# Patient Record
Sex: MALE | Race: WHITE | NOT HISPANIC OR LATINO | Employment: OTHER | URBAN - NONMETROPOLITAN AREA
[De-identification: names, ages, dates, MRNs, and addresses within clinical notes are randomized per-mention and may not be internally consistent; named-entity substitution may affect disease eponyms.]

---

## 2018-01-29 ENCOUNTER — TRANSCRIBE ORDERS (OUTPATIENT)
Dept: ADMINISTRATIVE | Facility: HOSPITAL | Age: 73
End: 2018-01-29

## 2018-01-29 DIAGNOSIS — M54.5 LOW BACK PAIN, UNSPECIFIED BACK PAIN LATERALITY, UNSPECIFIED CHRONICITY, WITH SCIATICA PRESENCE UNSPECIFIED: Primary | ICD-10-CM

## 2018-02-19 ENCOUNTER — HOSPITAL ENCOUNTER (OUTPATIENT)
Dept: MRI IMAGING | Facility: HOSPITAL | Age: 73
Discharge: HOME | End: 2018-02-19
Admitting: NURSE PRACTITIONER

## 2018-02-19 DIAGNOSIS — M54.5 LOW BACK PAIN, UNSPECIFIED BACK PAIN LATERALITY, UNSPECIFIED CHRONICITY, WITH SCIATICA PRESENCE UNSPECIFIED: ICD-10-CM

## 2018-02-19 PROCEDURE — 72148 MRI LUMBAR SPINE W/O DYE: CPT

## 2018-12-24 PROCEDURE — 99283 EMERGENCY DEPT VISIT LOW MDM: CPT

## 2018-12-24 RX ORDER — ESCITALOPRAM OXALATE 10 MG/1
10 TABLET ORAL DAILY
COMMUNITY

## 2018-12-24 RX ORDER — MISOPROSTOL 100 UG/1
100 TABLET ORAL 2 TIMES DAILY
Status: ON HOLD | COMMUNITY
End: 2019-08-08

## 2018-12-24 RX ORDER — HYDROCODONE BITARTRATE AND ACETAMINOPHEN 5; 325 MG/1; MG/1
1 TABLET ORAL DAILY PRN
COMMUNITY
End: 2018-12-25

## 2018-12-24 RX ORDER — GABAPENTIN 300 MG/1
300 CAPSULE ORAL 3 TIMES DAILY
Status: ON HOLD | COMMUNITY
End: 2019-08-08

## 2018-12-24 RX ORDER — CYCLOBENZAPRINE HCL 10 MG
10 TABLET ORAL 3 TIMES DAILY PRN
Status: ON HOLD | COMMUNITY
End: 2019-08-08

## 2018-12-24 RX ORDER — ATENOLOL 25 MG/1
13 TABLET ORAL DAILY
COMMUNITY
End: 2019-08-19 | Stop reason: HOSPADM

## 2018-12-24 RX ORDER — LOSARTAN POTASSIUM 50 MG/1
100 TABLET ORAL DAILY
Status: ON HOLD | COMMUNITY
End: 2019-08-17

## 2018-12-25 ENCOUNTER — HOSPITAL ENCOUNTER (EMERGENCY)
Facility: HOSPITAL | Age: 73
Discharge: HOME | End: 2018-12-25
Attending: EMERGENCY MEDICINE | Admitting: EMERGENCY MEDICINE

## 2018-12-25 VITALS
BODY MASS INDEX: 30.07 KG/M2 | HEIGHT: 71 IN | SYSTOLIC BLOOD PRESSURE: 139 MMHG | RESPIRATION RATE: 16 BRPM | DIASTOLIC BLOOD PRESSURE: 81 MMHG | HEART RATE: 54 BPM | OXYGEN SATURATION: 93 % | WEIGHT: 214.8 LBS | TEMPERATURE: 98.9 F

## 2018-12-25 DIAGNOSIS — M54.42 CHRONIC LEFT-SIDED LOW BACK PAIN WITH LEFT-SIDED SCIATICA: Primary | ICD-10-CM

## 2018-12-25 DIAGNOSIS — G89.29 CHRONIC LEFT-SIDED LOW BACK PAIN WITH LEFT-SIDED SCIATICA: Primary | ICD-10-CM

## 2018-12-25 PROCEDURE — 25010000002 KETOROLAC TROMETHAMINE PER 15 MG: Performed by: EMERGENCY MEDICINE

## 2018-12-25 PROCEDURE — 25010000002 HYDROMORPHONE PER 4 MG: Performed by: EMERGENCY MEDICINE

## 2018-12-25 PROCEDURE — 96372 THER/PROPH/DIAG INJ SC/IM: CPT

## 2018-12-25 RX ORDER — HYDROMORPHONE HYDROCHLORIDE 1 MG/ML
0.5 INJECTION, SOLUTION INTRAMUSCULAR; INTRAVENOUS; SUBCUTANEOUS ONCE
Status: COMPLETED | OUTPATIENT
Start: 2018-12-25 | End: 2018-12-25

## 2018-12-25 RX ORDER — KETOROLAC TROMETHAMINE 30 MG/ML
60 INJECTION, SOLUTION INTRAMUSCULAR; INTRAVENOUS ONCE
Status: COMPLETED | OUTPATIENT
Start: 2018-12-25 | End: 2018-12-25

## 2018-12-25 RX ORDER — HYDROCODONE BITARTRATE AND ACETAMINOPHEN 5; 325 MG/1; MG/1
1 TABLET ORAL EVERY 4 HOURS PRN
Qty: 15 TABLET | Refills: 0 | Status: ON HOLD | OUTPATIENT
Start: 2018-12-25 | End: 2019-08-17

## 2018-12-25 RX ORDER — PREDNISONE 50 MG/1
50 TABLET ORAL DAILY
Qty: 4 TABLET | Refills: 0 | Status: SHIPPED | OUTPATIENT
Start: 2018-12-25 | End: 2019-08-19 | Stop reason: HOSPADM

## 2018-12-25 RX ADMIN — HYDROMORPHONE HYDROCHLORIDE 0.5 MG: 1 INJECTION, SOLUTION INTRAMUSCULAR; INTRAVENOUS; SUBCUTANEOUS at 01:06

## 2018-12-25 RX ADMIN — KETOROLAC TROMETHAMINE 60 MG: 30 INJECTION, SOLUTION INTRAMUSCULAR at 01:07

## 2019-08-02 ENCOUNTER — HOSPITAL ENCOUNTER (OUTPATIENT)
Dept: CARDIOLOGY | Facility: HOSPITAL | Age: 74
Discharge: HOME | End: 2019-08-02
Admitting: INTERNAL MEDICINE

## 2019-08-02 ENCOUNTER — APPOINTMENT (OUTPATIENT)
Dept: CARDIOLOGY | Facility: HOSPITAL | Age: 74
End: 2019-08-02

## 2019-08-02 ENCOUNTER — TRANSCRIBE ORDERS (OUTPATIENT)
Dept: ADMINISTRATIVE | Facility: HOSPITAL | Age: 74
End: 2019-08-02

## 2019-08-02 ENCOUNTER — TRANSCRIBE ORDERS (OUTPATIENT)
Dept: CARDIOLOGY | Facility: CLINIC | Age: 74
End: 2019-08-02

## 2019-08-02 ENCOUNTER — HOSPITAL ENCOUNTER (OUTPATIENT)
Facility: HOSPITAL | Age: 74
Setting detail: HOSPITAL OUTPATIENT SURGERY
End: 2019-08-02
Attending: INTERNAL MEDICINE | Admitting: INTERNAL MEDICINE

## 2019-08-02 VITALS
WEIGHT: 195 LBS | DIASTOLIC BLOOD PRESSURE: 78 MMHG | BODY MASS INDEX: 27.3 KG/M2 | HEART RATE: 50 BPM | HEIGHT: 71 IN | SYSTOLIC BLOOD PRESSURE: 125 MMHG

## 2019-08-02 DIAGNOSIS — R07.9 CHEST PAIN, UNSPECIFIED TYPE: Primary | ICD-10-CM

## 2019-08-02 DIAGNOSIS — R07.9 CHEST PAIN, UNSPECIFIED TYPE: ICD-10-CM

## 2019-08-02 LAB
BH CV STRESS BP STAGE 1: NORMAL
BH CV STRESS BP STAGE 2: NORMAL
BH CV STRESS BP STAGE 3: NORMAL
BH CV STRESS DOB - ATROPINE STAGE 3: 0.6
BH CV STRESS DOSE DOBUTAMINE STAGE 1: 10
BH CV STRESS DOSE DOBUTAMINE STAGE 2: 20
BH CV STRESS DOSE DOBUTAMINE STAGE 3: 30
BH CV STRESS DURATION MIN STAGE 1: 3
BH CV STRESS DURATION MIN STAGE 2: 3
BH CV STRESS DURATION MIN STAGE 3: 3
BH CV STRESS DURATION SEC STAGE 1: 0
BH CV STRESS DURATION SEC STAGE 2: 0
BH CV STRESS DURATION SEC STAGE 3: 0
BH CV STRESS ECHO POST STRESS EJECTION FRACTION EF: 60 %
BH CV STRESS HR STAGE 1: 56
BH CV STRESS HR STAGE 2: 71
BH CV STRESS HR STAGE 3: 152
BH CV STRESS PROTOCOL 1: NORMAL
BH CV STRESS RECOVERY BP: NORMAL MMHG
BH CV STRESS RECOVERY HR: 75 BPM
BH CV STRESS STAGE 1: 1
BH CV STRESS STAGE 2: 2
BH CV STRESS STAGE 3: 3
LV EF 2D ECHO EST: 55 %
MAXIMAL PREDICTED HEART RATE: 148 BPM
PERCENT MAX PREDICTED HR: 102.7 %
STRESS BASELINE BP: NORMAL MMHG
STRESS BASELINE HR: 49 BPM
STRESS PERCENT HR: 121 %
STRESS POST EXERCISE DUR MIN: 9 MIN
STRESS POST EXERCISE DUR SEC: 0 SEC
STRESS POST PEAK BP: NORMAL MMHG
STRESS POST PEAK HR: 152 BPM
STRESS TARGET HR: 126 BPM

## 2019-08-02 PROCEDURE — 93350 STRESS TTE ONLY: CPT

## 2019-08-02 PROCEDURE — 63710000001 NITROGLYCERIN 0.4 MG SUBLINGUAL TABLET 25 EACH BOTTLE: Performed by: INTERNAL MEDICINE

## 2019-08-02 PROCEDURE — 25010000003 DOBUTAMINE PER 250 MG: Performed by: INTERNAL MEDICINE

## 2019-08-02 PROCEDURE — 25010000002 PERFLUTREN 6.52 MG/ML SUSPENSION: Performed by: INTERNAL MEDICINE

## 2019-08-02 PROCEDURE — 93018 CV STRESS TEST I&R ONLY: CPT | Performed by: INTERNAL MEDICINE

## 2019-08-02 PROCEDURE — 93017 CV STRESS TEST TRACING ONLY: CPT

## 2019-08-02 PROCEDURE — 93350 STRESS TTE ONLY: CPT | Performed by: INTERNAL MEDICINE

## 2019-08-02 PROCEDURE — A9270 NON-COVERED ITEM OR SERVICE: HCPCS | Performed by: INTERNAL MEDICINE

## 2019-08-02 PROCEDURE — 93352 ADMIN ECG CONTRAST AGENT: CPT | Performed by: INTERNAL MEDICINE

## 2019-08-02 RX ORDER — METOPROLOL TARTRATE 5 MG/5ML
5 INJECTION INTRAVENOUS ONCE
Status: COMPLETED | OUTPATIENT
Start: 2019-08-02 | End: 2019-08-02

## 2019-08-02 RX ORDER — SODIUM CHLORIDE 9 MG/ML
1-3 INJECTION, SOLUTION INTRAVENOUS CONTINUOUS
Status: CANCELLED | OUTPATIENT
Start: 2019-08-02

## 2019-08-02 RX ORDER — NITROGLYCERIN 0.4 MG/1
0.4 TABLET SUBLINGUAL ONCE
Status: COMPLETED | OUTPATIENT
Start: 2019-08-02 | End: 2019-08-02

## 2019-08-02 RX ORDER — TIZANIDINE 4 MG/1
4 TABLET ORAL 3 TIMES DAILY
Status: ON HOLD | COMMUNITY
End: 2019-08-08

## 2019-08-02 RX ORDER — DOBUTAMINE HYDROCHLORIDE 100 MG/100ML
10-50 INJECTION INTRAVENOUS CONTINUOUS
Status: DISCONTINUED | OUTPATIENT
Start: 2019-08-02 | End: 2019-08-03 | Stop reason: HOSPADM

## 2019-08-02 RX ADMIN — PERFLUTREN 8.48 MG: 6.52 INJECTION, SUSPENSION INTRAVENOUS at 13:02

## 2019-08-02 RX ADMIN — ATROPINE SULFATE 0.6 MG: 0.1 INJECTION PARENTERAL at 13:50

## 2019-08-02 RX ADMIN — NITROGLYCERIN 0.4 MG: 0.4 TABLET SUBLINGUAL at 13:51

## 2019-08-02 RX ADMIN — METOPROLOL TARTRATE 5 MG: 5 INJECTION INTRAVENOUS at 13:51

## 2019-08-02 RX ADMIN — Medication 10 MCG/KG/MIN: at 13:02

## 2019-08-08 ENCOUNTER — APPOINTMENT (OUTPATIENT)
Dept: GENERAL RADIOLOGY | Facility: HOSPITAL | Age: 74
End: 2019-08-08

## 2019-08-08 ENCOUNTER — HOSPITAL ENCOUNTER (INPATIENT)
Facility: HOSPITAL | Age: 74
LOS: 9 days | Discharge: HOME | End: 2019-08-19
Attending: INTERNAL MEDICINE | Admitting: THORACIC SURGERY (CARDIOTHORACIC VASCULAR SURGERY)

## 2019-08-08 DIAGNOSIS — R07.9 CHEST PAIN, UNSPECIFIED TYPE: Primary | ICD-10-CM

## 2019-08-08 DIAGNOSIS — I25.10 LEFT MAIN CORONARY ARTERY DISEASE: ICD-10-CM

## 2019-08-08 DIAGNOSIS — Z74.09 IMPAIRED MOBILITY: ICD-10-CM

## 2019-08-08 LAB
ALBUMIN SERPL-MCNC: 4 G/DL (ref 3.5–5)
ALBUMIN/GLOB SERPL: 1.4 G/DL (ref 1.1–2.5)
ALP SERPL-CCNC: 89 U/L (ref 24–120)
ALT SERPL W P-5'-P-CCNC: 37 U/L (ref 0–54)
ANION GAP SERPL CALCULATED.3IONS-SCNC: 6 MMOL/L (ref 4–13)
AST SERPL-CCNC: 27 U/L (ref 7–45)
BASOPHILS # BLD AUTO: 0.03 10*3/MM3 (ref 0–0.2)
BASOPHILS NFR BLD AUTO: 0.5 % (ref 0–1.5)
BILIRUB SERPL-MCNC: 0.4 MG/DL (ref 0.1–1)
BUN BLD-MCNC: 25 MG/DL (ref 5–21)
BUN/CREAT SERPL: 28.1 (ref 7–25)
CALCIUM SPEC-SCNC: 8.9 MG/DL (ref 8.4–10.4)
CHLORIDE SERPL-SCNC: 107 MMOL/L (ref 98–110)
CO2 SERPL-SCNC: 27 MMOL/L (ref 24–31)
CREAT BLD-MCNC: 0.89 MG/DL (ref 0.5–1.4)
DEPRECATED RDW RBC AUTO: 43 FL (ref 37–54)
EOSINOPHIL # BLD AUTO: 0.08 10*3/MM3 (ref 0–0.4)
EOSINOPHIL NFR BLD AUTO: 1.4 % (ref 0.3–6.2)
ERYTHROCYTE [DISTWIDTH] IN BLOOD BY AUTOMATED COUNT: 12.9 % (ref 12.3–15.4)
GFR SERPL CREATININE-BSD FRML MDRD: 84 ML/MIN/1.73
GLOBULIN UR ELPH-MCNC: 2.9 GM/DL
GLUCOSE BLD-MCNC: 94 MG/DL (ref 70–100)
HCT VFR BLD AUTO: 38.1 % (ref 37.5–51)
HGB BLD-MCNC: 13.7 G/DL (ref 13–17.7)
HOLD SPECIMEN: NORMAL
HOLD SPECIMEN: NORMAL
IMM GRANULOCYTES # BLD AUTO: 0.01 10*3/MM3 (ref 0–0.05)
IMM GRANULOCYTES NFR BLD AUTO: 0.2 % (ref 0–0.5)
INR PPP: 0.98 (ref 0.91–1.09)
LYMPHOCYTES # BLD AUTO: 1.76 10*3/MM3 (ref 0.7–3.1)
LYMPHOCYTES NFR BLD AUTO: 30.7 % (ref 19.6–45.3)
MCH RBC QN AUTO: 33 PG (ref 26.6–33)
MCHC RBC AUTO-ENTMCNC: 36 G/DL (ref 31.5–35.7)
MCV RBC AUTO: 91.8 FL (ref 79–97)
MONOCYTES # BLD AUTO: 0.66 10*3/MM3 (ref 0.1–0.9)
MONOCYTES NFR BLD AUTO: 11.5 % (ref 5–12)
NEUTROPHILS # BLD AUTO: 3.19 10*3/MM3 (ref 1.7–7)
NEUTROPHILS NFR BLD AUTO: 55.7 % (ref 42.7–76)
NRBC BLD AUTO-RTO: 0 /100 WBC (ref 0–0.2)
PLATELET # BLD AUTO: 244 10*3/MM3 (ref 140–450)
PMV BLD AUTO: 10 FL (ref 6–12)
POTASSIUM BLD-SCNC: 4.3 MMOL/L (ref 3.5–5.3)
PROT SERPL-MCNC: 6.9 G/DL (ref 6.3–8.7)
PROTHROMBIN TIME: 13.3 SECONDS (ref 11.9–14.6)
RBC # BLD AUTO: 4.15 10*6/MM3 (ref 4.14–5.8)
SODIUM BLD-SCNC: 140 MMOL/L (ref 135–145)
TROPONIN I SERPL-MCNC: <0.012 NG/ML (ref 0–0.03)
WBC NRBC COR # BLD: 5.73 10*3/MM3 (ref 3.4–10.8)
WHOLE BLOOD HOLD SPECIMEN: NORMAL
WHOLE BLOOD HOLD SPECIMEN: NORMAL

## 2019-08-08 PROCEDURE — 93005 ELECTROCARDIOGRAM TRACING: CPT | Performed by: EMERGENCY MEDICINE

## 2019-08-08 PROCEDURE — 84484 ASSAY OF TROPONIN QUANT: CPT | Performed by: NURSE PRACTITIONER

## 2019-08-08 PROCEDURE — G0378 HOSPITAL OBSERVATION PER HR: HCPCS

## 2019-08-08 PROCEDURE — 99283 EMERGENCY DEPT VISIT LOW MDM: CPT

## 2019-08-08 PROCEDURE — 93005 ELECTROCARDIOGRAM TRACING: CPT | Performed by: NURSE PRACTITIONER

## 2019-08-08 PROCEDURE — 71045 X-RAY EXAM CHEST 1 VIEW: CPT

## 2019-08-08 PROCEDURE — 93010 ELECTROCARDIOGRAM REPORT: CPT | Performed by: INTERNAL MEDICINE

## 2019-08-08 PROCEDURE — 85025 COMPLETE CBC W/AUTO DIFF WBC: CPT | Performed by: NURSE PRACTITIONER

## 2019-08-08 PROCEDURE — 80053 COMPREHEN METABOLIC PANEL: CPT | Performed by: NURSE PRACTITIONER

## 2019-08-08 PROCEDURE — 85610 PROTHROMBIN TIME: CPT | Performed by: NURSE PRACTITIONER

## 2019-08-08 RX ORDER — LOSARTAN POTASSIUM 50 MG/1
100 TABLET ORAL DAILY
Status: DISCONTINUED | OUTPATIENT
Start: 2019-08-09 | End: 2019-08-12 | Stop reason: HOSPADM

## 2019-08-08 RX ORDER — SODIUM CHLORIDE 0.9 % (FLUSH) 0.9 %
10 SYRINGE (ML) INJECTION AS NEEDED
Status: DISCONTINUED | OUTPATIENT
Start: 2019-08-08 | End: 2019-08-12 | Stop reason: HOSPADM

## 2019-08-08 RX ORDER — SODIUM CHLORIDE 0.9 % (FLUSH) 0.9 %
3 SYRINGE (ML) INJECTION EVERY 12 HOURS SCHEDULED
Status: DISCONTINUED | OUTPATIENT
Start: 2019-08-08 | End: 2019-08-12 | Stop reason: HOSPADM

## 2019-08-08 RX ORDER — TIZANIDINE 4 MG/1
4 TABLET ORAL 3 TIMES DAILY
Status: DISCONTINUED | OUTPATIENT
Start: 2019-08-08 | End: 2019-08-08

## 2019-08-08 RX ORDER — MISOPROSTOL 100 MCG
100 TABLET ORAL 2 TIMES DAILY
Status: DISCONTINUED | OUTPATIENT
Start: 2019-08-09 | End: 2019-08-08

## 2019-08-08 RX ORDER — CYCLOBENZAPRINE HCL 10 MG
10 TABLET ORAL 3 TIMES DAILY PRN
Status: DISCONTINUED | OUTPATIENT
Start: 2019-08-08 | End: 2019-08-08

## 2019-08-08 RX ORDER — NITROGLYCERIN 20 MG/100ML
10-50 INJECTION INTRAVENOUS
Status: DISCONTINUED | OUTPATIENT
Start: 2019-08-08 | End: 2019-08-12 | Stop reason: HOSPADM

## 2019-08-08 RX ORDER — ATENOLOL 25 MG/1
13 TABLET ORAL DAILY
Status: DISCONTINUED | OUTPATIENT
Start: 2019-08-08 | End: 2019-08-08

## 2019-08-08 RX ORDER — ATENOLOL 25 MG/1
25 TABLET ORAL DAILY
Status: DISCONTINUED | OUTPATIENT
Start: 2019-08-09 | End: 2019-08-10

## 2019-08-08 RX ORDER — HYDROCODONE BITARTRATE AND ACETAMINOPHEN 5; 325 MG/1; MG/1
1 TABLET ORAL EVERY 4 HOURS PRN
Status: DISCONTINUED | OUTPATIENT
Start: 2019-08-08 | End: 2019-08-12 | Stop reason: HOSPADM

## 2019-08-08 RX ORDER — SODIUM CHLORIDE 0.9 % (FLUSH) 0.9 %
3-10 SYRINGE (ML) INJECTION AS NEEDED
Status: DISCONTINUED | OUTPATIENT
Start: 2019-08-08 | End: 2019-08-12 | Stop reason: HOSPADM

## 2019-08-08 RX ORDER — PREDNISONE 50 MG/1
50 TABLET ORAL DAILY
Status: DISCONTINUED | OUTPATIENT
Start: 2019-08-09 | End: 2019-08-09

## 2019-08-08 RX ORDER — GABAPENTIN 100 MG/1
300 CAPSULE ORAL 3 TIMES DAILY
Status: DISCONTINUED | OUTPATIENT
Start: 2019-08-08 | End: 2019-08-08

## 2019-08-08 RX ORDER — ESCITALOPRAM OXALATE 10 MG/1
10 TABLET ORAL DAILY
Status: DISCONTINUED | OUTPATIENT
Start: 2019-08-09 | End: 2019-08-12 | Stop reason: HOSPADM

## 2019-08-08 RX ORDER — NITROGLYCERIN 0.4 MG/1
0.4 TABLET SUBLINGUAL ONCE
Status: COMPLETED | OUTPATIENT
Start: 2019-08-08 | End: 2019-08-08

## 2019-08-08 RX ORDER — AMLODIPINE BESYLATE AND BENAZEPRIL HYDROCHLORIDE 5; 10 MG/1; MG/1
1 CAPSULE ORAL NIGHTLY
COMMUNITY
End: 2019-08-19 | Stop reason: HOSPADM

## 2019-08-08 RX ORDER — MORPHINE SULFATE 2 MG/ML
2 INJECTION, SOLUTION INTRAMUSCULAR; INTRAVENOUS EVERY 4 HOURS PRN
Status: DISCONTINUED | OUTPATIENT
Start: 2019-08-08 | End: 2019-08-10 | Stop reason: ALTCHOICE

## 2019-08-08 RX ADMIN — NITROGLYCERIN 10 MCG/MIN: 20 INJECTION INTRAVENOUS at 19:18

## 2019-08-08 RX ADMIN — NITROGLYCERIN 0.4 MG: 0.4 TABLET SUBLINGUAL at 17:29

## 2019-08-08 NOTE — ED PROVIDER NOTES
Subjective   Patient is a 74-year-old white male presents emergency department with chest pain that started around 330 this afternoon.  Patient states that he had chest unloaded his car from traveling to [place] today and states that he started having chest pain.  He denies any shortness of breath.  Patient rates his pain as a 6 on a scale of 1-10.  Patient states that he did have a abnormal stress test and is scheduled to have a cardiac cath tomorrow per Dr. Tovar.  Patient denies nausea or vomiting.        History provided by:  Patient   used: No        Review of Systems   Constitutional: Negative.    HENT: Negative.    Eyes: Negative.    Respiratory: Negative.    Cardiovascular:        Patient is a 74-year-old white male presents emergency department with chest pain that started around 330 this afternoon.  Patient states that he had chest unloaded his car from traveling to [place] today and states that he started having chest pain.  He denies any shortness of breath.  Patient rates his pain as a 6 on a scale of 1-10.  Patient states that he did have a abnormal stress test and is scheduled to have a cardiac cath tomorrow per Dr. Tovar.  Patient denies nausea or vomiting.     Gastrointestinal: Negative.    Endocrine: Negative.    Genitourinary: Negative.    Musculoskeletal: Negative.    Skin: Negative.    Allergic/Immunologic: Negative.    Neurological: Negative.    Hematological: Negative.    Psychiatric/Behavioral: Negative.    All other systems reviewed and are negative.      Past Medical History:   Diagnosis Date   • Hypertension        Allergies   Allergen Reactions   • Aleve [Naproxen] Other (See Comments)     HTN & HEAVY BREATHING       Past Surgical History:   Procedure Laterality Date   • HERNIA REPAIR     • VASECTOMY         Family History   Problem Relation Age of Onset   • Heart disease Father    • Heart disease Maternal Grandmother    • Heart disease Paternal Grandmother   "      Social History     Socioeconomic History   • Marital status:      Spouse name: Not on file   • Number of children: Not on file   • Years of education: Not on file   • Highest education level: Not on file   Tobacco Use   • Smoking status: Former Smoker   • Smokeless tobacco: Never Used   Substance and Sexual Activity   • Alcohol use: Yes   • Drug use: No   • Sexual activity: Defer       Prior to Admission medications    Medication Sig Start Date End Date Taking? Authorizing Provider   atenolol (TENORMIN) 25 MG tablet Take 13 mg by mouth Daily.    Rosana Jo MD   cyclobenzaprine (FLEXERIL) 10 MG tablet Take 10 mg by mouth 3 (Three) Times a Day As Needed for Muscle Spasms.    Rosana Jo MD   diclofenac sodium (VOTAREN XR) 100 MG 24 hr tablet Take 75 mg by mouth 2 (Two) Times a Day.    Rosana Jo MD   escitalopram (LEXAPRO) 10 MG tablet Take 10 mg by mouth Daily.    Rosana Jo MD   gabapentin (NEURONTIN) 300 MG capsule Take 300 mg by mouth 3 (Three) Times a Day.    Rosana Jo MD   HYDROcodone-acetaminophen (NORCO) 5-325 MG per tablet Take 1 tablet by mouth Every 4 (Four) Hours As Needed for Severe Pain . 12/25/18   Juan C Zuniga DO   losartan (COZAAR) 50 MG tablet Take 100 mg by mouth Daily.    Rosana Jo MD   misoprostol (CYTOTEC) 100 MCG tablet Take 100 mcg by mouth 2 (Two) Times a Day.    Rosana Jo MD   Multiple Vitamins-Minerals (ICAPS AREDS 2 PO) Take  by mouth 2 (Two) Times a Day.    Rosana Jo MD   predniSONE (DELTASONE) 50 MG tablet Take 1 tablet by mouth Daily. 12/25/18   Juan C Zuniga DO   tiZANidine (ZANAFLEX) 4 MG tablet Take 4 mg by mouth 3 (Three) Times a Day.    Rosana Jo MD       /80 (BP Location: Left arm, Patient Position: Sitting)   Pulse 57   Temp 97.5 °F (36.4 °C) (Oral)   Resp 18   Ht 180.3 cm (71\")   Wt 88.6 kg (195 lb 4.8 oz)   SpO2 98%   BMI 27.24 kg/m² "     Objective   Physical Exam   Constitutional: He is oriented to person, place, and time. He appears well-developed and well-nourished.   HENT:   Head: Normocephalic and atraumatic.   Eyes: Conjunctivae and EOM are normal. Pupils are equal, round, and reactive to light.   Neck: Normal range of motion. Neck supple.   Cardiovascular: Normal rate, regular rhythm, normal heart sounds and intact distal pulses.   Pulmonary/Chest: Effort normal and breath sounds normal.   Abdominal: Soft. Bowel sounds are normal.   Musculoskeletal: Normal range of motion.   Neurological: He is alert and oriented to person, place, and time. He has normal reflexes.   Skin: Skin is warm and dry.   Psychiatric: He has a normal mood and affect. His behavior is normal. Judgment and thought content normal.   Nursing note and vitals reviewed.      Procedures         Lab Results (last 24 hours)     Procedure Component Value Units Date/Time    CBC & Differential [[id number]] Collected:  08/08/19 1749    Specimen:  Blood Updated:  08/08/19 1758    Narrative:       The following orders were created for panel order CBC & Differential.  Procedure                               Abnormality         Status                     ---------                               -----------         ------                     CBC Auto Differential[[id number]]        Abnormal            Final result                 Please view results for these tests on the individual orders.    Comprehensive Metabolic Panel [[id number]]  (Abnormal) Collected:  08/08/19 1749    Specimen:  Blood Updated:  08/08/19 1808     Glucose 94 mg/dL      BUN 25 mg/dL      Creatinine 0.89 mg/dL      Sodium 140 mmol/L      Potassium 4.3 mmol/L      Chloride 107 mmol/L      CO2 27.0 mmol/L      Calcium 8.9 mg/dL      Total Protein 6.9 g/dL      Albumin 4.00 g/dL      ALT (SGPT) 37 U/L      AST (SGOT) 27 U/L      Alkaline Phosphatase 89 U/L      Total Bilirubin 0.4 mg/dL      eGFR Non African Amer 84  mL/min/1.73      Globulin 2.9 gm/dL      A/G Ratio 1.4 g/dL      BUN/Creatinine Ratio 28.1     Anion Gap 6.0 mmol/L     Narrative:       GFR Normal >60  Chronic Kidney Disease <60  Kidney Failure <15    Protime-INR [[id number]]  (Normal) Collected:  08/08/19 1749    Specimen:  Blood Updated:  08/08/19 1806     Protime 13.3 Seconds      INR 0.98    Troponin [[id number]]  (Normal) Collected:  08/08/19 1749    Specimen:  Blood Updated:  08/08/19 1818     Troponin I <0.012 ng/mL     CBC Auto Differential [[id number]]  (Abnormal) Collected:  08/08/19 1749    Specimen:  Blood Updated:  08/08/19 1758     WBC 5.73 10*3/mm3      RBC 4.15 10*6/mm3      Hemoglobin 13.7 g/dL      Hematocrit 38.1 %      MCV 91.8 fL      MCH 33.0 pg      MCHC 36.0 g/dL      RDW 12.9 %      RDW-SD 43.0 fl      MPV 10.0 fL      Platelets 244 10*3/mm3      Neutrophil % 55.7 %      Lymphocyte % 30.7 %      Monocyte % 11.5 %      Eosinophil % 1.4 %      Basophil % 0.5 %      Immature Grans % 0.2 %      Neutrophils, Absolute 3.19 10*3/mm3      Lymphocytes, Absolute 1.76 10*3/mm3      Monocytes, Absolute 0.66 10*3/mm3      Eosinophils, Absolute 0.08 10*3/mm3      Basophils, Absolute 0.03 10*3/mm3      Immature Grans, Absolute 0.01 10*3/mm3      nRBC 0.0 /100 WBC           XR Chest 1 View   Final Result   . No acute disease.   This report was finalized on 08/08/2019 17:47 by Dr. Bebeto Floyd MD.          ED Course  ED Course as of Aug 08 2101   Thu Aug 08, 2019   1843 Pt states that his pain is resolved at this time . He states that he does not want another ntg at this time. Call placed to dr dorantes at this time for further   [CW]   1851 Spoke with dr dorantes - has accepted for admission. Advised pt and pt family. They are in agreement with care plan  [CW]      ED Course User Index  [CW] Krissy Braun, APRN          MDM  Number of Diagnoses or Management Options  Chest pain, unspecified type: minor     Amount and/or Complexity of Data  Reviewed  Clinical lab tests: ordered and reviewed  Tests in the radiology section of CPT®: ordered and reviewed    Patient Progress  Patient progress: stable      Final diagnoses:   Chest pain, unspecified type          Krissy Braun, APRN  08/08/19 0641

## 2019-08-09 PROBLEM — I25.10 LEFT MAIN CORONARY ARTERY DISEASE: Status: ACTIVE | Noted: 2019-08-09

## 2019-08-09 LAB
ARTERIAL PATENCY WRIST A: POSITIVE
ATMOSPHERIC PRESS: 748 MMHG
BASE EXCESS BLDA CALC-SCNC: 0 MMOL/L (ref 0–2)
BDY SITE: NORMAL
BODY TEMPERATURE: 37 C
HCO3 BLDA-SCNC: 24.7 MMOL/L (ref 20–26)
Lab: NORMAL
MODALITY: NORMAL
PCO2 BLDA: 39.9 MM HG (ref 35–45)
PH BLDA: 7.4 PH UNITS (ref 7.35–7.45)
PO2 BLDA: 87.1 MM HG (ref 83–108)
SAO2 % BLDCOA: 97.6 % (ref 94–99)
VENTILATOR MODE: NORMAL

## 2019-08-09 PROCEDURE — B2111ZZ FLUOROSCOPY OF MULTIPLE CORONARY ARTERIES USING LOW OSMOLAR CONTRAST: ICD-10-PCS | Performed by: INTERNAL MEDICINE

## 2019-08-09 PROCEDURE — 94760 N-INVAS EAR/PLS OXIMETRY 1: CPT

## 2019-08-09 PROCEDURE — 99223 1ST HOSP IP/OBS HIGH 75: CPT | Performed by: INTERNAL MEDICINE

## 2019-08-09 PROCEDURE — C1894 INTRO/SHEATH, NON-LASER: HCPCS | Performed by: INTERNAL MEDICINE

## 2019-08-09 PROCEDURE — 94799 UNLISTED PULMONARY SVC/PX: CPT

## 2019-08-09 PROCEDURE — 25010000002 ENOXAPARIN PER 10 MG: Performed by: INTERNAL MEDICINE

## 2019-08-09 PROCEDURE — G0378 HOSPITAL OBSERVATION PER HR: HCPCS

## 2019-08-09 PROCEDURE — 99222 1ST HOSP IP/OBS MODERATE 55: CPT | Performed by: THORACIC SURGERY (CARDIOTHORACIC VASCULAR SURGERY)

## 2019-08-09 PROCEDURE — 25010000002 MIDAZOLAM PER 1 MG: Performed by: INTERNAL MEDICINE

## 2019-08-09 PROCEDURE — C1760 CLOSURE DEV, VASC: HCPCS | Performed by: INTERNAL MEDICINE

## 2019-08-09 PROCEDURE — 99152 MOD SED SAME PHYS/QHP 5/>YRS: CPT | Performed by: INTERNAL MEDICINE

## 2019-08-09 PROCEDURE — 25010000002 DIPHENHYDRAMINE PER 50 MG: Performed by: INTERNAL MEDICINE

## 2019-08-09 PROCEDURE — 93458 L HRT ARTERY/VENTRICLE ANGIO: CPT | Performed by: INTERNAL MEDICINE

## 2019-08-09 PROCEDURE — B41F1ZZ FLUOROSCOPY OF RIGHT LOWER EXTREMITY ARTERIES USING LOW OSMOLAR CONTRAST: ICD-10-PCS | Performed by: INTERNAL MEDICINE

## 2019-08-09 PROCEDURE — 99153 MOD SED SAME PHYS/QHP EA: CPT | Performed by: INTERNAL MEDICINE

## 2019-08-09 PROCEDURE — 4A023N7 MEASUREMENT OF CARDIAC SAMPLING AND PRESSURE, LEFT HEART, PERCUTANEOUS APPROACH: ICD-10-PCS | Performed by: INTERNAL MEDICINE

## 2019-08-09 PROCEDURE — 25010000002 IOPAMIDOL 61 % SOLUTION: Performed by: INTERNAL MEDICINE

## 2019-08-09 PROCEDURE — 36600 WITHDRAWAL OF ARTERIAL BLOOD: CPT

## 2019-08-09 PROCEDURE — C1769 GUIDE WIRE: HCPCS | Performed by: INTERNAL MEDICINE

## 2019-08-09 PROCEDURE — 82803 BLOOD GASES ANY COMBINATION: CPT

## 2019-08-09 PROCEDURE — 63710000001 PREDNISONE PER 1 MG: Performed by: INTERNAL MEDICINE

## 2019-08-09 PROCEDURE — 25010000002 FENTANYL CITRATE (PF) 100 MCG/2ML SOLUTION: Performed by: INTERNAL MEDICINE

## 2019-08-09 PROCEDURE — 25010000002 HEPARIN (PORCINE): Performed by: INTERNAL MEDICINE

## 2019-08-09 RX ORDER — MIDAZOLAM HYDROCHLORIDE 1 MG/ML
INJECTION INTRAMUSCULAR; INTRAVENOUS AS NEEDED
Status: DISCONTINUED | OUTPATIENT
Start: 2019-08-09 | End: 2019-08-09 | Stop reason: HOSPADM

## 2019-08-09 RX ORDER — LIDOCAINE HYDROCHLORIDE 20 MG/ML
INJECTION, SOLUTION INFILTRATION; PERINEURAL AS NEEDED
Status: DISCONTINUED | OUTPATIENT
Start: 2019-08-09 | End: 2019-08-09 | Stop reason: HOSPADM

## 2019-08-09 RX ORDER — SODIUM CHLORIDE 9 MG/ML
100 INJECTION, SOLUTION INTRAVENOUS CONTINUOUS
Status: DISCONTINUED | OUTPATIENT
Start: 2019-08-09 | End: 2019-08-09

## 2019-08-09 RX ORDER — SODIUM CHLORIDE 9 MG/ML
100 INJECTION, SOLUTION INTRAVENOUS CONTINUOUS
Status: DISCONTINUED | OUTPATIENT
Start: 2019-08-09 | End: 2019-08-10

## 2019-08-09 RX ORDER — ASPIRIN 325 MG
325 TABLET ORAL ONCE
Status: COMPLETED | OUTPATIENT
Start: 2019-08-09 | End: 2019-08-09

## 2019-08-09 RX ORDER — FENTANYL CITRATE 50 UG/ML
INJECTION, SOLUTION INTRAMUSCULAR; INTRAVENOUS AS NEEDED
Status: DISCONTINUED | OUTPATIENT
Start: 2019-08-09 | End: 2019-08-09 | Stop reason: HOSPADM

## 2019-08-09 RX ORDER — DIPHENHYDRAMINE HYDROCHLORIDE 50 MG/ML
INJECTION INTRAMUSCULAR; INTRAVENOUS AS NEEDED
Status: DISCONTINUED | OUTPATIENT
Start: 2019-08-09 | End: 2019-08-09 | Stop reason: HOSPADM

## 2019-08-09 RX ORDER — ASPIRIN 81 MG/1
81 TABLET, CHEWABLE ORAL DAILY
Status: DISCONTINUED | OUTPATIENT
Start: 2019-08-10 | End: 2019-08-12 | Stop reason: HOSPADM

## 2019-08-09 RX ADMIN — ATENOLOL 25 MG: 25 TABLET ORAL at 08:23

## 2019-08-09 RX ADMIN — LOSARTAN POTASSIUM 100 MG: 50 TABLET, FILM COATED ORAL at 08:23

## 2019-08-09 RX ADMIN — SODIUM CHLORIDE 100 ML/HR: 9 INJECTION, SOLUTION INTRAVENOUS at 08:14

## 2019-08-09 RX ADMIN — SODIUM CHLORIDE 100 ML/HR: 9 INJECTION, SOLUTION INTRAVENOUS at 15:59

## 2019-08-09 RX ADMIN — ASPIRIN 325 MG: 325 TABLET, COATED ORAL at 15:59

## 2019-08-09 RX ADMIN — PREDNISONE 50 MG: 20 TABLET ORAL at 08:23

## 2019-08-09 RX ADMIN — SODIUM CHLORIDE, PRESERVATIVE FREE 3 ML: 5 INJECTION INTRAVENOUS at 08:14

## 2019-08-09 RX ADMIN — ENOXAPARIN SODIUM 90 MG: 100 INJECTION SUBCUTANEOUS at 17:29

## 2019-08-09 RX ADMIN — ESCITALOPRAM 10 MG: 10 TABLET, FILM COATED ORAL at 08:23

## 2019-08-09 NOTE — H&P
LOS: 0 days   Patient Care Team:  Marialuisa Colunga MD as PCP - General (Family Medicine)    Chief Complaint:   Chest pain    Exceedingly pleasant 74-year-old  male presented to the emergency room yesterday due to persistent substernal chest pain  This is with exertion.  Improves with rest  Chest pain is non-positional nonpleuritic  Chest pain is non-gustatory in nature  No orthopnea paroxysmal nocturnal dyspnea  Recent stress echo was abnormal and worrisome for ischemia.  Compliant with prescribed medication regimen  No bleeding issues.    Patient Complaints:   Chief Complaint   Patient presents with   • Chest Pain       Telemetry: no malignant arrhythmia. No significant pauses.    Review of Systems     Constitutional: No chills   Has fatigue   No fever.     HENT: Negative.    Eyes: Negative.      Respiratory: Negative for cough,   No chest wall soreness,   Shortness of breath,   no wheezing, no stridor.      Cardiovascular: Precordial chest pain  No palpitations   No significant  leg swelling.     Gastrointestinal: Negative for abdominal distention,  No abdominal pain,   No blood in stool,   No constipation,   No diarrhea,   No nausea   No vomiting.     Endocrine: Negative.    Genitourinary: Negative for difficulty urinating, dysuria, flank pain and hematuria.     Musculoskeletal: Negative.    Skin: Negative for rash and wound.   Allergic/Immunologic: Negative.      Neurological: Negative for dizziness, syncope, weakness,   No light-headedness  No  headaches.     Hematological: Does not bruise/bleed easily.     Psychiatric/Behavioral: Negative for agitation or behavioral problems,   No confusion,   the patient is  nervous/anxious.       History:   Past Medical History:   Diagnosis Date   • Hypertension      Past Surgical History:   Procedure Laterality Date   • HERNIA REPAIR     • VASECTOMY       Social History     Socioeconomic History   • Marital status:      Spouse name: Not on file   •  Number of children: Not on file   • Years of education: Not on file   • Highest education level: Not on file   Tobacco Use   • Smoking status: Former Smoker   • Smokeless tobacco: Never Used   Substance and Sexual Activity   • Alcohol use: Yes   • Drug use: No   • Sexual activity: Defer     Family History   Problem Relation Age of Onset   • Heart disease Father    • Heart disease Maternal Grandmother    • Heart disease Paternal Grandmother        Labs:  WBC WBC   Date Value Ref Range Status   08/08/2019 5.73 3.40 - 10.80 10*3/mm3 Final      HGB Hemoglobin   Date Value Ref Range Status   08/08/2019 13.7 13.0 - 17.7 g/dL Final      HCT Hematocrit   Date Value Ref Range Status   08/08/2019 38.1 37.5 - 51.0 % Final      Platelets Platelets   Date Value Ref Range Status   08/08/2019 244 140 - 450 10*3/mm3 Final      MCV MCV   Date Value Ref Range Status   08/08/2019 91.8 79.0 - 97.0 fL Final        Results from last 7 days   Lab Units 08/08/19  1749   SODIUM mmol/L 140   POTASSIUM mmol/L 4.3   CHLORIDE mmol/L 107   CO2 mmol/L 27.0   BUN mg/dL 25*   CREATININE mg/dL 0.89   CALCIUM mg/dL 8.9   BILIRUBIN mg/dL 0.4   ALK PHOS U/L 89   ALT (SGPT) U/L 37   AST (SGOT) U/L 27   GLUCOSE mg/dL 94     Lab Results   Component Value Date    TROPONINI <0.012 08/08/2019     PT/INR:    Protime   Date Value Ref Range Status   08/08/2019 13.3 11.9 - 14.6 Seconds Final   /  INR   Date Value Ref Range Status   08/08/2019 0.98 0.91 - 1.09 Final       Imaging Results (last 72 hours)     Procedure Component Value Units Date/Time    XR Chest 1 View [[id number]] Collected:  08/08/19 1747     Updated:  08/08/19 5939    Narrative:       EXAMINATION: Chest 01/20/2019     HISTORY: Chest pain     FINDINGS: Upright frontal projection of the chest demonstrates no  evidence of acute cardiopulmonary disease. There is no effusion or free  air present. There is spondylosis within the mid and lower thoracic  spine.       Impression:       . No acute  disease.  This report was finalized on 08/08/2019 17:47 by Dr. Bebeto Floyd MD.          Objective     Allergies   Allergen Reactions   • Aleve [Naproxen] Other (See Comments)     HTN & HEAVY BREATHING       Medication Review: Performed  Current Facility-Administered Medications   Medication Dose Route Frequency Provider Last Rate Last Dose   • atenolol (TENORMIN) tablet 25 mg  25 mg Oral Daily Rivera Tovar MD   25 mg at 08/09/19 0823   • [MAR Hold] escitalopram (LEXAPRO) tablet 10 mg  10 mg Oral Daily Rivera Tovar MD   10 mg at 08/09/19 0823   • heparin infusion 2 units/mL in 0.9% NaCl    PRN Rivera Tovar MD   1,000 mL at 08/09/19 1344   • [MAR Hold] HYDROcodone-acetaminophen (NORCO) 5-325 MG per tablet 1 tablet  1 tablet Oral Q4H PRN Rivera Tovar MD       • losartan (COZAAR) tablet 100 mg  100 mg Oral Daily Rivera Tovar MD   100 mg at 08/09/19 0823   • [MAR Hold] Morphine sulfate (PF) injection 2 mg  2 mg Intravenous Q4H PRN Rivera Tovar MD       • nitroglycerin 50 mg/250 mL (0.2 mg/mL) infusion  10-50 mcg/min Intravenous Titrated Rivera Tovar MD   Stopped at 08/09/19 0614   • [MAR Hold] predniSONE (DELTASONE) tablet 50 mg  50 mg Oral Daily Rivera Tovar MD   50 mg at 08/09/19 0823   • [MAR Hold] sodium chloride 0.9 % flush 10 mL  10 mL Intravenous PRN Krissy Braun APRN       • [MAR Hold] sodium chloride 0.9 % flush 3 mL  3 mL Intravenous Q12H Rivera Tovar MD   3 mL at 08/09/19 0814   • [MAR Hold] sodium chloride 0.9 % flush 3-10 mL  3-10 mL Intravenous PRN Rivera Tovar MD       • sodium chloride 0.9 % infusion  100 mL/hr Intravenous Continuous Rivera Tovar  mL/hr at 08/09/19 1125 100 mL/hr at 08/09/19 1125       Vital Sign Min/Max for last 24 hours  Temp  Min: 97.5 °F (36.4 °C)  Max: 98.7 °F (37.1 °C)   BP  Min: 116/69  Max: 148/89   Pulse  Min: 53  Max: 67   Resp  Min: 12  Max: 18   SpO2  Min: 93 %  Max: 100 %   No Data Recorded   Weight  Min: 88.6 kg (195 lb 4.8 oz)  Max: 91.6 kg  "(202 lb)     Flowsheet Rows      First Filed Value   Admission Height  182.9 cm (72\") Documented at 08/08/2019 1715   Admission Weight  91.6 kg (202 lb) Documented at 08/08/2019 1715          Results for orders placed in visit on 08/02/19   Adult Stress Echo W/ Cont or Stress Agent if Necessary Per Protocol    Narrative · Estimated EF = 55%.  · Left ventricular systolic function is normal.  · Abnormal stress echo with echocardiographic evidence for myocardial   ischemia located in the anterior wall.          Physical Exam:    General Appearance: Awake, alert, in no acute distress  Eyes: Pupils equal and reactive    Ears: Appear intact with no abnormalities noted  Nose: Nares normal, no drainage  Neck: supple, trachea midline, no carotid bruit and no JVD  Back: no kyphosis present,    Lungs: respirations regular, respirations even and respirations unlabored    Heart: normal S1, S2, no murmurs gallops or rubs  Abdomen: normal bowel sounds, no tenderness   Skin: no bleeding, bruising or rash  Extremities: no cyanosis  Psychiatric/Behavioral: Negative for agitation, behavioral problems, confusion, the patient does  appear to be nervous/anxious.          Results Review:   I reviewed the patient's new clinical results.  I reviewed the patient's new imaging results and agree with the interpretation.  I reviewed the patient's other test results and agree with the interpretation  I personally viewed and interpreted the patient's EKG/Telemetry data  Discussed with patient    Reviewed available prior notes, consults, prior visits, laboratory findings, radiology and cardiology relevant reports. Updated chart as applicable. I have reviewed the patient's medical history in detail and updated the computerized patient record as relevant.      Updated patient regarding any new or relevant abnormalities on review of records or any new findings on physical exam. Mentioned to patient about purpose of visit and desirable health short " and long term goals and objectives.     Assessment/Plan       Chest pain  Abnormal cardiac stress test raising suspicion for underlying hemodynamically significant obstructive coronary artery disease .   Essential hypertension    Plan      Recommend cardiac catheterization, selective coronary angiography, left ventriculography and percutaneous coronary intervention with application of arteriotomy hemostatic closure device.    I discussed cardiac catheterization, the procedure, risks (including bleeding, infection, vascular damage [including minor oozing, bruising, bleeding, and up to and including but not limited to the need for vascular surgery, emergency cardiothoracic surgery, contrast reaction, renal failure, respiratory failure, heart attack, stroke, arrhythmia and even death), benefits, and alternatives and the patient has voiced understanding and is willing to proceed.    Adequate pre-hydration and post cardiac catheterization hydration.  Premedications as required and indicated for cardiac catheterization.    No contraindication to drug eluting stent placement if required  Further recommendations pending results of cardiac catheterization      Supportive care  Telemetry  Optimal medical therapy    Deep vein thrombosis prophylaxis/precautions  Appropriate diet, fluid, sodium, caffeine, stimulants intake     Questions were encouraged, asked and answered to the patient's  understanding and satisfaction.    Compliance to diet and medications   Avoid NSAIDS    Rivera Tovar MD  08/09/19  1:48 PM    EMR Dragon/Transcription was used to dictate part of this note

## 2019-08-09 NOTE — PLAN OF CARE
Problem: Patient Care Overview  Goal: Plan of Care Review  Outcome: Ongoing (interventions implemented as appropriate)   08/09/19 0615   Coping/Psychosocial   Plan of Care Reviewed With patient   Plan of Care Review   Progress no change   OTHER   Outcome Summary VSS, patient admitted for chest pain the evening before scheduled heart cath, NPO, nitro gtt stopped, pt currently pain free, will continue to monitor for changes.       Problem: Cardiac: ACS (Acute Coronary Syndrome) (Adult)  Goal: Signs and Symptoms of Listed Potential Problems Will be Absent, Minimized or Managed (Cardiac: ACS)  Outcome: Ongoing (interventions implemented as appropriate)   08/09/19 0615   Goal/Outcome Evaluation   Problems Assessed (Acute Coronary Syndrome) all   Problems Present (Acute Coronary Syn) none

## 2019-08-10 ENCOUNTER — APPOINTMENT (OUTPATIENT)
Dept: CARDIOLOGY | Facility: HOSPITAL | Age: 74
End: 2019-08-10

## 2019-08-10 ENCOUNTER — APPOINTMENT (OUTPATIENT)
Dept: ULTRASOUND IMAGING | Facility: HOSPITAL | Age: 74
End: 2019-08-10

## 2019-08-10 ENCOUNTER — APPOINTMENT (OUTPATIENT)
Dept: PULMONOLOGY | Facility: HOSPITAL | Age: 74
End: 2019-08-10

## 2019-08-10 ENCOUNTER — APPOINTMENT (OUTPATIENT)
Dept: CT IMAGING | Facility: HOSPITAL | Age: 74
End: 2019-08-10

## 2019-08-10 LAB
ANION GAP SERPL CALCULATED.3IONS-SCNC: 7 MMOL/L (ref 4–13)
ARTICHOKE IGE QN: 149 MG/DL (ref 0–99)
BH CV ECHO MEAS - AO MAX PG (FULL): 5.8 MMHG
BH CV ECHO MEAS - AO MAX PG: 7.4 MMHG
BH CV ECHO MEAS - AO MEAN PG (FULL): 3 MMHG
BH CV ECHO MEAS - AO MEAN PG: 4 MMHG
BH CV ECHO MEAS - AO ROOT AREA (BSA CORRECTED): 1.8
BH CV ECHO MEAS - AO ROOT AREA: 11.3 CM^2
BH CV ECHO MEAS - AO ROOT DIAM: 3.8 CM
BH CV ECHO MEAS - AO V2 MAX: 136 CM/SEC
BH CV ECHO MEAS - AO V2 MEAN: 91.6 CM/SEC
BH CV ECHO MEAS - AO V2 VTI: 30.9 CM
BH CV ECHO MEAS - AVA(I,A): 1.5 CM^2
BH CV ECHO MEAS - AVA(I,D): 1.5 CM^2
BH CV ECHO MEAS - AVA(V,A): 1.5 CM^2
BH CV ECHO MEAS - AVA(V,D): 1.5 CM^2
BH CV ECHO MEAS - BSA(HAYCOCK): 2.1 M^2
BH CV ECHO MEAS - BSA: 2.1 M^2
BH CV ECHO MEAS - BZI_BMI: 27.8 KILOGRAMS/M^2
BH CV ECHO MEAS - BZI_METRIC_HEIGHT: 180.3 CM
BH CV ECHO MEAS - BZI_METRIC_WEIGHT: 90.3 KG
BH CV ECHO MEAS - EDV(CUBED): 160.1 ML
BH CV ECHO MEAS - EDV(MOD-SP4): 94 ML
BH CV ECHO MEAS - EDV(TEICH): 143.1 ML
BH CV ECHO MEAS - EF(CUBED): 72.3 %
BH CV ECHO MEAS - EF(MOD-SP4): 47.7 %
BH CV ECHO MEAS - EF(TEICH): 63.5 %
BH CV ECHO MEAS - ESV(CUBED): 44.4 ML
BH CV ECHO MEAS - ESV(MOD-SP4): 49.2 ML
BH CV ECHO MEAS - ESV(TEICH): 52.3 ML
BH CV ECHO MEAS - FS: 34.8 %
BH CV ECHO MEAS - IVS/LVPW: 0.82
BH CV ECHO MEAS - IVSD: 0.88 CM
BH CV ECHO MEAS - LA DIMENSION: 4 CM
BH CV ECHO MEAS - LA/AO: 1.1
BH CV ECHO MEAS - LAT PEAK E' VEL: 6.6 CM/SEC
BH CV ECHO MEAS - LV DIASTOLIC VOL/BSA (35-75): 44.7 ML/M^2
BH CV ECHO MEAS - LV MASS(C)D: 201.4 GRAMS
BH CV ECHO MEAS - LV MASS(C)DI: 95.7 GRAMS/M^2
BH CV ECHO MEAS - LV MAX PG: 1.6 MMHG
BH CV ECHO MEAS - LV MEAN PG: 1 MMHG
BH CV ECHO MEAS - LV SYSTOLIC VOL/BSA (12-30): 23.4 ML/M^2
BH CV ECHO MEAS - LV V1 MAX: 63.5 CM/SEC
BH CV ECHO MEAS - LV V1 MEAN: 36.4 CM/SEC
BH CV ECHO MEAS - LV V1 VTI: 14.3 CM
BH CV ECHO MEAS - LVIDD: 5.4 CM
BH CV ECHO MEAS - LVIDS: 3.5 CM
BH CV ECHO MEAS - LVLD AP4: 8.3 CM
BH CV ECHO MEAS - LVLS AP4: 7.3 CM
BH CV ECHO MEAS - LVOT AREA (M): 3.1 CM^2
BH CV ECHO MEAS - LVOT AREA: 3.1 CM^2
BH CV ECHO MEAS - LVOT DIAM: 2 CM
BH CV ECHO MEAS - LVPWD: 1.1 CM
BH CV ECHO MEAS - MED PEAK E' VEL: 6.3 CM/SEC
BH CV ECHO MEAS - MV A MAX VEL: 51.9 CM/SEC
BH CV ECHO MEAS - MV DEC SLOPE: 530 CM/SEC^2
BH CV ECHO MEAS - MV DEC TIME: 0.16 SEC
BH CV ECHO MEAS - MV E MAX VEL: 69.8 CM/SEC
BH CV ECHO MEAS - MV E/A: 1.3
BH CV ECHO MEAS - MV P1/2T MAX VEL: 97 CM/SEC
BH CV ECHO MEAS - MV P1/2T: 53.6 MSEC
BH CV ECHO MEAS - MVA P1/2T LCG: 2.3 CM^2
BH CV ECHO MEAS - MVA(P1/2T): 4.1 CM^2
BH CV ECHO MEAS - RAP SYSTOLE: 5 MMHG
BH CV ECHO MEAS - RVSP: 20.8 MMHG
BH CV ECHO MEAS - SI(AO): 166.5 ML/M^2
BH CV ECHO MEAS - SI(CUBED): 55 ML/M^2
BH CV ECHO MEAS - SI(LVOT): 21.3 ML/M^2
BH CV ECHO MEAS - SI(MOD-SP4): 21.3 ML/M^2
BH CV ECHO MEAS - SI(TEICH): 43.2 ML/M^2
BH CV ECHO MEAS - SV(AO): 350.4 ML
BH CV ECHO MEAS - SV(CUBED): 115.7 ML
BH CV ECHO MEAS - SV(LVOT): 44.9 ML
BH CV ECHO MEAS - SV(MOD-SP4): 44.8 ML
BH CV ECHO MEAS - SV(TEICH): 90.9 ML
BH CV ECHO MEAS - TR MAX VEL: 199 CM/SEC
BH CV ECHO MEASUREMENTS AVERAGE E/E' RATIO: 10.82
BUN BLD-MCNC: 13 MG/DL (ref 5–21)
BUN/CREAT SERPL: 18.3 (ref 7–25)
CALCIUM SPEC-SCNC: 8.5 MG/DL (ref 8.4–10.4)
CHLORIDE SERPL-SCNC: 108 MMOL/L (ref 98–110)
CHOLEST SERPL-MCNC: 185 MG/DL (ref 130–200)
CO2 SERPL-SCNC: 25 MMOL/L (ref 24–31)
CREAT BLD-MCNC: 0.71 MG/DL (ref 0.5–1.4)
DEPRECATED RDW RBC AUTO: 41.7 FL (ref 37–54)
ERYTHROCYTE [DISTWIDTH] IN BLOOD BY AUTOMATED COUNT: 12.7 % (ref 12.3–15.4)
GFR SERPL CREATININE-BSD FRML MDRD: 109 ML/MIN/1.73
GLUCOSE BLD-MCNC: 86 MG/DL (ref 70–100)
HBA1C MFR BLD: 5.5 % (ref 4.8–5.9)
HCT VFR BLD AUTO: 35.8 % (ref 37.5–51)
HDLC SERPL-MCNC: 23 MG/DL
HGB BLD-MCNC: 12.6 G/DL (ref 13–17.7)
LDLC/HDLC SERPL: 5.79 {RATIO}
LEFT ATRIUM VOLUME INDEX: 25 ML/M2
MCH RBC QN AUTO: 32.1 PG (ref 26.6–33)
MCHC RBC AUTO-ENTMCNC: 35.2 G/DL (ref 31.5–35.7)
MCV RBC AUTO: 91.3 FL (ref 79–97)
PLATELET # BLD AUTO: 234 10*3/MM3 (ref 140–450)
PMV BLD AUTO: 10.4 FL (ref 6–12)
POTASSIUM BLD-SCNC: 4 MMOL/L (ref 3.5–5.3)
RBC # BLD AUTO: 3.92 10*6/MM3 (ref 4.14–5.8)
SODIUM BLD-SCNC: 140 MMOL/L (ref 135–145)
TRIGL SERPL-MCNC: 144 MG/DL (ref 0–149)
WBC NRBC COR # BLD: 8.83 10*3/MM3 (ref 3.4–10.8)

## 2019-08-10 PROCEDURE — 80061 LIPID PANEL: CPT | Performed by: INTERNAL MEDICINE

## 2019-08-10 PROCEDURE — 93970 EXTREMITY STUDY: CPT

## 2019-08-10 PROCEDURE — 93306 TTE W/DOPPLER COMPLETE: CPT

## 2019-08-10 PROCEDURE — 71275 CT ANGIOGRAPHY CHEST: CPT

## 2019-08-10 PROCEDURE — 99231 SBSQ HOSP IP/OBS SF/LOW 25: CPT | Performed by: THORACIC SURGERY (CARDIOTHORACIC VASCULAR SURGERY)

## 2019-08-10 PROCEDURE — 85027 COMPLETE CBC AUTOMATED: CPT | Performed by: INTERNAL MEDICINE

## 2019-08-10 PROCEDURE — 93970 EXTREMITY STUDY: CPT | Performed by: SURGERY

## 2019-08-10 PROCEDURE — 93880 EXTRACRANIAL BILAT STUDY: CPT

## 2019-08-10 PROCEDURE — 83036 HEMOGLOBIN GLYCOSYLATED A1C: CPT | Performed by: INTERNAL MEDICINE

## 2019-08-10 PROCEDURE — 94799 UNLISTED PULMONARY SVC/PX: CPT

## 2019-08-10 PROCEDURE — 99232 SBSQ HOSP IP/OBS MODERATE 35: CPT | Performed by: PHYSICIAN ASSISTANT

## 2019-08-10 PROCEDURE — 0 IOPAMIDOL PER 1 ML: Performed by: INTERNAL MEDICINE

## 2019-08-10 PROCEDURE — 93880 EXTRACRANIAL BILAT STUDY: CPT | Performed by: SURGERY

## 2019-08-10 PROCEDURE — 94010 BREATHING CAPACITY TEST: CPT

## 2019-08-10 PROCEDURE — 94760 N-INVAS EAR/PLS OXIMETRY 1: CPT

## 2019-08-10 PROCEDURE — 80048 BASIC METABOLIC PNL TOTAL CA: CPT | Performed by: INTERNAL MEDICINE

## 2019-08-10 PROCEDURE — 93306 TTE W/DOPPLER COMPLETE: CPT | Performed by: INTERNAL MEDICINE

## 2019-08-10 PROCEDURE — 94010 BREATHING CAPACITY TEST: CPT | Performed by: INTERNAL MEDICINE

## 2019-08-10 PROCEDURE — 25010000002 ENOXAPARIN PER 10 MG: Performed by: INTERNAL MEDICINE

## 2019-08-10 RX ORDER — AMIODARONE HYDROCHLORIDE 200 MG/1
400 TABLET ORAL EVERY 12 HOURS SCHEDULED
Status: DISCONTINUED | OUTPATIENT
Start: 2019-08-10 | End: 2019-08-12 | Stop reason: HOSPADM

## 2019-08-10 RX ORDER — PRENATAL VIT/IRON FUM/FOLIC AC 27MG-0.8MG
1 TABLET ORAL DAILY
Status: DISCONTINUED | OUTPATIENT
Start: 2019-08-10 | End: 2019-08-12 | Stop reason: HOSPADM

## 2019-08-10 RX ORDER — ATORVASTATIN CALCIUM 10 MG/1
20 TABLET, FILM COATED ORAL NIGHTLY
Status: DISCONTINUED | OUTPATIENT
Start: 2019-08-10 | End: 2019-08-12 | Stop reason: HOSPADM

## 2019-08-10 RX ADMIN — AMIODARONE HYDROCHLORIDE 400 MG: 200 TABLET ORAL at 14:22

## 2019-08-10 RX ADMIN — ENOXAPARIN SODIUM 90 MG: 100 INJECTION SUBCUTANEOUS at 06:23

## 2019-08-10 RX ADMIN — SODIUM CHLORIDE 100 ML/HR: 9 INJECTION, SOLUTION INTRAVENOUS at 03:11

## 2019-08-10 RX ADMIN — ASPIRIN 81 MG: 81 TABLET, CHEWABLE ORAL at 08:25

## 2019-08-10 RX ADMIN — SODIUM CHLORIDE, PRESERVATIVE FREE 3 ML: 5 INJECTION INTRAVENOUS at 21:21

## 2019-08-10 RX ADMIN — ENOXAPARIN SODIUM 90 MG: 100 INJECTION SUBCUTANEOUS at 17:42

## 2019-08-10 RX ADMIN — LOSARTAN POTASSIUM 100 MG: 50 TABLET, FILM COATED ORAL at 08:25

## 2019-08-10 RX ADMIN — ATORVASTATIN CALCIUM 20 MG: 10 TABLET, FILM COATED ORAL at 21:21

## 2019-08-10 RX ADMIN — IOPAMIDOL 125 ML: 755 INJECTION, SOLUTION INTRAVENOUS at 13:26

## 2019-08-10 RX ADMIN — PRENATAL VIT W/ FE FUMARATE-FA TAB 27-0.8 MG 1 TABLET: 27-0.8 TAB at 14:22

## 2019-08-10 RX ADMIN — ATENOLOL 25 MG: 25 TABLET ORAL at 08:26

## 2019-08-10 RX ADMIN — ESCITALOPRAM 10 MG: 10 TABLET, FILM COATED ORAL at 08:25

## 2019-08-10 NOTE — PLAN OF CARE
Problem: Patient Care Overview  Goal: Plan of Care Review  Outcome: Ongoing (interventions implemented as appropriate)   08/09/19 9040   Coping/Psychosocial   Plan of Care Reviewed With patient   Plan of Care Review   Progress no change   OTHER   Outcome Summary Patient had no complaints of chest pain this shift. Had a heart cath today and was a CT consult. Pre op teaching complete for CABG. Dr. Blackubrn seen patient today and discussed CABG.        Problem: Cardiac: ACS (Acute Coronary Syndrome) (Adult)  Goal: Signs and Symptoms of Listed Potential Problems Will be Absent, Minimized or Managed (Cardiac: ACS)  Outcome: Ongoing (interventions implemented as appropriate)      Problem: Cardiac Catheterization (Diagnostic/Interventional) (Adult)  Goal: Signs and Symptoms of Listed Potential Problems Will be Absent, Minimized or Managed (Cardiac Catheterization)  Outcome: Ongoing (interventions implemented as appropriate)    Goal: Anesthesia/Sedation Recovery  Outcome: Ongoing (interventions implemented as appropriate)

## 2019-08-10 NOTE — PROGRESS NOTES
"Chief Complaint   Patient presents with   • Chest Pain       No events overnight.  No chest pain.  Testing proceeding as per routine  Ready for surgery.  Joined by his son and his wife.      Visit Vitals  /73 (BP Location: Right arm, Patient Position: Sitting)   Pulse 53   Temp 98.2 °F (36.8 °C) (Oral)   Resp 20   Ht 180.3 cm (71\")   Wt 90.3 kg (199 lb)   SpO2 98%   BMI 27.75 kg/m²         Intake/Output Summary (Last 24 hours) at 8/10/2019 1612  Last data filed at 8/10/2019 1500  Gross per 24 hour   Intake 2740 ml   Output 1450 ml   Net 1290 ml       Physical Exam:    General: NAD, In good spirits  Cardiovascular: RRR, No murmur, rubs, or gallops.    Pulmonary: CTAB, No wheezing, rubs, or rales.  Abdomen: soft, Non-distended, and non-tender  Extremities: warm, GLASS  Neurologic:  No focal deficits, CN II-XII intact grossly.                        Interpretation Summary     · Left ventricular systolic function is low normal. EF 51-55%.  · Mild aortic valve stenosis is present.  · Normal size and function of the right ventricle.      Patient Hx Of Height, Weight, and Vitals     Height Weight BSA (Calculated - sq m) BMI (kg/m2) Pulse BP   180.3 cm (71\") 90.3 kg (199 lb) 2.1 sq meters 27.81 58 135/84   Reason For Exam     Chest Pain   Cardiac History     Diagnosis Date Comment Source   Hypertension   Provider   Study Description     2D echo was performed with color flow and doppler. The study is technically adequate for diagnosis.Normal sinus was the predominant rhythm observed during the procedure.   Echocardiogram Findings     Left Ventricle Left ventricular systolic function is low normal. Estimated EF appears to be in the range of 51 - 55%. Normal left ventricular cavity size and wall thickness noted. All left ventricular wall segments contract normally.   Right Ventricle Normal right ventricular cavity size, wall thickness, systolic function and septal motion noted.   Left Atrium Normal left atrial size and " volume noted.   Right Atrium Normal right atrial size noted.   Aortic Valve The aortic valve is structurally normal. The valve appears trileaflet. No aortic valve regurgitation is present. Mild aortic valve stenosis is present.   Mitral Valve The mitral valve is normal in structure. Trace mitral valve regurgitation is present. No significant mitral valve stenosis is present.   Tricuspid Valve The tricuspid valve is normal. No evidence of tricuspid valve stenosis is present. Physiologic tricuspid valve regurgitation is present. Estimated right ventricular systolic pressure from tricuspid regurgitation is normal (<35 mmHg).   Pulmonic Valve The pulmonic valve is structurally normal. There is no significant pulmonic valve stenosis present. There is no pulmonic valve regurgitation present.   Greater Vessels No dilation of the aortic root is present. No dilation of the sinuses of Valsalva is present.   Pericardium The pericardium is normal. There is no evidence of pericardial effusion.      LV Measurements     Dimensions   LVIDd 5.4 cm      IVSd 0.88 cm      FS 34.8 %      ESV(cubed) 44.4 ml      EDV(cubed) 160.1 ml      LVOT area 3.1 cm^2      LVOT diam 2 cm      Diastolic Filling   MV E max hal 69.8 cm/sec      MV A max hal 51.9 cm/sec      MV dec time 0.16 sec      MV E/A 1.3       LA Volume Index 25 mL/m2      Med Peak E' Hal 6.3 cm/sec      Lat Peak E' Hal 6.6 cm/sec      Avg E/e' ratio 10.82       Shunt Ratio   SV(LVOT) 44.9 ml       Dimensions   LVIDs 3.5 cm      LVPWd 1.1 cm      IVS/LVPW 0.82       LV Sys Vol (BSA corrected) 23.4 ml/m^2      LV Sanchez Vol (BSA corrected) 44.7 ml/m^2      LV mass(C)d 201.4 grams      EDV(MOD-sp4) 94 ml      ESV(MOD-sp4) 49.2 ml      Systolic Function   SV(MOD-sp4) 44.8 ml      SI(MOD-sp4) 21.3 ml/m^2      EF(MOD-sp4) 47.7 %         LA Measurements     LA Dimensions   LA dimension 4 cm      LA Volume Index 25 mL/m2         Aortic Valve Measurements     Stenosis   LVOT diam 2 cm       LV V1 max 63.5 cm/sec      LV V1 max PG 1.6 mmHg      LV V1 mean PG 1 mmHg      LV V1 VTI 14.3 cm      Ao pk apollo 136 cm/sec       Stenosis   Ao max PG 7.4 mmHg      Ao mean PG 4 mmHg      Ao V2 VTI 30.9 cm      CORNELIUS(I,D) 1.5 cm^2         Mitral Valve Measurements     Stenosis   MV P1/2t 53.6 msec      MVA(P1/2t) 4.1 cm^2      MV dec slope 530 cm/sec^2      MV dec time 0.16 sec          Tricuspid Valve Measurements     Regurgitation   TR max apollo 199 cm/sec      RVSP(TR) 20.8 mmHg      RAP systole 5 mmHg      PISA   TR max apollo 199 cm/sec          Greater Vessels Measurements     Ao root diam 3.8 cm      Ao root area (BSA corrected) 1.8              Impression:  Left main and three-vessel coronary artery disease  Plaque present carotid artery disease with antegrade vertebral arterial flow  Unstable angina  Previous history of chronic tobacco use remote  Adequate lung function.    PLVF    Medical decision-making/recommendations/plan:  STS risk analysis is completed and he is a mildly elevated risk operative candidate.  Risks of the CABG were reviewed in details as well as the alternatives and benefits.    All questions have been answered.  Plan urgent CABG Monday as long as he is cardiac symptoms and/or events free.  He verbalized understanding and is agreeable to proceed forward.

## 2019-08-10 NOTE — PLAN OF CARE
Problem: Patient Care Overview  Goal: Plan of Care Review  Outcome: Ongoing (interventions implemented as appropriate)   08/10/19 0402   Coping/Psychosocial   Plan of Care Reviewed With patient   Plan of Care Review   Progress no change   OTHER   Outcome Summary VSS, no c/o pain or soa this shift, up ad jose, iv fluids continued, plans for CABG the first of the week, will continue to monitor for changes.       Problem: Cardiac: ACS (Acute Coronary Syndrome) (Adult)  Goal: Signs and Symptoms of Listed Potential Problems Will be Absent, Minimized or Managed (Cardiac: ACS)  Outcome: Ongoing (interventions implemented as appropriate)   08/10/19 0402   Goal/Outcome Evaluation   Problems Assessed (Acute Coronary Syndrome) all   Problems Present (Acute Coronary Syn) none       Problem: Cardiac Catheterization (Diagnostic/Interventional) (Adult)  Goal: Signs and Symptoms of Listed Potential Problems Will be Absent, Minimized or Managed (Cardiac Catheterization)  Outcome: Ongoing (interventions implemented as appropriate)   08/10/19 0402   Goal/Outcome Evaluation   Problems Assessed (Cardiac Catheterization) all   Problems Present (Cardiac Cath) none     Goal: Anesthesia/Sedation Recovery  Outcome: Outcome(s) achieved Date Met: 08/10/19   08/10/19 0402   Goal/Outcome Evaluation   Anesthesia/Sedation Recovery recovered to baseline

## 2019-08-10 NOTE — H&P (VIEW-ONLY)
Chief Complaint   Patient presents with   • Chest Pain         Subjective     History of Present Illness    74-year-old male with salient history of hypercholesteremia, hypertension, a family history of heart failure, and previous history of chronic tobacco use presents with a previous couple week history of pressure dull achy to his back intrascapular.  He had additional new chest pain that was substernal prompting him to present to the emergency department where he was admitted..  He is chest pain-free at time of my assessment.  He denies proximal nocturnal dyspnea, lower extremity edema, fatigue.  Work-up demonstrated a stress echo that was concerning for ischemia with subsequent left heart catheterization demonstrating severe left main and three-vessel coronary disease.  Salient history includes history of vasectomy and he is a remote smoker.    Review of Systems   Constitutional: Negative for activity change, appetite change, chills, diaphoresis, fatigue, fever and unexpected weight change.   HENT: Negative for dental problem, hearing loss, nosebleeds, sore throat, trouble swallowing and voice change.    Eyes: Negative for photophobia, redness and visual disturbance.   Respiratory: Negative for apnea, cough, chest tightness, shortness of breath, wheezing and stridor.    Cardiovascular: Positive for chest pain. Negative for palpitations and leg swelling.   Gastrointestinal: Negative for abdominal distention, abdominal pain, blood in stool, constipation, diarrhea, nausea and vomiting.   Endocrine: Negative for cold intolerance, heat intolerance, polyphagia and polyuria.   Genitourinary: Negative for decreased urine volume, difficulty urinating, dysuria, flank pain, frequency, hematuria and urgency.   Musculoskeletal: Positive for arthralgias. Negative for back pain, gait problem, joint swelling, myalgias and neck pain.   Skin: Negative for pallor, rash and wound.   Allergic/Immunologic: Negative for  immunocompromised state.   Neurological: Negative for dizziness, tremors, seizures, syncope, speech difficulty, weakness, light-headedness, numbness and headaches.   Hematological: Does not bruise/bleed easily.   Psychiatric/Behavioral: Negative for confusion, sleep disturbance and suicidal ideas. The patient is not nervous/anxious.           Past Medical History:   Diagnosis Date   • Hypertension      Past Surgical History:   Procedure Laterality Date   • HERNIA REPAIR     • VASECTOMY       Family History   Problem Relation Age of Onset   • Heart disease Father    • Heart disease Maternal Grandmother    • Heart disease Paternal Grandmother      Social History     Tobacco Use   • Smoking status: Former Smoker   • Smokeless tobacco: Never Used   Substance Use Topics   • Alcohol use: Yes   • Drug use: No     Medications Prior to Admission   Medication Sig Dispense Refill Last Dose   • amLODIPine-benazepril (LOTREL 5-10) 5-10 MG per capsule Take 1 capsule by mouth Every Night.      • atenolol (TENORMIN) 25 MG tablet Take 13 mg by mouth Daily.      • diclofenac sodium (VOTAREN XR) 100 MG 24 hr tablet Take 75 mg by mouth 2 (Two) Times a Day.      • escitalopram (LEXAPRO) 10 MG tablet Take 10 mg by mouth Daily.      • HYDROcodone-acetaminophen (NORCO) 5-325 MG per tablet Take 1 tablet by mouth Every 4 (Four) Hours As Needed for Severe Pain . 15 tablet 0    • losartan (COZAAR) 50 MG tablet Take 100 mg by mouth Daily.      • Multiple Vitamins-Minerals (ICAPS AREDS 2 PO) Take  by mouth 2 (Two) Times a Day.      • predniSONE (DELTASONE) 50 MG tablet Take 1 tablet by mouth Daily. 4 tablet 0      Allergies:  Aleve [naproxen]    Objective      Vital Signs  Temp:  [97.7 °F (36.5 °C)-98.8 °F (37.1 °C)] 98.8 °F (37.1 °C)  Heart Rate:  [55-69] 58  Resp:  [12-20] 18  BP: (119-163)/(70-94) 119/70    Physical Exam   Constitutional: He is oriented to person, place, and time. He appears well-developed.   HENT:   Head: Normocephalic and  atraumatic.   Mouth/Throat: Oropharynx is clear and moist.   Eyes: EOM are normal. Pupils are equal, round, and reactive to light.   Neck: Normal range of motion. Neck supple. No JVD present. No tracheal deviation present. No thyromegaly present.   Cardiovascular: Normal rate, regular rhythm, normal heart sounds and intact distal pulses. Exam reveals no gallop and no friction rub.   No murmur heard.  Pulmonary/Chest: Effort normal and breath sounds normal. No respiratory distress. He has no wheezes. He has no rales. He exhibits no tenderness.   Abdominal: Soft. He exhibits no distension. There is no tenderness.   Musculoskeletal: Normal range of motion. He exhibits no edema.   Lymphadenopathy:     He has no cervical adenopathy.   Neurological: He is alert and oriented to person, place, and time. No cranial nerve deficit.   Skin: Skin is warm and dry.   Psychiatric: He has a normal mood and affect.       Results Review:   Xr Chest 1 View    Result Date: 8/8/2019  Narrative: EXAMINATION: Chest 01/20/2019  HISTORY: Chest pain  FINDINGS: Upright frontal projection of the chest demonstrates no evidence of acute cardiopulmonary disease. There is no effusion or free air present. There is spondylosis within the mid and lower thoracic spine.      Impression: . No acute disease. This report was finalized on 08/08/2019 17:47 by Dr. Bebeto Floyd MD.       Cardiac Catheterization Operative Report        Patient was referred for cardiac catheterization .       Procedure Details     Diagnostic coronary angiography was performed with 5 [place] JL4 and JR 4 coronary catheters.       Pigtail catheter for left ventriculography and left heart pressure measurements     Coronary angiogram were performed in [place] and [place] projection to evaluate the coronary arterial systems.          Before all coronary angiograms were obtained, a femoral angiogram was performed and the arteriotomy was assessed for suitability for a closure device.        A 6/7 Fr Mynx closure device was used to achieve hemostasis.  The patient tolerated the procedure well, and there were no immediate complications.     ____________________________________________________________________________________________________________________________________________   Selective coronary angiography:     Coronary anatomy as below  ____________________________________________________________________________________________________________________________________________  Estimated Blood Loss:  Minimal         Complications:  None; patient tolerated the procedure well.           Disposition: Cardiovascular observation unit              Condition: stable            I supervised the administration of conscious sedation by nursing staff throughout the case.       Immediately prior to the procedure the patient was re-examined and subsequently the  first dose was given at 1351 Hours  and the end of my face-to-face encounter was at 1410 Hours.          During the case, continuous pulse oximetry, heart rate, blood pressure, and patient status were monitored.         ____________________________________________________________________________________________________________________________________________     Conclusion of cardiac catheterization        Distal left main coronary artery has 95% stenosis.  Left anterior descending coronary artery arises normally and proximally has a 90% stenosis  Mid to distal left anterior descending coronary artery has a 90% stenosis  The second diagonal branch has a 90% stenosis  First diagonal branch is small.  The left circumflex coronary artery is large and distally has a 90% stenosis.  Right coronary artery is dominant with distal 90% stenosis.  50 to 60% stenosis proximally.  Distal vessel has atherosclerotic plaques.  Bilateral internal mammary arteries are patent and can be used as a conduit  Left ventricular end-diastolic pressure moderately elevated to  21 mmHg.  No gradient across aortic valve pullback  Left ventriculogram not performed given critical left main stenosis.  By echocardiogram ejection fraction is preserved        I reviewed the patient's new clinical results.  Discussed with patient and wife      Assessment/Plan       Chest pain    Left main coronary artery disease  Unstable angina        I discussed the patients findings and my recommendations with patient and wife, We discussed the options for treatment of coronary artery disease to include medical therapy, coronary stenting, and surgical revascularization.  We discussed the pros and cons of each option and how it pertains to the current case.  The STS Risk score was calculated using the STS Risk Calculator and discussed with the patient .  Coronary artery bypass grafting is best option given patient's findings and risk factors.  We discussed the operative conduct and expected hospital and outpatient recovery.  Risks were discussed to include but not limited to bleeding, infection, stroke, heart attack, need for additional procedures, anesthesia risk, organ dysfunction and/or death, prolonged mechanical ventilation, prolonged ICU stay, chronic pain syndromes, sternal nonunion, and/or death.  We discussed the need to utilize all medical treatments additionally prescribed post surgery.       The patient and wife understands the risks, benefits, and alternatives and he wishes to proceed forward with surgery.    Will proceed with urgent planned CABG 8/12/2019

## 2019-08-10 NOTE — CONSULTS
Chief Complaint   Patient presents with   • Chest Pain         Subjective     History of Present Illness    74-year-old male with salient history of hypercholesteremia, hypertension, a family history of heart failure, and previous history of chronic tobacco use presents with a previous couple week history of pressure dull achy to his back intrascapular.  He had additional new chest pain that was substernal prompting him to present to the emergency department where he was admitted..  He is chest pain-free at time of my assessment.  He denies proximal nocturnal dyspnea, lower extremity edema, fatigue.  Work-up demonstrated a stress echo that was concerning for ischemia with subsequent left heart catheterization demonstrating severe left main and three-vessel coronary disease.  Salient history includes history of vasectomy and he is a remote smoker.    Review of Systems   Constitutional: Negative for activity change, appetite change, chills, diaphoresis, fatigue, fever and unexpected weight change.   HENT: Negative for dental problem, hearing loss, nosebleeds, sore throat, trouble swallowing and voice change.    Eyes: Negative for photophobia, redness and visual disturbance.   Respiratory: Negative for apnea, cough, chest tightness, shortness of breath, wheezing and stridor.    Cardiovascular: Positive for chest pain. Negative for palpitations and leg swelling.   Gastrointestinal: Negative for abdominal distention, abdominal pain, blood in stool, constipation, diarrhea, nausea and vomiting.   Endocrine: Negative for cold intolerance, heat intolerance, polyphagia and polyuria.   Genitourinary: Negative for decreased urine volume, difficulty urinating, dysuria, flank pain, frequency, hematuria and urgency.   Musculoskeletal: Positive for arthralgias. Negative for back pain, gait problem, joint swelling, myalgias and neck pain.   Skin: Negative for pallor, rash and wound.   Allergic/Immunologic: Negative for  immunocompromised state.   Neurological: Negative for dizziness, tremors, seizures, syncope, speech difficulty, weakness, light-headedness, numbness and headaches.   Hematological: Does not bruise/bleed easily.   Psychiatric/Behavioral: Negative for confusion, sleep disturbance and suicidal ideas. The patient is not nervous/anxious.           Past Medical History:   Diagnosis Date   • Hypertension      Past Surgical History:   Procedure Laterality Date   • HERNIA REPAIR     • VASECTOMY       Family History   Problem Relation Age of Onset   • Heart disease Father    • Heart disease Maternal Grandmother    • Heart disease Paternal Grandmother      Social History     Tobacco Use   • Smoking status: Former Smoker   • Smokeless tobacco: Never Used   Substance Use Topics   • Alcohol use: Yes   • Drug use: No     Medications Prior to Admission   Medication Sig Dispense Refill Last Dose   • amLODIPine-benazepril (LOTREL 5-10) 5-10 MG per capsule Take 1 capsule by mouth Every Night.      • atenolol (TENORMIN) 25 MG tablet Take 13 mg by mouth Daily.      • diclofenac sodium (VOTAREN XR) 100 MG 24 hr tablet Take 75 mg by mouth 2 (Two) Times a Day.      • escitalopram (LEXAPRO) 10 MG tablet Take 10 mg by mouth Daily.      • HYDROcodone-acetaminophen (NORCO) 5-325 MG per tablet Take 1 tablet by mouth Every 4 (Four) Hours As Needed for Severe Pain . 15 tablet 0    • losartan (COZAAR) 50 MG tablet Take 100 mg by mouth Daily.      • Multiple Vitamins-Minerals (ICAPS AREDS 2 PO) Take  by mouth 2 (Two) Times a Day.      • predniSONE (DELTASONE) 50 MG tablet Take 1 tablet by mouth Daily. 4 tablet 0      Allergies:  Aleve [naproxen]    Objective      Vital Signs  Temp:  [97.7 °F (36.5 °C)-98.8 °F (37.1 °C)] 98.8 °F (37.1 °C)  Heart Rate:  [55-69] 58  Resp:  [12-20] 18  BP: (119-163)/(70-94) 119/70    Physical Exam   Constitutional: He is oriented to person, place, and time. He appears well-developed.   HENT:   Head: Normocephalic and  atraumatic.   Mouth/Throat: Oropharynx is clear and moist.   Eyes: EOM are normal. Pupils are equal, round, and reactive to light.   Neck: Normal range of motion. Neck supple. No JVD present. No tracheal deviation present. No thyromegaly present.   Cardiovascular: Normal rate, regular rhythm, normal heart sounds and intact distal pulses. Exam reveals no gallop and no friction rub.   No murmur heard.  Pulmonary/Chest: Effort normal and breath sounds normal. No respiratory distress. He has no wheezes. He has no rales. He exhibits no tenderness.   Abdominal: Soft. He exhibits no distension. There is no tenderness.   Musculoskeletal: Normal range of motion. He exhibits no edema.   Lymphadenopathy:     He has no cervical adenopathy.   Neurological: He is alert and oriented to person, place, and time. No cranial nerve deficit.   Skin: Skin is warm and dry.   Psychiatric: He has a normal mood and affect.       Results Review:   Xr Chest 1 View    Result Date: 8/8/2019  Narrative: EXAMINATION: Chest 01/20/2019  HISTORY: Chest pain  FINDINGS: Upright frontal projection of the chest demonstrates no evidence of acute cardiopulmonary disease. There is no effusion or free air present. There is spondylosis within the mid and lower thoracic spine.      Impression: . No acute disease. This report was finalized on 08/08/2019 17:47 by Dr. Bebeto Floyd MD.       Cardiac Catheterization Operative Report        Patient was referred for cardiac catheterization .       Procedure Details     Diagnostic coronary angiography was performed with 5 [place]kin[place] JL4 and JR 4 coronary catheters.       Pigtail catheter for left ventriculography and left heart pressure measurements     Coronary angiogram were performed in [place] and [place] projection to evaluate the coronary arterial systems.          Before all coronary angiograms were obtained, a femoral angiogram was performed and the arteriotomy was assessed for suitability for a closure device.        A 6/7 Fr Mynx closure device was used to achieve hemostasis.  The patient tolerated the procedure well, and there were no immediate complications.     ____________________________________________________________________________________________________________________________________________   Selective coronary angiography:     Coronary anatomy as below  ____________________________________________________________________________________________________________________________________________  Estimated Blood Loss:  Minimal         Complications:  None; patient tolerated the procedure well.           Disposition: Cardiovascular observation unit              Condition: stable            I supervised the administration of conscious sedation by nursing staff throughout the case.       Immediately prior to the procedure the patient was re-examined and subsequently the  first dose was given at 1351 Hours  and the end of my face-to-face encounter was at 1410 Hours.          During the case, continuous pulse oximetry, heart rate, blood pressure, and patient status were monitored.         ____________________________________________________________________________________________________________________________________________     Conclusion of cardiac catheterization        Distal left main coronary artery has 95% stenosis.  Left anterior descending coronary artery arises normally and proximally has a 90% stenosis  Mid to distal left anterior descending coronary artery has a 90% stenosis  The second diagonal branch has a 90% stenosis  First diagonal branch is small.  The left circumflex coronary artery is large and distally has a 90% stenosis.  Right coronary artery is dominant with distal 90% stenosis.  50 to 60% stenosis proximally.  Distal vessel has atherosclerotic plaques.  Bilateral internal mammary arteries are patent and can be used as a conduit  Left ventricular end-diastolic pressure moderately elevated to  21 mmHg.  No gradient across aortic valve pullback  Left ventriculogram not performed given critical left main stenosis.  By echocardiogram ejection fraction is preserved        I reviewed the patient's new clinical results.  Discussed with patient and wife      Assessment/Plan       Chest pain    Left main coronary artery disease  Unstable angina        I discussed the patients findings and my recommendations with patient and wife, We discussed the options for treatment of coronary artery disease to include medical therapy, coronary stenting, and surgical revascularization.  We discussed the pros and cons of each option and how it pertains to the current case.  The STS Risk score was calculated using the STS Risk Calculator and discussed with the patient .  Coronary artery bypass grafting is best option given patient's findings and risk factors.  We discussed the operative conduct and expected hospital and outpatient recovery.  Risks were discussed to include but not limited to bleeding, infection, stroke, heart attack, need for additional procedures, anesthesia risk, organ dysfunction and/or death, prolonged mechanical ventilation, prolonged ICU stay, chronic pain syndromes, sternal nonunion, and/or death.  We discussed the need to utilize all medical treatments additionally prescribed post surgery.       The patient and wife understands the risks, benefits, and alternatives and he wishes to proceed forward with surgery.    Will proceed with urgent planned CABG 8/12/2019

## 2019-08-10 NOTE — PROGRESS NOTES
"[facility], [facility] Progress Note     LOS: 0 days   Patient Care Team:  Marialuisa Colunga MD as PCP - General (Family Medicine)    Chief Complaint:  CP    Subjective     No further CP or SOB    Review of Systems:   A 10-point review of systems is obtained and negative except for otherwise mentioned above.    Objective     Vital Sign Min/Max for last 24 hours  Temp  Min: 97.7 °F (36.5 °C)  Max: 98.8 °F (37.1 °C)   BP  Min: 119/70  Max: 163/94   Pulse  Min: 55  Max: 69   Resp  Min: 12  Max: 20   SpO2  Min: 93 %  Max: 100 %   No Data Recorded   Weight  Min: 90.3 kg (199 lb)  Max: 90.3 kg (199 lb)     Flowsheet Rows      First Filed Value   Admission Height  182.9 cm (72\") Documented at 08/08/2019 1715   Admission Weight  91.6 kg (202 lb) Documented at 08/08/2019 1715          Physical Exam:   General Appearance: alert, appears stated age and cooperative  Lungs: clear to auscultation, respirations regular, respirations even and respirations unlabored  Heart: regular rhythm & normal rate, normal S1, S2, no murmur, no gallop, no rub and no click     Results Review:     I reviewed the patient's new clinical results.    Results from last 7 days   Lab Units 08/10/19  0356   WBC 10*3/mm3 8.83   HEMOGLOBIN g/dL 12.6*   HEMATOCRIT % 35.8*   PLATELETS 10*3/mm3 234     Results from last 7 days   Lab Units 08/10/19  0356   SODIUM mmol/L 140   POTASSIUM mmol/L 4.0   CHLORIDE mmol/L 108   CO2 mmol/L 25.0   BUN mg/dL 13   CREATININE mg/dL 0.71   GLUCOSE mg/dL 86   CALCIUM mg/dL 8.5     Results from last 7 days   Lab Units 08/10/19  0356 08/08/19  1749   SODIUM mmol/L 140 140   POTASSIUM mmol/L 4.0 4.3   CHLORIDE mmol/L 108 107   CO2 mmol/L 25.0 27.0   BUN mg/dL 13 25*   CREATININE mg/dL 0.71 0.89   CALCIUM mg/dL 8.5 8.9   BILIRUBIN mg/dL  --  0.4   ALK PHOS U/L  --  89   ALT (SGPT) U/L  --  37   AST (SGOT) U/L  --  27   GLUCOSE mg/dL 86 94     Results from last 7 days   Lab Units 08/08/19  7445 "   TROPONIN I ng/mL <0.012         Results from last 7 days   Lab Units 08/10/19  0356   CHOLESTEROL mg/dL 185   TRIGLYCERIDES mg/dL 144   HDL CHOL mg/dL 23*   LDL CHOL mg/dL 149*       Medication Review: yes    Assessment/Plan       Chest pain    Left main coronary artery disease  Multivessel CAD, awaiting CABG  HTN  HL    Continue OMT.  CABG planned for Monday.    Francia Genao PA-C  08/10/19  1:10 PM

## 2019-08-11 LAB
ANION GAP SERPL CALCULATED.3IONS-SCNC: 8 MMOL/L (ref 4–13)
BUN BLD-MCNC: 15 MG/DL (ref 5–21)
BUN/CREAT SERPL: 16.3 (ref 7–25)
CALCIUM SPEC-SCNC: 8.9 MG/DL (ref 8.4–10.4)
CHLORIDE SERPL-SCNC: 104 MMOL/L (ref 98–110)
CO2 SERPL-SCNC: 27 MMOL/L (ref 24–31)
CREAT BLD-MCNC: 0.92 MG/DL (ref 0.5–1.4)
DEPRECATED RDW RBC AUTO: 43.2 FL (ref 37–54)
ERYTHROCYTE [DISTWIDTH] IN BLOOD BY AUTOMATED COUNT: 13 % (ref 12.3–15.4)
GFR SERPL CREATININE-BSD FRML MDRD: 81 ML/MIN/1.73
GLUCOSE BLD-MCNC: 110 MG/DL (ref 70–100)
HCT VFR BLD AUTO: 37.6 % (ref 37.5–51)
HGB BLD-MCNC: 13 G/DL (ref 13–17.7)
MCH RBC QN AUTO: 31.5 PG (ref 26.6–33)
MCHC RBC AUTO-ENTMCNC: 34.6 G/DL (ref 31.5–35.7)
MCV RBC AUTO: 91 FL (ref 79–97)
PLATELET # BLD AUTO: 232 10*3/MM3 (ref 140–450)
PMV BLD AUTO: 10.2 FL (ref 6–12)
POTASSIUM BLD-SCNC: 4 MMOL/L (ref 3.5–5.3)
RBC # BLD AUTO: 4.13 10*6/MM3 (ref 4.14–5.8)
SODIUM BLD-SCNC: 139 MMOL/L (ref 135–145)
WBC NRBC COR # BLD: 6.6 10*3/MM3 (ref 3.4–10.8)

## 2019-08-11 PROCEDURE — 80048 BASIC METABOLIC PNL TOTAL CA: CPT | Performed by: THORACIC SURGERY (CARDIOTHORACIC VASCULAR SURGERY)

## 2019-08-11 PROCEDURE — 99232 SBSQ HOSP IP/OBS MODERATE 35: CPT | Performed by: PHYSICIAN ASSISTANT

## 2019-08-11 PROCEDURE — 86850 RBC ANTIBODY SCREEN: CPT | Performed by: THORACIC SURGERY (CARDIOTHORACIC VASCULAR SURGERY)

## 2019-08-11 PROCEDURE — 85027 COMPLETE CBC AUTOMATED: CPT | Performed by: THORACIC SURGERY (CARDIOTHORACIC VASCULAR SURGERY)

## 2019-08-11 PROCEDURE — 25010000002 ENOXAPARIN PER 10 MG: Performed by: INTERNAL MEDICINE

## 2019-08-11 PROCEDURE — 86900 BLOOD TYPING SEROLOGIC ABO: CPT | Performed by: THORACIC SURGERY (CARDIOTHORACIC VASCULAR SURGERY)

## 2019-08-11 PROCEDURE — 86901 BLOOD TYPING SEROLOGIC RH(D): CPT | Performed by: THORACIC SURGERY (CARDIOTHORACIC VASCULAR SURGERY)

## 2019-08-11 PROCEDURE — 94760 N-INVAS EAR/PLS OXIMETRY 1: CPT

## 2019-08-11 PROCEDURE — 94799 UNLISTED PULMONARY SVC/PX: CPT

## 2019-08-11 PROCEDURE — 86920 COMPATIBILITY TEST SPIN: CPT

## 2019-08-11 PROCEDURE — 99024 POSTOP FOLLOW-UP VISIT: CPT | Performed by: THORACIC SURGERY (CARDIOTHORACIC VASCULAR SURGERY)

## 2019-08-11 RX ORDER — BUPIVACAINE HCL/0.9 % NACL/PF 0.1 %
2 PLASTIC BAG, INJECTION (ML) EPIDURAL ONCE
Status: DISCONTINUED | OUTPATIENT
Start: 2019-08-11 | End: 2019-08-11 | Stop reason: SDUPTHER

## 2019-08-11 RX ORDER — CHLORHEXIDINE GLUCONATE 0.12 MG/ML
15 RINSE ORAL ONCE
Status: DISCONTINUED | OUTPATIENT
Start: 2019-08-11 | End: 2019-08-11 | Stop reason: SDUPTHER

## 2019-08-11 RX ADMIN — METOPROLOL TARTRATE 12.5 MG: 25 TABLET, FILM COATED ORAL at 08:02

## 2019-08-11 RX ADMIN — ATORVASTATIN CALCIUM 20 MG: 10 TABLET, FILM COATED ORAL at 20:54

## 2019-08-11 RX ADMIN — AMIODARONE HYDROCHLORIDE 400 MG: 200 TABLET ORAL at 20:54

## 2019-08-11 RX ADMIN — ENOXAPARIN SODIUM 90 MG: 100 INJECTION SUBCUTANEOUS at 05:50

## 2019-08-11 RX ADMIN — AMIODARONE HYDROCHLORIDE 400 MG: 200 TABLET ORAL at 08:02

## 2019-08-11 RX ADMIN — PRENATAL VIT W/ FE FUMARATE-FA TAB 27-0.8 MG 1 TABLET: 27-0.8 TAB at 08:02

## 2019-08-11 RX ADMIN — SODIUM CHLORIDE, PRESERVATIVE FREE 3 ML: 5 INJECTION INTRAVENOUS at 20:54

## 2019-08-11 RX ADMIN — SODIUM CHLORIDE, PRESERVATIVE FREE 3 ML: 5 INJECTION INTRAVENOUS at 09:26

## 2019-08-11 RX ADMIN — ENOXAPARIN SODIUM 90 MG: 100 INJECTION SUBCUTANEOUS at 17:52

## 2019-08-11 RX ADMIN — ESCITALOPRAM 10 MG: 10 TABLET, FILM COATED ORAL at 08:02

## 2019-08-11 RX ADMIN — Medication 1 APPLICATION: at 22:43

## 2019-08-11 RX ADMIN — LOSARTAN POTASSIUM 100 MG: 50 TABLET, FILM COATED ORAL at 08:02

## 2019-08-11 RX ADMIN — ASPIRIN 81 MG: 81 TABLET, CHEWABLE ORAL at 08:02

## 2019-08-11 NOTE — PLAN OF CARE
Problem: Patient Care Overview  Goal: Plan of Care Review  Outcome: Ongoing (interventions implemented as appropriate)   08/11/19 0515   Coping/Psychosocial   Plan of Care Reviewed With patient   Plan of Care Review   Progress no change   OTHER   Outcome Summary No c/o of chest pain during shift. PO Amio held last night d/t low HR per Dr. Blackburn.  Plans for CABG on Monday. Sinus andrew 46-54, down to 43 on tele. Will continue to monitor.       Problem: Cardiac: ACS (Acute Coronary Syndrome) (Adult)  Goal: Signs and Symptoms of Listed Potential Problems Will be Absent, Minimized or Managed (Cardiac: ACS)  Outcome: Ongoing (interventions implemented as appropriate)   08/11/19 0515   Goal/Outcome Evaluation   Problems Assessed (Acute Coronary Syndrome) all   Problems Present (Acute Coronary Syn) none       Problem: Cardiac Catheterization (Diagnostic/Interventional) (Adult)  Goal: Signs and Symptoms of Listed Potential Problems Will be Absent, Minimized or Managed (Cardiac Catheterization)  Outcome: Ongoing (interventions implemented as appropriate)   08/11/19 0515   Goal/Outcome Evaluation   Problems Assessed (Cardiac Catheterization) all   Problems Present (Cardiac Cath) none

## 2019-08-11 NOTE — PROGRESS NOTES
"[facility], [facility] Progress Note     LOS: 1 day   Patient Care Team:  Marialuisa Colunga MD as PCP - General (Family Medicine)    Chief Complaint:  CP    Subjective   No CP or SOB.        Review of Systems:   A 10-point review of systems is obtained and negative except for otherwise mentioned above.    Objective     Vital Sign Min/Max for last 24 hours  Temp  Min: 98 °F (36.7 °C)  Max: 99 °F (37.2 °C)   BP  Min: 101/71  Max: 152/83   Pulse  Min: 46  Max: 61   Resp  Min: 18  Max: 20   SpO2  Min: 93 %  Max: 98 %   No Data Recorded   Weight  Min: 90.3 kg (199 lb)  Max: 90.3 kg (199 lb)     Flowsheet Rows      First Filed Value   Admission Height  182.9 cm (72\") Documented at 08/08/2019 1715   Admission Weight  91.6 kg (202 lb) Documented at 08/08/2019 1715          Physical Exam:   General Appearance: alert, appears stated age and cooperative  Lungs: clear to auscultation, respirations regular, respirations even and respirations unlabored  Heart: regular rhythm & normal rate, normal S1, S2, no murmur, no gallop, no rub and no click       Results Review:     I reviewed the patient's new clinical results.    Results from last 7 days   Lab Units 08/10/19  0356   WBC 10*3/mm3 8.83   HEMOGLOBIN g/dL 12.6*   HEMATOCRIT % 35.8*   PLATELETS 10*3/mm3 234     Results from last 7 days   Lab Units 08/10/19  0356   SODIUM mmol/L 140   POTASSIUM mmol/L 4.0   CHLORIDE mmol/L 108   CO2 mmol/L 25.0   BUN mg/dL 13   CREATININE mg/dL 0.71   GLUCOSE mg/dL 86   CALCIUM mg/dL 8.5     Results from last 7 days   Lab Units 08/10/19  0356 08/08/19  1749   SODIUM mmol/L 140 140   POTASSIUM mmol/L 4.0 4.3   CHLORIDE mmol/L 108 107   CO2 mmol/L 25.0 27.0   BUN mg/dL 13 25*   CREATININE mg/dL 0.71 0.89   CALCIUM mg/dL 8.5 8.9   BILIRUBIN mg/dL  --  0.4   ALK PHOS U/L  --  89   ALT (SGPT) U/L  --  37   AST (SGOT) U/L  --  27   GLUCOSE mg/dL 86 94     Results from last 7 days   Lab Units 08/08/19  1749   TROPONIN " I ng/mL <0.012         Results from last 7 days   Lab Units 08/10/19  0356   CHOLESTEROL mg/dL 185   TRIGLYCERIDES mg/dL 144   HDL CHOL mg/dL 23*   LDL CHOL mg/dL 149*       Medication Review: yes    Assessment/Plan       Chest pain    Left main coronary artery disease  Multivessel CAD, awaiting CABG  HTN  HL    Continue OMT.  CABG on Monday.        Francia Genao PA-C  08/11/19  11:50 AM

## 2019-08-11 NOTE — PROGRESS NOTES
"Chief Complaint   Patient presents with   • Chest Pain       No events overnight.  No chest pain.  Testing completed.    Ready for surgery.  Joined by wife.      Visit Vitals  /71 (BP Location: Left arm, Patient Position: Sitting)   Pulse 51   Temp 98.8 °F (37.1 °C) (Oral)   Resp 20   Ht 180.3 cm (71\")   Wt 90.3 kg (199 lb)   SpO2 96%   BMI 27.75 kg/m²         Intake/Output Summary (Last 24 hours) at 8/11/2019 1325  Last data filed at 8/11/2019 0900  Gross per 24 hour   Intake 960 ml   Output --   Net 960 ml       Physical Exam:    General: NAD, In good spirits, in bed  Cardiovascular: RRR, No murmur, rubs, or gallops.    Pulmonary: CTAB, No wheezing, rubs, or rales.  Abdomen: soft, Non-distended, and non-tender  Extremities: warm, GLASS  Neurologic:  No focal deficits, CN II-XII intact grossly.                        Interpretation Summary     · Left ventricular systolic function is low normal. EF 51-55%.  · Mild aortic valve stenosis is present.  · Normal size and function of the right ventricle.      Patient Hx Of Height, Weight, and Vitals     Height Weight BSA (Calculated - sq m) BMI (kg/m2) Pulse BP   180.3 cm (71\") 90.3 kg (199 lb) 2.1 sq meters 27.81 58 135/84   Reason For Exam     Chest Pain   Cardiac History     Diagnosis Date Comment Source   Hypertension   Provider   Study Description     2D echo was performed with color flow and doppler. The study is technically adequate for diagnosis.Normal sinus was the predominant rhythm observed during the procedure.   Echocardiogram Findings     Left Ventricle Left ventricular systolic function is low normal. Estimated EF appears to be in the range of 51 - 55%. Normal left ventricular cavity size and wall thickness noted. All left ventricular wall segments contract normally.   Right Ventricle Normal right ventricular cavity size, wall thickness, systolic function and septal motion noted.   Left Atrium Normal left atrial size and volume noted.   Right Atrium " Normal right atrial size noted.   Aortic Valve The aortic valve is structurally normal. The valve appears trileaflet. No aortic valve regurgitation is present. Mild aortic valve stenosis is present.   Mitral Valve The mitral valve is normal in structure. Trace mitral valve regurgitation is present. No significant mitral valve stenosis is present.   Tricuspid Valve The tricuspid valve is normal. No evidence of tricuspid valve stenosis is present. Physiologic tricuspid valve regurgitation is present. Estimated right ventricular systolic pressure from tricuspid regurgitation is normal (<35 mmHg).   Pulmonic Valve The pulmonic valve is structurally normal. There is no significant pulmonic valve stenosis present. There is no pulmonic valve regurgitation present.   Greater Vessels No dilation of the aortic root is present. No dilation of the sinuses of Valsalva is present.   Pericardium The pericardium is normal. There is no evidence of pericardial effusion.      LV Measurements     Dimensions   LVIDd 5.4 cm      IVSd 0.88 cm      FS 34.8 %      ESV(cubed) 44.4 ml      EDV(cubed) 160.1 ml      LVOT area 3.1 cm^2      LVOT diam 2 cm      Diastolic Filling   MV E max hal 69.8 cm/sec      MV A max hal 51.9 cm/sec      MV dec time 0.16 sec      MV E/A 1.3       LA Volume Index 25 mL/m2      Med Peak E' Hal 6.3 cm/sec      Lat Peak E' Hal 6.6 cm/sec      Avg E/e' ratio 10.82       Shunt Ratio   SV(LVOT) 44.9 ml       Dimensions   LVIDs 3.5 cm      LVPWd 1.1 cm      IVS/LVPW 0.82       LV Sys Vol (BSA corrected) 23.4 ml/m^2      LV Sanchez Vol (BSA corrected) 44.7 ml/m^2      LV mass(C)d 201.4 grams      EDV(MOD-sp4) 94 ml      ESV(MOD-sp4) 49.2 ml      Systolic Function   SV(MOD-sp4) 44.8 ml      SI(MOD-sp4) 21.3 ml/m^2      EF(MOD-sp4) 47.7 %         LA Measurements     LA Dimensions   LA dimension 4 cm      LA Volume Index 25 mL/m2         Aortic Valve Measurements     Stenosis   LVOT diam 2 cm      LV V1 max 63.5 cm/sec       LV V1 max PG 1.6 mmHg      LV V1 mean PG 1 mmHg      LV V1 VTI 14.3 cm      Ao pk apollo 136 cm/sec       Stenosis   Ao max PG 7.4 mmHg      Ao mean PG 4 mmHg      Ao V2 VTI 30.9 cm      CORNELIUS(I,D) 1.5 cm^2         Mitral Valve Measurements     Stenosis   MV P1/2t 53.6 msec      MVA(P1/2t) 4.1 cm^2      MV dec slope 530 cm/sec^2      MV dec time 0.16 sec          Tricuspid Valve Measurements     Regurgitation   TR max apollo 199 cm/sec      RVSP(TR) 20.8 mmHg      RAP systole 5 mmHg      PISA   TR max apollo 199 cm/sec          Greater Vessels Measurements     Ao root diam 3.8 cm      Ao root area (BSA corrected) 1.8              Impression:  Left main and three-vessel coronary artery disease  Plaque present carotid artery disease with antegrade vertebral arterial flow  Unstable angina  Previous history of chronic tobacco use remote  Adequate lung function.    PLVF    Medical decision-making/recommendations/plan:  STS risk analysis is completed and he is a mildly elevated risk operative candidate.  Risks of the CABG were reviewed in details as well as the alternatives and benefits.    All questions have been answered.  Plan urgent CABG Monday as long as he is cardiac symptoms and/or events free.  He verbalized understanding and is agreeable to proceed forward.    Verified all questions have been answered.    Ready for surgery tomorrow.

## 2019-08-12 ENCOUNTER — APPOINTMENT (OUTPATIENT)
Dept: CARDIOLOGY | Facility: HOSPITAL | Age: 74
End: 2019-08-12

## 2019-08-12 ENCOUNTER — APPOINTMENT (OUTPATIENT)
Dept: GENERAL RADIOLOGY | Facility: HOSPITAL | Age: 74
End: 2019-08-12

## 2019-08-12 ENCOUNTER — ANESTHESIA EVENT (OUTPATIENT)
Dept: PERIOP | Facility: HOSPITAL | Age: 74
End: 2019-08-12

## 2019-08-12 ENCOUNTER — ANESTHESIA (OUTPATIENT)
Dept: PERIOP | Facility: HOSPITAL | Age: 74
End: 2019-08-12

## 2019-08-12 LAB
A-A DO2: ABNORMAL MMHG
ABO GROUP BLD: NORMAL
ALBUMIN SERPL-MCNC: 3 G/DL (ref 3.5–5)
ALBUMIN SERPL-MCNC: 3.1 G/DL (ref 3.5–5)
ANION GAP SERPL CALCULATED.3IONS-SCNC: 4 MMOL/L (ref 4–13)
ANION GAP SERPL CALCULATED.3IONS-SCNC: 8 MMOL/L (ref 4–13)
APTT PPP: 39.5 SECONDS (ref 24.1–35)
ARTERIAL PATENCY WRIST A: ABNORMAL
ATMOSPHERIC PRESS: 746 MMHG
ATMOSPHERIC PRESS: 746 MMHG
ATMOSPHERIC PRESS: 747 MMHG
ATMOSPHERIC PRESS: 748 MMHG
ATMOSPHERIC PRESS: 748 MMHG
ATMOSPHERIC PRESS: 749 MMHG
ATMOSPHERIC PRESS: 750 MMHG
BASE EXCESS BLDA CALC-SCNC: -0.1 MMOL/L (ref 0–2)
BASE EXCESS BLDA CALC-SCNC: -0.6 MMOL/L (ref 0–2)
BASE EXCESS BLDA CALC-SCNC: -1.4 MMOL/L (ref 0–2)
BASE EXCESS BLDA CALC-SCNC: -2.3 MMOL/L (ref 0–2)
BASE EXCESS BLDA CALC-SCNC: -3.2 MMOL/L (ref 0–2)
BASE EXCESS BLDA CALC-SCNC: -6.2 MMOL/L (ref 0–2)
BASE EXCESS BLDA CALC-SCNC: 0.9 MMOL/L (ref 0–2)
BASE EXCESS BLDA CALC-SCNC: 1.8 MMOL/L (ref 0–2)
BASE EXCESS BLDA CALC-SCNC: 1.9 MMOL/L (ref 0–2)
BASE EXCESS BLDA CALC-SCNC: 2.5 MMOL/L (ref 0–2)
BASE EXCESS BLDV CALC-SCNC: 4.9 MMOL/L (ref 0–2)
BDY SITE: ABNORMAL
BLD GP AB SCN SERPL QL: NEGATIVE
BODY TEMPERATURE: 37 C
BUN BLD-MCNC: 15 MG/DL (ref 5–21)
BUN BLD-MCNC: 16 MG/DL (ref 5–21)
BUN/CREAT SERPL: 16.1 (ref 7–25)
BUN/CREAT SERPL: 16.7 (ref 7–25)
CA-I BLD-MCNC: 4.24 MG/DL (ref 4.6–5.4)
CA-I BLD-MCNC: 4.32 MG/DL (ref 4.6–5.4)
CA-I BLD-MCNC: 4.35 MG/DL (ref 4.6–5.4)
CA-I BLD-MCNC: 4.41 MG/DL (ref 4.6–5.4)
CA-I BLD-MCNC: 4.42 MG/DL (ref 4.6–5.4)
CA-I BLD-MCNC: 4.67 MG/DL (ref 4.6–5.4)
CA-I BLD-MCNC: 4.7 MG/DL (ref 4.6–5.4)
CA-I BLD-MCNC: 5.62 MG/DL (ref 4.6–5.4)
CALCIUM SPEC-SCNC: 10.1 MG/DL (ref 8.4–10.4)
CALCIUM SPEC-SCNC: 8.7 MG/DL (ref 8.4–10.4)
CHLORIDE SERPL-SCNC: 108 MMOL/L (ref 98–110)
CHLORIDE SERPL-SCNC: 110 MMOL/L (ref 98–110)
CO2 SERPL-SCNC: 22 MMOL/L (ref 24–31)
CO2 SERPL-SCNC: 25 MMOL/L (ref 24–31)
COHGB MFR BLD: 0.3 % (ref 0–5)
COHGB MFR BLD: 0.5 % (ref 0–5)
COHGB MFR BLD: 0.5 % (ref 0–5)
COHGB MFR BLD: 0.6 % (ref 0–5)
COHGB MFR BLD: 0.7 % (ref 0–5)
COHGB MFR BLD: 0.8 % (ref 0–5)
COHGB MFR BLD: 0.8 % (ref 0–5)
COHGB MFR BLD: 0.9 % (ref 0–5)
CREAT BLD-MCNC: 0.93 MG/DL (ref 0.5–1.4)
CREAT BLD-MCNC: 0.96 MG/DL (ref 0.5–1.4)
DEPRECATED RDW RBC AUTO: 42.9 FL (ref 37–54)
DEPRECATED RDW RBC AUTO: 45.6 FL (ref 37–54)
ERYTHROCYTE [DISTWIDTH] IN BLOOD BY AUTOMATED COUNT: 13 % (ref 12.3–15.4)
ERYTHROCYTE [DISTWIDTH] IN BLOOD BY AUTOMATED COUNT: 13.2 % (ref 12.3–15.4)
GAS FLOW AIRWAY: 1.8 LPM
GAS FLOW AIRWAY: 1.8 LPM
GAS FLOW AIRWAY: 2 LPM
GAS FLOW AIRWAY: 2.8 LPM
GAS FLOW AIRWAY: 2.8 LPM
GFR SERPL CREATININE-BSD FRML MDRD: 77 ML/MIN/1.73
GFR SERPL CREATININE-BSD FRML MDRD: 80 ML/MIN/1.73
GLUCOSE BLD-MCNC: 150 MG/DL (ref 70–100)
GLUCOSE BLD-MCNC: 212 MG/DL (ref 70–100)
GLUCOSE BLDC GLUCOMTR-MCNC: 180 MG/DL (ref 70–130)
GLUCOSE BLDC GLUCOMTR-MCNC: 187 MG/DL (ref 70–130)
GLUCOSE BLDC GLUCOMTR-MCNC: 205 MG/DL (ref 70–130)
GLUCOSE BLDC GLUCOMTR-MCNC: 216 MG/DL (ref 70–130)
GLUCOSE BLDC GLUCOMTR-MCNC: 227 MG/DL (ref 70–130)
GLUCOSE BLDC GLUCOMTR-MCNC: 234 MG/DL (ref 70–130)
GLUCOSE BLDC GLUCOMTR-MCNC: 241 MG/DL (ref 70–130)
HCO3 BLDA-SCNC: 20.4 MMOL/L (ref 20–26)
HCO3 BLDA-SCNC: 22.4 MMOL/L (ref 20–26)
HCO3 BLDA-SCNC: 22.6 MMOL/L (ref 20–26)
HCO3 BLDA-SCNC: 23.9 MMOL/L (ref 20–26)
HCO3 BLDA-SCNC: 24.8 MMOL/L (ref 20–26)
HCO3 BLDA-SCNC: 25 MMOL/L (ref 20–26)
HCO3 BLDA-SCNC: 25.4 MMOL/L (ref 20–26)
HCO3 BLDA-SCNC: 25.7 MMOL/L (ref 20–26)
HCO3 BLDA-SCNC: 25.9 MMOL/L (ref 20–26)
HCO3 BLDA-SCNC: 26 MMOL/L (ref 20–26)
HCO3 BLDV-SCNC: 29.9 MMOL/L (ref 22–28)
HCT VFR BLD AUTO: 27.9 % (ref 37.5–51)
HCT VFR BLD AUTO: 28 % (ref 37.5–51)
HCT VFR BLD CALC: 27.2 % (ref 38–51)
HCT VFR BLD CALC: 28.3 % (ref 38–51)
HCT VFR BLD CALC: 30 % (ref 38–51)
HCT VFR BLD CALC: 30.4 % (ref 38–51)
HCT VFR BLD CALC: 30.4 % (ref 38–51)
HCT VFR BLD CALC: 38.4 % (ref 38–51)
HCT VFR BLD CALC: 39.5 % (ref 38–51)
HGB BLD-MCNC: 9.5 G/DL (ref 13–17.7)
HGB BLD-MCNC: 9.7 G/DL (ref 13–17.7)
HGB BLDA-MCNC: 10.5 G/DL (ref 14–18)
HGB BLDA-MCNC: 12.5 G/DL (ref 14–18)
HGB BLDA-MCNC: 12.9 G/DL (ref 14–18)
HGB BLDA-MCNC: 8.9 G/DL (ref 14–18)
HGB BLDA-MCNC: 9.2 G/DL (ref 14–18)
HGB BLDA-MCNC: 9.8 G/DL (ref 14–18)
HGB BLDA-MCNC: 9.9 G/DL (ref 14–18)
HGB BLDA-MCNC: 9.9 G/DL (ref 14–18)
HOROWITZ INDEX BLD+IHG-RTO: 100 %
HOROWITZ INDEX BLD+IHG-RTO: 50 %
HOROWITZ INDEX BLD+IHG-RTO: 60 %
HOROWITZ INDEX BLD+IHG-RTO: 60 %
INR PPP: 1.27 (ref 0.91–1.09)
Lab: ABNORMAL
MCH RBC QN AUTO: 32 PG (ref 26.6–33)
MCH RBC QN AUTO: 32.1 PG (ref 26.6–33)
MCHC RBC AUTO-ENTMCNC: 33.9 G/DL (ref 31.5–35.7)
MCHC RBC AUTO-ENTMCNC: 34.8 G/DL (ref 31.5–35.7)
MCV RBC AUTO: 92.1 FL (ref 79–97)
MCV RBC AUTO: 94.6 FL (ref 79–97)
METHGB BLD QL: 1.2 % (ref 0–3)
METHGB BLD QL: 1.3 % (ref 0–3)
METHGB BLD QL: 1.4 % (ref 0–3)
METHGB BLD QL: 1.5 % (ref 0–3)
MODALITY: ABNORMAL
NOTE: ABNORMAL
NOTIFIED BY: ABNORMAL
NOTIFIED WHO: ABNORMAL
OXYHGB MFR BLDV: 79.7 % (ref 60–85)
OXYHGB MFR BLDV: 97.7 % (ref 94–99)
OXYHGB MFR BLDV: 97.8 % (ref 94–99)
OXYHGB MFR BLDV: 97.9 % (ref 94–99)
OXYHGB MFR BLDV: 98.1 % (ref 94–99)
OXYHGB MFR BLDV: 98.2 % (ref 94–99)
OXYHGB MFR BLDV: 98.3 % (ref 94–99)
OXYHGB MFR BLDV: 98.4 % (ref 94–99)
PCO2 BLDA: 34.8 MM HG (ref 35–45)
PCO2 BLDA: 36.1 MM HG (ref 35–45)
PCO2 BLDA: 38 MM HG (ref 35–45)
PCO2 BLDA: 38.6 MM HG (ref 35–45)
PCO2 BLDA: 39.3 MM HG (ref 35–45)
PCO2 BLDA: 41 MM HG (ref 35–45)
PCO2 BLDA: 41.4 MM HG (ref 35–45)
PCO2 BLDA: 42 MM HG (ref 35–45)
PCO2 BLDA: 43.8 MM HG (ref 35–45)
PCO2 BLDA: 43.9 MM HG (ref 35–45)
PCO2 BLDV: 45.6 MM HG (ref 41–51)
PCO2 TEMP ADJ BLD: ABNORMAL MM HG (ref 35–45)
PEEP RESPIRATORY: 5 CM[H2O]
PH BLDA: 7.28 PH UNITS (ref 7.35–7.45)
PH BLDA: 7.34 PH UNITS (ref 7.35–7.45)
PH BLDA: 7.36 PH UNITS (ref 7.35–7.45)
PH BLDA: 7.37 PH UNITS (ref 7.35–7.45)
PH BLDA: 7.38 PH UNITS (ref 7.35–7.45)
PH BLDA: 7.39 PH UNITS (ref 7.35–7.45)
PH BLDA: 7.42 PH UNITS (ref 7.35–7.45)
PH BLDA: 7.44 PH UNITS (ref 7.35–7.45)
PH BLDA: 7.46 PH UNITS (ref 7.35–7.45)
PH BLDA: 7.48 PH UNITS (ref 7.35–7.45)
PH BLDV: 7.42 PH UNITS (ref 7.32–7.42)
PH, TEMP CORRECTED: ABNORMAL PH UNITS (ref 7.35–7.45)
PHOSPHATE SERPL-MCNC: 4.1 MG/DL (ref 2.5–4.5)
PHOSPHATE SERPL-MCNC: 5.4 MG/DL (ref 2.5–4.5)
PLATELET # BLD AUTO: 158 10*3/MM3 (ref 140–450)
PLATELET # BLD AUTO: 187 10*3/MM3 (ref 140–450)
PMV BLD AUTO: 10.5 FL (ref 6–12)
PMV BLD AUTO: 10.6 FL (ref 6–12)
PO2 BLDA: 119 MM HG (ref 83–108)
PO2 BLDA: 224 MM HG (ref 83–108)
PO2 BLDA: 357 MM HG (ref 83–108)
PO2 BLDA: 446 MM HG (ref 83–108)
PO2 BLDA: 544 MM HG (ref 83–108)
PO2 BLDA: 69.4 MM HG (ref 83–108)
PO2 BLDA: 89.2 MM HG (ref 83–108)
PO2 BLDA: >547 MM HG (ref 83–108)
PO2 BLDV: 43.6 MM HG (ref 27–53)
PO2 TEMP ADJ BLD: ABNORMAL MM HG (ref 83–108)
POTASSIUM BLD-SCNC: 4.8 MMOL/L (ref 3.5–5.3)
POTASSIUM BLD-SCNC: 4.8 MMOL/L (ref 3.5–5.3)
POTASSIUM BLDA-SCNC: 4.1 MMOL/L (ref 3.5–5.2)
POTASSIUM BLDA-SCNC: 4.1 MMOL/L (ref 3.5–5.2)
POTASSIUM BLDA-SCNC: 4.6 MMOL/L (ref 3.5–5.2)
POTASSIUM BLDA-SCNC: 5.3 MMOL/L (ref 3.5–5.2)
POTASSIUM BLDA-SCNC: 5.4 MMOL/L (ref 3.5–5.2)
POTASSIUM BLDA-SCNC: 5.5 MMOL/L (ref 3.5–5.2)
POTASSIUM BLDA-SCNC: 5.6 MMOL/L (ref 3.5–5.2)
POTASSIUM BLDV-SCNC: 4.1 MMOL/L (ref 3.5–5.2)
PROTHROMBIN TIME: 16.3 SECONDS (ref 11.9–14.6)
PSV: 10 CMH2O
RBC # BLD AUTO: 2.96 10*6/MM3 (ref 4.14–5.8)
RBC # BLD AUTO: 3.03 10*6/MM3 (ref 4.14–5.8)
RH BLD: POSITIVE
SAO2 % BLDCOA: 100.1 % (ref 94–99)
SAO2 % BLDCOA: 94 % (ref 94–99)
SAO2 % BLDCOA: 97.2 % (ref 94–99)
SAO2 % BLDCOA: 98.3 % (ref 94–99)
SAO2 % BLDCOA: 99.7 % (ref 94–99)
SAO2 % BLDCOA: 99.7 % (ref 94–99)
SAO2 % BLDCOA: 99.9 % (ref 94–99)
SAO2 % BLDCOA: >100.1 % (ref 94–99)
SAO2 % BLDCOV: 81.3 % (ref 45–75)
SET MECH RESP RATE: 14
SET MECH RESP RATE: 16
SET MECH RESP RATE: 16
SODIUM BLD-SCNC: 138 MMOL/L (ref 135–145)
SODIUM BLD-SCNC: 139 MMOL/L (ref 135–145)
SODIUM BLDA-SCNC: 139 MMOL/L (ref 136–145)
SODIUM BLDA-SCNC: 140 MMOL/L (ref 136–145)
SODIUM BLDA-SCNC: 141 MMOL/L (ref 136–145)
SODIUM BLDA-SCNC: 142 MMOL/L (ref 136–145)
SODIUM BLDV-SCNC: 141 MMOL/L (ref 136–145)
T&S EXPIRATION DATE: NORMAL
VENTILATOR MODE: ABNORMAL
VT ON VENT VENT: 600 ML
VT ON VENT VENT: 650 ML
VT ON VENT VENT: 650 ML
WBC NRBC COR # BLD: 10.7 10*3/MM3 (ref 3.4–10.8)
WBC NRBC COR # BLD: 13.75 10*3/MM3 (ref 3.4–10.8)

## 2019-08-12 PROCEDURE — 94002 VENT MGMT INPAT INIT DAY: CPT

## 2019-08-12 PROCEDURE — 25010000003 CEFAZOLIN PER 500 MG: Performed by: NURSE ANESTHETIST, CERTIFIED REGISTERED

## 2019-08-12 PROCEDURE — 85730 THROMBOPLASTIN TIME PARTIAL: CPT | Performed by: THORACIC SURGERY (CARDIOTHORACIC VASCULAR SURGERY)

## 2019-08-12 PROCEDURE — 25010000002 PAPAVERINE PER 60 MG: Performed by: THORACIC SURGERY (CARDIOTHORACIC VASCULAR SURGERY)

## 2019-08-12 PROCEDURE — 25010000002 HEPARIN (PORCINE) PER 1000 UNITS: Performed by: NURSE ANESTHETIST, CERTIFIED REGISTERED

## 2019-08-12 PROCEDURE — 25010000002 CEFAZOLIN PER 500 MG: Performed by: THORACIC SURGERY (CARDIOTHORACIC VASCULAR SURGERY)

## 2019-08-12 PROCEDURE — 80069 RENAL FUNCTION PANEL: CPT | Performed by: THORACIC SURGERY (CARDIOTHORACIC VASCULAR SURGERY)

## 2019-08-12 PROCEDURE — 93312 ECHO TRANSESOPHAGEAL: CPT | Performed by: INTERNAL MEDICINE

## 2019-08-12 PROCEDURE — 25010000002 VANCOMYCIN 1 G RECONSTITUTED SOLUTION 1 EACH VIAL: Performed by: THORACIC SURGERY (CARDIOTHORACIC VASCULAR SURGERY)

## 2019-08-12 PROCEDURE — 82805 BLOOD GASES W/O2 SATURATION: CPT

## 2019-08-12 PROCEDURE — 85610 PROTHROMBIN TIME: CPT | Performed by: THORACIC SURGERY (CARDIOTHORACIC VASCULAR SURGERY)

## 2019-08-12 PROCEDURE — 25810000003 DEXTROSE 5 % WITH KCL 20 MEQ 20-5 MEQ/L-% SOLUTION: Performed by: THORACIC SURGERY (CARDIOTHORACIC VASCULAR SURGERY)

## 2019-08-12 PROCEDURE — 33521 CABG ARTERY-VEIN FOUR: CPT | Performed by: THORACIC SURGERY (CARDIOTHORACIC VASCULAR SURGERY)

## 2019-08-12 PROCEDURE — 25010000002 MIDAZOLAM PER 1 MG: Performed by: NURSE ANESTHETIST, CERTIFIED REGISTERED

## 2019-08-12 PROCEDURE — 25010000002 DEXAMETHASONE PER 1 MG: Performed by: NURSE ANESTHETIST, CERTIFIED REGISTERED

## 2019-08-12 PROCEDURE — 33508 ENDOSCOPIC VEIN HARVEST: CPT | Performed by: THORACIC SURGERY (CARDIOTHORACIC VASCULAR SURGERY)

## 2019-08-12 PROCEDURE — A4648 IMPLANTABLE TISSUE MARKER: HCPCS | Performed by: THORACIC SURGERY (CARDIOTHORACIC VASCULAR SURGERY)

## 2019-08-12 PROCEDURE — 82375 ASSAY CARBOXYHB QUANT: CPT

## 2019-08-12 PROCEDURE — 82962 GLUCOSE BLOOD TEST: CPT

## 2019-08-12 PROCEDURE — B24BZZ4 ULTRASONOGRAPHY OF HEART WITH AORTA, TRANSESOPHAGEAL: ICD-10-PCS | Performed by: THORACIC SURGERY (CARDIOTHORACIC VASCULAR SURGERY)

## 2019-08-12 PROCEDURE — 82330 ASSAY OF CALCIUM: CPT

## 2019-08-12 PROCEDURE — 5A1221Z PERFORMANCE OF CARDIAC OUTPUT, CONTINUOUS: ICD-10-PCS | Performed by: THORACIC SURGERY (CARDIOTHORACIC VASCULAR SURGERY)

## 2019-08-12 PROCEDURE — 93325 DOPPLER ECHO COLOR FLOW MAPG: CPT | Performed by: INTERNAL MEDICINE

## 2019-08-12 PROCEDURE — 85027 COMPLETE CBC AUTOMATED: CPT | Performed by: THORACIC SURGERY (CARDIOTHORACIC VASCULAR SURGERY)

## 2019-08-12 PROCEDURE — C1751 CATH, INF, PER/CENT/MIDLINE: HCPCS | Performed by: NURSE ANESTHETIST, CERTIFIED REGISTERED

## 2019-08-12 PROCEDURE — 25010000002 HEPARIN (PORCINE) PER 1000 UNITS: Performed by: THORACIC SURGERY (CARDIOTHORACIC VASCULAR SURGERY)

## 2019-08-12 PROCEDURE — 83050 HGB METHEMOGLOBIN QUAN: CPT

## 2019-08-12 PROCEDURE — 94799 UNLISTED PULMONARY SVC/PX: CPT

## 2019-08-12 PROCEDURE — 021309W BYPASS CORONARY ARTERY, FOUR OR MORE ARTERIES FROM AORTA WITH AUTOLOGOUS VENOUS TISSUE, OPEN APPROACH: ICD-10-PCS | Performed by: THORACIC SURGERY (CARDIOTHORACIC VASCULAR SURGERY)

## 2019-08-12 PROCEDURE — 94770: CPT

## 2019-08-12 PROCEDURE — 02100Z9 BYPASS CORONARY ARTERY, ONE ARTERY FROM LEFT INTERNAL MAMMARY, OPEN APPROACH: ICD-10-PCS | Performed by: THORACIC SURGERY (CARDIOTHORACIC VASCULAR SURGERY)

## 2019-08-12 PROCEDURE — 93318 ECHO TRANSESOPHAGEAL INTRAOP: CPT

## 2019-08-12 PROCEDURE — 71045 X-RAY EXAM CHEST 1 VIEW: CPT

## 2019-08-12 PROCEDURE — 93355 ECHO TRANSESOPHAGEAL (TEE): CPT

## 2019-08-12 PROCEDURE — 93005 ELECTROCARDIOGRAM TRACING: CPT | Performed by: THORACIC SURGERY (CARDIOTHORACIC VASCULAR SURGERY)

## 2019-08-12 PROCEDURE — 33533 CABG ARTERIAL SINGLE: CPT | Performed by: THORACIC SURGERY (CARDIOTHORACIC VASCULAR SURGERY)

## 2019-08-12 PROCEDURE — 06BP0ZZ EXCISION OF RIGHT SAPHENOUS VEIN, OPEN APPROACH: ICD-10-PCS | Performed by: THORACIC SURGERY (CARDIOTHORACIC VASCULAR SURGERY)

## 2019-08-12 PROCEDURE — C1713 ANCHOR/SCREW BN/BN,TIS/BN: HCPCS | Performed by: THORACIC SURGERY (CARDIOTHORACIC VASCULAR SURGERY)

## 2019-08-12 PROCEDURE — 25010000002 PROTAMINE SULFATE PER 10 MG: Performed by: NURSE ANESTHETIST, CERTIFIED REGISTERED

## 2019-08-12 PROCEDURE — 82820 HEMOGLOBIN-OXYGEN AFFINITY: CPT

## 2019-08-12 PROCEDURE — 25010000002 PHENYLEPHRINE PER 1 ML: Performed by: NURSE ANESTHETIST, CERTIFIED REGISTERED

## 2019-08-12 PROCEDURE — 82803 BLOOD GASES ANY COMBINATION: CPT

## 2019-08-12 PROCEDURE — 93010 ELECTROCARDIOGRAM REPORT: CPT | Performed by: INTERNAL MEDICINE

## 2019-08-12 PROCEDURE — 25010000002 PHENYLEPHRINE 10 MG/ML SOLUTION 5 ML VIAL: Performed by: THORACIC SURGERY (CARDIOTHORACIC VASCULAR SURGERY)

## 2019-08-12 PROCEDURE — 25010000002 SUCCINYLCHOLINE PER 20 MG: Performed by: NURSE ANESTHETIST, CERTIFIED REGISTERED

## 2019-08-12 DEVICE — DISK-SHAPED STYLE, SILICONE (1 PER STERILE PKG)
Type: IMPLANTABLE DEVICE | Site: HEART | Status: FUNCTIONAL
Brand: SCANLAN® RADIOMARK® GRAFT MARKERS

## 2019-08-12 RX ORDER — AMINOCAPROIC ACID 250 MG/ML
INJECTION, SOLUTION INTRAVENOUS AS NEEDED
Status: DISCONTINUED | OUTPATIENT
Start: 2019-08-12 | End: 2019-08-12 | Stop reason: SURG

## 2019-08-12 RX ORDER — MEPERIDINE HYDROCHLORIDE 50 MG/ML
25 INJECTION INTRAMUSCULAR; INTRAVENOUS; SUBCUTANEOUS
Status: DISCONTINUED | OUTPATIENT
Start: 2019-08-12 | End: 2019-08-13

## 2019-08-12 RX ORDER — ASPIRIN 81 MG/1
162 TABLET, CHEWABLE ORAL ONCE
Status: COMPLETED | OUTPATIENT
Start: 2019-08-13 | End: 2019-08-13

## 2019-08-12 RX ORDER — IPRATROPIUM BROMIDE AND ALBUTEROL SULFATE 2.5; .5 MG/3ML; MG/3ML
3 SOLUTION RESPIRATORY (INHALATION)
Status: DISCONTINUED | OUTPATIENT
Start: 2019-08-12 | End: 2019-08-12

## 2019-08-12 RX ORDER — POTASSIUM CHLORIDE, DEXTROSE MONOHYDRATE 150; 5 MG/100ML; G/100ML
30 INJECTION, SOLUTION INTRAVENOUS CONTINUOUS
Status: DISCONTINUED | OUTPATIENT
Start: 2019-08-12 | End: 2019-08-15

## 2019-08-12 RX ORDER — HEPARIN SODIUM 1000 [USP'U]/ML
INJECTION, SOLUTION INTRAVENOUS; SUBCUTANEOUS AS NEEDED
Status: DISCONTINUED | OUTPATIENT
Start: 2019-08-12 | End: 2019-08-12 | Stop reason: SURG

## 2019-08-12 RX ORDER — POTASSIUM CHLORIDE 29.8 MG/ML
20 INJECTION INTRAVENOUS
Status: DISCONTINUED | OUTPATIENT
Start: 2019-08-12 | End: 2019-08-15

## 2019-08-12 RX ORDER — SODIUM CHLORIDE 9 MG/ML
INJECTION, SOLUTION INTRAVENOUS AS NEEDED
Status: DISCONTINUED | OUTPATIENT
Start: 2019-08-12 | End: 2019-08-12 | Stop reason: HOSPADM

## 2019-08-12 RX ORDER — ROCURONIUM BROMIDE 10 MG/ML
INJECTION, SOLUTION INTRAVENOUS AS NEEDED
Status: DISCONTINUED | OUTPATIENT
Start: 2019-08-12 | End: 2019-08-12 | Stop reason: SURG

## 2019-08-12 RX ORDER — SODIUM CHLORIDE, SODIUM LACTATE, POTASSIUM CHLORIDE, CALCIUM CHLORIDE 600; 310; 30; 20 MG/100ML; MG/100ML; MG/100ML; MG/100ML
INJECTION, SOLUTION INTRAVENOUS CONTINUOUS PRN
Status: DISCONTINUED | OUTPATIENT
Start: 2019-08-12 | End: 2019-08-12 | Stop reason: SURG

## 2019-08-12 RX ORDER — SUCCINYLCHOLINE CHLORIDE 20 MG/ML
INJECTION INTRAMUSCULAR; INTRAVENOUS AS NEEDED
Status: DISCONTINUED | OUTPATIENT
Start: 2019-08-12 | End: 2019-08-12 | Stop reason: SURG

## 2019-08-12 RX ORDER — DEXTROSE MONOHYDRATE 25 G/50ML
25-50 INJECTION, SOLUTION INTRAVENOUS
Status: DISCONTINUED | OUTPATIENT
Start: 2019-08-12 | End: 2019-08-15

## 2019-08-12 RX ORDER — OXYCODONE AND ACETAMINOPHEN 10; 325 MG/1; MG/1
1 TABLET ORAL
Status: DISCONTINUED | OUTPATIENT
Start: 2019-08-12 | End: 2019-08-16

## 2019-08-12 RX ORDER — ASPIRIN 81 MG/1
81 TABLET ORAL DAILY
Status: DISCONTINUED | OUTPATIENT
Start: 2019-08-14 | End: 2019-08-19 | Stop reason: HOSPADM

## 2019-08-12 RX ORDER — ONDANSETRON 2 MG/ML
4 INJECTION INTRAMUSCULAR; INTRAVENOUS EVERY 6 HOURS PRN
Status: DISCONTINUED | OUTPATIENT
Start: 2019-08-12 | End: 2019-08-19 | Stop reason: HOSPADM

## 2019-08-12 RX ORDER — METOPROLOL TARTRATE 5 MG/5ML
INJECTION INTRAVENOUS AS NEEDED
Status: DISCONTINUED | OUTPATIENT
Start: 2019-08-12 | End: 2019-08-12 | Stop reason: SURG

## 2019-08-12 RX ORDER — BUPIVACAINE HCL/0.9 % NACL/PF 0.1 %
2 PLASTIC BAG, INJECTION (ML) EPIDURAL ONCE
Status: COMPLETED | OUTPATIENT
Start: 2019-08-12 | End: 2019-08-12

## 2019-08-12 RX ORDER — OXYCODONE HYDROCHLORIDE AND ACETAMINOPHEN 5; 325 MG/1; MG/1
1 TABLET ORAL
Status: DISCONTINUED | OUTPATIENT
Start: 2019-08-12 | End: 2019-08-19 | Stop reason: HOSPADM

## 2019-08-12 RX ORDER — CLOPIDOGREL BISULFATE 75 MG/1
75 TABLET ORAL DAILY
Status: DISCONTINUED | OUTPATIENT
Start: 2019-08-13 | End: 2019-08-19 | Stop reason: HOSPADM

## 2019-08-12 RX ORDER — ETOMIDATE 2 MG/ML
INJECTION INTRAVENOUS AS NEEDED
Status: DISCONTINUED | OUTPATIENT
Start: 2019-08-12 | End: 2019-08-12 | Stop reason: SURG

## 2019-08-12 RX ORDER — DEXAMETHASONE SODIUM PHOSPHATE 4 MG/ML
INJECTION, SOLUTION INTRA-ARTICULAR; INTRALESIONAL; INTRAMUSCULAR; INTRAVENOUS; SOFT TISSUE AS NEEDED
Status: DISCONTINUED | OUTPATIENT
Start: 2019-08-12 | End: 2019-08-12 | Stop reason: SURG

## 2019-08-12 RX ORDER — LIDOCAINE HYDROCHLORIDE 20 MG/ML
INJECTION, SOLUTION INFILTRATION; PERINEURAL AS NEEDED
Status: DISCONTINUED | OUTPATIENT
Start: 2019-08-12 | End: 2019-08-12 | Stop reason: SURG

## 2019-08-12 RX ORDER — VECURONIUM BROMIDE 1 MG/ML
INJECTION, POWDER, LYOPHILIZED, FOR SOLUTION INTRAVENOUS AS NEEDED
Status: DISCONTINUED | OUTPATIENT
Start: 2019-08-12 | End: 2019-08-12 | Stop reason: SURG

## 2019-08-12 RX ORDER — MORPHINE SULFATE 2 MG/ML
2 INJECTION, SOLUTION INTRAMUSCULAR; INTRAVENOUS
Status: DISCONTINUED | OUTPATIENT
Start: 2019-08-12 | End: 2019-08-15

## 2019-08-12 RX ORDER — MAGNESIUM HYDROXIDE 1200 MG/15ML
LIQUID ORAL AS NEEDED
Status: DISCONTINUED | OUTPATIENT
Start: 2019-08-12 | End: 2019-08-12 | Stop reason: HOSPADM

## 2019-08-12 RX ORDER — CHLORHEXIDINE GLUCONATE 0.12 MG/ML
15 RINSE ORAL ONCE
Status: COMPLETED | OUTPATIENT
Start: 2019-08-12 | End: 2019-08-12

## 2019-08-12 RX ORDER — PHENYLEPHRINE HCL IN 0.9% NACL 0.8MG/10ML
SYRINGE (ML) INTRAVENOUS AS NEEDED
Status: DISCONTINUED | OUTPATIENT
Start: 2019-08-12 | End: 2019-08-12 | Stop reason: SURG

## 2019-08-12 RX ORDER — IPRATROPIUM BROMIDE AND ALBUTEROL SULFATE 2.5; .5 MG/3ML; MG/3ML
3 SOLUTION RESPIRATORY (INHALATION)
Status: DISCONTINUED | OUTPATIENT
Start: 2019-08-12 | End: 2019-08-15

## 2019-08-12 RX ORDER — CEFAZOLIN SODIUM 1 G/3ML
INJECTION, POWDER, FOR SOLUTION INTRAMUSCULAR; INTRAVENOUS AS NEEDED
Status: DISCONTINUED | OUTPATIENT
Start: 2019-08-12 | End: 2019-08-12 | Stop reason: SURG

## 2019-08-12 RX ORDER — AMIODARONE HYDROCHLORIDE 200 MG/1
400 TABLET ORAL EVERY 12 HOURS SCHEDULED
Status: DISCONTINUED | OUTPATIENT
Start: 2019-08-12 | End: 2019-08-19 | Stop reason: HOSPADM

## 2019-08-12 RX ORDER — CHLORHEXIDINE GLUCONATE 0.12 MG/ML
15 RINSE ORAL EVERY 12 HOURS
Status: DISCONTINUED | OUTPATIENT
Start: 2019-08-12 | End: 2019-08-15

## 2019-08-12 RX ORDER — BISACODYL 5 MG/1
10 TABLET, DELAYED RELEASE ORAL 2 TIMES DAILY
Status: DISCONTINUED | OUTPATIENT
Start: 2019-08-12 | End: 2019-08-19 | Stop reason: HOSPADM

## 2019-08-12 RX ORDER — NITROGLYCERIN 20 MG/100ML
5 INJECTION INTRAVENOUS CONTINUOUS
Status: DISCONTINUED | OUTPATIENT
Start: 2019-08-12 | End: 2019-08-13

## 2019-08-12 RX ORDER — BUPIVACAINE HCL/0.9 % NACL/PF 0.1 %
2 PLASTIC BAG, INJECTION (ML) EPIDURAL EVERY 8 HOURS
Status: DISPENSED | OUTPATIENT
Start: 2019-08-12 | End: 2019-08-14

## 2019-08-12 RX ORDER — MIDAZOLAM HYDROCHLORIDE 1 MG/ML
INJECTION INTRAMUSCULAR; INTRAVENOUS AS NEEDED
Status: DISCONTINUED | OUTPATIENT
Start: 2019-08-12 | End: 2019-08-12 | Stop reason: SURG

## 2019-08-12 RX ORDER — NITROGLYCERIN 20 MG/100ML
INJECTION INTRAVENOUS CONTINUOUS PRN
Status: DISCONTINUED | OUTPATIENT
Start: 2019-08-12 | End: 2019-08-12 | Stop reason: SURG

## 2019-08-12 RX ORDER — SODIUM CHLORIDE 9 MG/ML
INJECTION, SOLUTION INTRAVENOUS CONTINUOUS PRN
Status: DISCONTINUED | OUTPATIENT
Start: 2019-08-12 | End: 2019-08-12 | Stop reason: SURG

## 2019-08-12 RX ORDER — ATORVASTATIN CALCIUM 10 MG/1
20 TABLET, FILM COATED ORAL NIGHTLY
Status: DISCONTINUED | OUTPATIENT
Start: 2019-08-13 | End: 2019-08-19 | Stop reason: HOSPADM

## 2019-08-12 RX ORDER — SUFENTANIL CITRATE 50 UG/ML
INJECTION EPIDURAL; INTRAVENOUS AS NEEDED
Status: DISCONTINUED | OUTPATIENT
Start: 2019-08-12 | End: 2019-08-12 | Stop reason: SURG

## 2019-08-12 RX ORDER — CALCIUM CHLORIDE 100 MG/ML
INJECTION INTRAVENOUS; INTRAVENTRICULAR AS NEEDED
Status: DISCONTINUED | OUTPATIENT
Start: 2019-08-12 | End: 2019-08-12 | Stop reason: SURG

## 2019-08-12 RX ORDER — PROTAMINE SULFATE 10 MG/ML
INJECTION, SOLUTION INTRAVENOUS AS NEEDED
Status: DISCONTINUED | OUTPATIENT
Start: 2019-08-12 | End: 2019-08-12 | Stop reason: SURG

## 2019-08-12 RX ORDER — NICARDIPINE HYDROCHLORIDE 2.5 MG/ML
INJECTION INTRAVENOUS AS NEEDED
Status: DISCONTINUED | OUTPATIENT
Start: 2019-08-12 | End: 2019-08-12

## 2019-08-12 RX ADMIN — DEXAMETHASONE SODIUM PHOSPHATE 12 MG: 4 INJECTION, SOLUTION INTRAMUSCULAR; INTRAVENOUS at 08:58

## 2019-08-12 RX ADMIN — NITROGLYCERIN 16.67 MCG/MIN: 20 INJECTION INTRAVENOUS at 13:46

## 2019-08-12 RX ADMIN — SODIUM CHLORIDE 5 MG/HR: 9 INJECTION, SOLUTION INTRAVENOUS at 16:30

## 2019-08-12 RX ADMIN — SODIUM CHLORIDE 2 G: 9 INJECTION, SOLUTION INTRAVENOUS at 19:45

## 2019-08-12 RX ADMIN — DEXTROSE MONOHYDRATE AND POTASSIUM CHLORIDE 30 ML/HR: 5; .149 INJECTION, SOLUTION INTRAVENOUS at 16:19

## 2019-08-12 RX ADMIN — NITROGLYCERIN 40 MCG: 20 INJECTION INTRAVENOUS at 14:44

## 2019-08-12 RX ADMIN — VECURONIUM BROMIDE 5 MG: 1 INJECTION, POWDER, LYOPHILIZED, FOR SOLUTION INTRAVENOUS at 14:28

## 2019-08-12 RX ADMIN — Medication 50 MEQ: at 22:57

## 2019-08-12 RX ADMIN — CALCIUM CHLORIDE 200 MG: 100 INJECTION, SOLUTION INTRAVENOUS; INTRAVENTRICULAR at 15:07

## 2019-08-12 RX ADMIN — SODIUM CHLORIDE, POTASSIUM CHLORIDE, SODIUM LACTATE AND CALCIUM CHLORIDE: 600; 310; 30; 20 INJECTION, SOLUTION INTRAVENOUS at 07:08

## 2019-08-12 RX ADMIN — CHLORHEXIDINE GLUCONATE 15 ML: 1.2 RINSE ORAL at 19:45

## 2019-08-12 RX ADMIN — MEPERIDINE HYDROCHLORIDE 25 MG: 50 INJECTION INTRAMUSCULAR; INTRAVENOUS; SUBCUTANEOUS at 16:47

## 2019-08-12 RX ADMIN — CALCIUM CHLORIDE 200 MG: 100 INJECTION, SOLUTION INTRAVENOUS; INTRAVENTRICULAR at 14:57

## 2019-08-12 RX ADMIN — SODIUM CHLORIDE, POTASSIUM CHLORIDE, SODIUM LACTATE AND CALCIUM CHLORIDE 1000 ML: 600; 310; 30; 20 INJECTION, SOLUTION INTRAVENOUS at 19:16

## 2019-08-12 RX ADMIN — PHENYLEPHRINE HYDROCHLORIDE 0.5 MCG/KG/MIN: 10 INJECTION INTRAVENOUS at 19:26

## 2019-08-12 RX ADMIN — MIDAZOLAM HYDROCHLORIDE 2 MG: 1 INJECTION, SOLUTION INTRAMUSCULAR; INTRAVENOUS at 07:12

## 2019-08-12 RX ADMIN — SODIUM CHLORIDE, POTASSIUM CHLORIDE, SODIUM LACTATE AND CALCIUM CHLORIDE: 600; 310; 30; 20 INJECTION, SOLUTION INTRAVENOUS at 08:03

## 2019-08-12 RX ADMIN — SUFENTANIL CITRATE 50 MCG: 50 INJECTION, SOLUTION EPIDURAL; INTRAVENOUS at 14:43

## 2019-08-12 RX ADMIN — SUFENTANIL CITRATE 100 MCG: 50 INJECTION, SOLUTION EPIDURAL; INTRAVENOUS at 07:23

## 2019-08-12 RX ADMIN — SODIUM CHLORIDE 2 UNITS/HR: 9 INJECTION, SOLUTION INTRAVENOUS at 18:00

## 2019-08-12 RX ADMIN — CEFAZOLIN 2 G: 1 INJECTION, POWDER, FOR SOLUTION INTRAVENOUS at 12:28

## 2019-08-12 RX ADMIN — CALCIUM CHLORIDE 200 MG: 100 INJECTION, SOLUTION INTRAVENOUS; INTRAVENTRICULAR at 15:04

## 2019-08-12 RX ADMIN — MIDAZOLAM HYDROCHLORIDE 1 MG: 1 INJECTION, SOLUTION INTRAMUSCULAR; INTRAVENOUS at 07:15

## 2019-08-12 RX ADMIN — VECURONIUM BROMIDE 5 MG: 1 INJECTION, POWDER, LYOPHILIZED, FOR SOLUTION INTRAVENOUS at 10:32

## 2019-08-12 RX ADMIN — SODIUM CHLORIDE: 9 INJECTION, SOLUTION INTRAVENOUS at 10:12

## 2019-08-12 RX ADMIN — SUFENTANIL CITRATE 1 MCG/KG/HR: 0.05 INJECTION EPIDURAL; INTRAVENOUS at 07:58

## 2019-08-12 RX ADMIN — Medication 80 MCG: at 07:26

## 2019-08-12 RX ADMIN — SUCCINYLCHOLINE CHLORIDE 200 MG: 20 INJECTION, SOLUTION INTRAMUSCULAR; INTRAVENOUS at 07:23

## 2019-08-12 RX ADMIN — MIDAZOLAM HYDROCHLORIDE 2 MG: 1 INJECTION, SOLUTION INTRAMUSCULAR; INTRAVENOUS at 07:09

## 2019-08-12 RX ADMIN — PROTAMINE SULFATE 250 MG: 10 INJECTION, SOLUTION INTRAVENOUS at 13:45

## 2019-08-12 RX ADMIN — Medication 40 MCG: at 15:09

## 2019-08-12 RX ADMIN — CEFAZOLIN 2 G: 1 INJECTION, POWDER, FOR SOLUTION INTRAVENOUS at 08:29

## 2019-08-12 RX ADMIN — ETOMIDATE 16 MG: 2 INJECTION, SOLUTION INTRAVENOUS at 07:23

## 2019-08-12 RX ADMIN — Medication 1 APPLICATION: at 06:04

## 2019-08-12 RX ADMIN — VECURONIUM BROMIDE 5 MG: 1 INJECTION, POWDER, LYOPHILIZED, FOR SOLUTION INTRAVENOUS at 09:30

## 2019-08-12 RX ADMIN — IPRATROPIUM BROMIDE AND ALBUTEROL SULFATE 3 ML: 2.5; .5 SOLUTION RESPIRATORY (INHALATION) at 20:18

## 2019-08-12 RX ADMIN — VECURONIUM BROMIDE 5 MG: 1 INJECTION, POWDER, LYOPHILIZED, FOR SOLUTION INTRAVENOUS at 15:04

## 2019-08-12 RX ADMIN — ROCURONIUM BROMIDE 5 MG: 10 INJECTION INTRAVENOUS at 07:23

## 2019-08-12 RX ADMIN — METOPROLOL TARTRATE 1 MG: 5 INJECTION INTRAVENOUS at 08:00

## 2019-08-12 RX ADMIN — PHENYLEPHRINE HYDROCHLORIDE 1 MCG/KG/MIN: 10 INJECTION INTRAVENOUS at 14:28

## 2019-08-12 RX ADMIN — AMINOCAPROIC ACID 1 G/HR: 250 INJECTION, SOLUTION INTRAVENOUS at 07:50

## 2019-08-12 RX ADMIN — IPRATROPIUM BROMIDE AND ALBUTEROL SULFATE 3 ML: 2.5; .5 SOLUTION RESPIRATORY (INHALATION) at 15:54

## 2019-08-12 RX ADMIN — VECURONIUM BROMIDE 5 MG: 1 INJECTION, POWDER, LYOPHILIZED, FOR SOLUTION INTRAVENOUS at 13:22

## 2019-08-12 RX ADMIN — MEPERIDINE HYDROCHLORIDE 25 MG: 50 INJECTION INTRAMUSCULAR; INTRAVENOUS; SUBCUTANEOUS at 22:02

## 2019-08-12 RX ADMIN — HEPARIN SODIUM 3000 UNITS: 1000 INJECTION, SOLUTION INTRAVENOUS; SUBCUTANEOUS at 08:31

## 2019-08-12 RX ADMIN — AMINOCAPROIC ACID 3 G: 250 INJECTION, SOLUTION INTRAVENOUS at 07:56

## 2019-08-12 RX ADMIN — CALCIUM CHLORIDE 200 MG: 100 INJECTION, SOLUTION INTRAVENOUS; INTRAVENTRICULAR at 14:59

## 2019-08-12 RX ADMIN — LIDOCAINE HYDROCHLORIDE 100 MG: 20 INJECTION, SOLUTION INFILTRATION; PERINEURAL at 07:23

## 2019-08-12 RX ADMIN — VECURONIUM BROMIDE 10 MG: 1 INJECTION, POWDER, LYOPHILIZED, FOR SOLUTION INTRAVENOUS at 07:33

## 2019-08-12 RX ADMIN — Medication 1 APPLICATION: at 17:22

## 2019-08-12 RX ADMIN — CALCIUM CHLORIDE 200 MG: 100 INJECTION, SOLUTION INTRAVENOUS; INTRAVENTRICULAR at 15:08

## 2019-08-12 RX ADMIN — MEPERIDINE HYDROCHLORIDE 25 MG: 50 INJECTION INTRAMUSCULAR; INTRAVENOUS; SUBCUTANEOUS at 20:51

## 2019-08-12 RX ADMIN — HEPARIN SODIUM 40000 UNITS: 1000 INJECTION, SOLUTION INTRAVENOUS; SUBCUTANEOUS at 09:45

## 2019-08-12 RX ADMIN — MIDAZOLAM HYDROCHLORIDE 5 MG: 1 INJECTION, SOLUTION INTRAMUSCULAR; INTRAVENOUS at 13:22

## 2019-08-12 NOTE — ANESTHESIA PROCEDURE NOTES
Procedure Performed: Emergent/Open-Heart Anesthesia BETTY       Start Time:  8/12/2019 7:47 AM       End Time:   8/12/2019 7:57 AM    Preanesthesia Checklist:  Patient identified, IV assessed, risks and benefits discussed, monitors and equipment assessed, procedure being performed at surgeon's request and anesthesia consent obtained.    General Procedure Information  BETTY Placed for monitoring purposes only -- This is not a diagnostic BETTY  Diagnostic Indications for Echo:  assessment of surgical repair and hemodynamic monitoring  Physician Requesting Echo: Parveen Blackburn MD  Location performed:  OR  Intubated  Bite block placed  Heart visualized  Probe Insertion:  Difficult  Probe Type:  Multiplane  Modalities:  2D only, color flow mapping, continuous wave Doppler and pulse wave Doppler        Anesthesia Information  Anesthesiologist:  Luis Alfredo Rossi MD      Echocardiogram Comments:       Initial attempt was met with resistance.  Probe placed under direct view with Glidescope.

## 2019-08-12 NOTE — ANESTHESIA PROCEDURE NOTES
Arterial Line      Patient location during procedure: OR   Line placed for hemodynamic monitoring.  Performed By   CRNA: Marie Richmond CRNA  Preanesthetic Checklist  Completed: patient identified, site marked, surgical consent, pre-op evaluation, timeout performed, IV checked, risks and benefits discussed and monitors and equipment checked  Arterial Line Prep   Sterile Tech: gloves  Prep: ChloraPrep  Patient monitoring: continuous pulse oximetry  Arterial Line Procedure   Laterality:left  Location:  radial artery  Catheter size: 20 G   Guidance: palpation technique  Number of attempts: 1  Successful placement: yes  Post Assessment   Dressing Type: occlusive dressing applied.   Complications no  Circ/Move/Sens Assessment: unchanged.   Patient Tolerance: patient tolerated the procedure well with no apparent complications  Additional Notes  Placed by TORREY without difficulty

## 2019-08-12 NOTE — ANESTHESIA PROCEDURE NOTES
Peripheral IV    Patient location during procedure: OR  Performed By   CRNA: Marie Richmond CRNA  Preanesthetic Checklist  Completed: patient identified, site marked, surgical consent, pre-op evaluation, timeout performed, IV checked, risks and benefits discussed and monitors and equipment checked  Peripheral IV Prep   Prep: ChloraPrep  Peripheral IV Procedure   Laterality:left  Location:  Antecubital  Catheter size: 18 G         Post Assessment   Dressing Type: transparent and tape.    IV Dressing/Site: clean, dry and intact

## 2019-08-12 NOTE — ANESTHESIA PROCEDURE NOTES
Airway  Urgency: elective    Airway not difficult    General Information and Staff    Patient location during procedure: OR  CRNA: Marie Richmond CRNA    Indications and Patient Condition  Indications for airway management: airway protection    Preoxygenated: yes  Mask difficulty assessment: 2 - vent by mask + OA or adjuvant +/- NMBA    Final Airway Details  Final airway type: endotracheal airway      Successful airway: ETT  Cuffed: yes   Successful intubation technique: video laryngoscopy  Intubation device: Glidescope stylet.  Endotracheal tube insertion site: oral  Blade: Mae  Blade size: 4  ETT size (mm): 8.0  Cormack-Lehane Classification: grade IIa - partial view of glottis  Placement verified by: chest auscultation and capnometry   Cuff volume (mL): 8  Measured from: lips  ETT to lips (cm): 24  Number of attempts at approach: 1    Additional Comments  Atraumatic intubation with glidescope used initially d/t patient history of severe sore throat following previous surgery. Anterior approach through VC

## 2019-08-12 NOTE — PLAN OF CARE
Problem: Patient Care Overview  Goal: Plan of Care Review  Outcome: Ongoing (interventions implemented as appropriate)   08/11/19 1923   Coping/Psychosocial   Plan of Care Reviewed With patient   Plan of Care Review   Progress no change   OTHER   Outcome Summary Patient has had no complaints of chest pain today. CABG planned for AM with Dr. Vanessa morales on tele 52-64.        Problem: Cardiac Catheterization (Diagnostic/Interventional) (Adult)  Goal: Signs and Symptoms of Listed Potential Problems Will be Absent, Minimized or Managed (Cardiac Catheterization)  Outcome: Outcome(s) achieved Date Met: 08/11/19

## 2019-08-12 NOTE — ANESTHESIA POSTPROCEDURE EVALUATION
"Patient: Piter Ro    Procedure Summary     Date:  08/12/19 Room / Location:   [facility] OR  /  [facility] OR    Anesthesia Start:  0700 Anesthesia Stop:  1526    Procedure:  CABG X 6 WITH LIMA AND EVH WITH OPEN EXTENSION OF RLE (N/A Chest) Diagnosis:       Left main coronary artery disease      (Left main coronary artery disease [I25.10])    Surgeon:  Parveen Blackburn MD Provider:  Marie Richmond CRNA    Anesthesia Type:  general ASA Status:  4          Anesthesia Type: general  Last vitals  BP   126/82 (08/12/19 0530)   Temp   98.6 °F (37 °C) (08/12/19 0530)   Pulse   52 (08/12/19 0530)   Resp   18 (08/12/19 0530)     SpO2   97 % (08/12/19 0530)     Post Anesthesia Care and Evaluation    Patient location during evaluation: ICU  Patient participation: complete - patient cannot participate  Post-procedure mental status: sedated on vent.  Pain management: adequate  Airway patency: patent  Anesthetic complications: No anesthetic complications  PONV Status: NA  Cardiovascular status: stable and acceptable  Respiratory status: ETT and ventilator  Hydration status: stable    Comments: Blood pressure 126/82, pulse 52, temperature 98.6 °F (37 °C), temperature source Oral, resp. rate 18, height 180.3 cm (70.98\"), weight 88.6 kg (195 lb 7 oz), SpO2 97 %.        "

## 2019-08-12 NOTE — ANESTHESIA PROCEDURE NOTES
Central Line    Pre-sedation assessment completed: 8/12/2019 7:09 AM    Patient location during procedure: OR  Start time: 8/12/2019 7:35 AM  Stop Time:8/12/2019 7:43 AM  Indications: central pressure monitoring, vascular access and MD/Surgeon request  Staff  Anesthesiologist: Luis Alfredo Rossi MD  Preanesthetic Checklist  Completed: patient identified, site marked, surgical consent, pre-op evaluation, timeout performed, IV checked, risks and benefits discussed and monitors and equipment checked  Central Line Prep  Sterile Tech:gloves, cap, gown, mask and sterile barriers  Prep: chloraprep  Patient monitoring: blood pressure monitoring, continuous pulse oximetry and EKG  Central Line Procedure  Laterality:right  Location:internal jugular  Catheter Type:MAC, Cordis and Dwight-Umesh  Guidance:ultrasound guided  PROCEDURE NOTE/ULTRASOUND INTERPRETATION.  Using ultrasound guidance the potential vascular sites for insertion of the catheter were visualized to determine the patency of the vessel to be used for vascular access.  After selecting the appropriate site for insertion, the needle was visualized under ultrasound being inserted into the internal jugular vein, followed by ultrasound confirmation of wire and catheter placement. There were no abnormalities seen on ultrasound; an image was taken; and the patient tolerated the procedure with no complications. Images: still images not obtained  Assessment  Post procedure:biopatch applied, line sutured and occlusive dressing applied  Assessement:blood return through all ports, free fluid flow and chest x-ray ordered  Complications:no  Patient Tolerance:patient tolerated the procedure well with no apparent complications  Additional Notes  Dwight-Umesh placed at 55 cm

## 2019-08-12 NOTE — ANESTHESIA PREPROCEDURE EVALUATION
Anesthesia Evaluation     Patient summary reviewed   no history of anesthetic complications:  NPO Solid Status: > 8 hours  NPO Liquid Status: > 8 hours           Airway   Mallampati: II  TM distance: >3 FB  Neck ROM: full  Dental          Pulmonary - normal exam    breath sounds clear to auscultation  (+) a smoker (quit 25 yrs ago) Former,   (-) asthma, recent URI, sleep apnea  Cardiovascular - normal exam  Exercise tolerance: good (4-7 METS)    ECG reviewed  Patient on routine beta blocker and Beta blocker given within 24 hours of surgery  Rhythm: regular  Rate: normal    (+) hypertension, CAD, hyperlipidemia,   (-) pacemaker, past MI, angina, cardiac stents, CABG    ROS comment: TTE 8/10/19 - ·Left ventricular systolic function is low normal. EF 51-55%.  ·Mild aortic valve stenosis is present.  ·Normal size and function of the right ventricle.    Cath 8/9/19 - Distal left main coronary artery has 95% stenosis.  Left anterior descending coronary artery arises normally and proximally has a 90% stenosis  Mid to distal left anterior descending coronary artery has a 90% stenosis  The second diagonal branch has a 90% stenosis  First diagonal branch is small.  The left circumflex coronary artery is large and distally has a 90% stenosis.  Right coronary artery is dominant with distal 90% stenosis.  50 to 60% stenosis proximally.  Distal vessel has atherosclerotic plaques.  Bilateral internal mammary arteries are patent and can be used as a conduit  Left ventricular end-diastolic pressure moderately elevated to 21 mmHg.  No gradient across aortic valve pullback  Left ventriculogram not performed given critical left main stenosis.  By echocardiogram ejection fraction is preserved    Neuro/Psych  (-) seizures, TIA, CVA  GI/Hepatic/Renal/Endo    (-) GERD, liver disease, no renal disease, diabetes, hypothyroidism, hyperthyroidism    Musculoskeletal     Abdominal    Substance History      OB/GYN          Other                       Anesthesia Plan    ASA 4     general   (Preop arterial line  Post induction central line  Post induction BETTY (no CI to BETTY placement))  intravenous induction   Anesthetic plan, all risks, benefits, and alternatives have been provided, discussed and informed consent has been obtained with: patient.

## 2019-08-12 NOTE — PLAN OF CARE
Problem: Patient Care Overview  Goal: Plan of Care Review  Outcome: Ongoing (interventions implemented as appropriate)   08/12/19 0433   Coping/Psychosocial   Plan of Care Reviewed With patient   Plan of Care Review   Progress no change   OTHER   Outcome Summary VSS. No c/o of pain during shift. PM Lopressor held d/t HR staying below 60. Plans for CABG this AM with Dr. Blackburn. Sinus andrew 51-57 on tele.        Problem: Cardiac: ACS (Acute Coronary Syndrome) (Adult)  Goal: Signs and Symptoms of Listed Potential Problems Will be Absent, Minimized or Managed (Cardiac: ACS)  Outcome: Ongoing (interventions implemented as appropriate)   08/12/19 0433   Goal/Outcome Evaluation   Problems Assessed (Acute Coronary Syndrome) all   Problems Present (Acute Coronary Syn) none

## 2019-08-12 NOTE — PERIOPERATIVE NURSING NOTE
Patient identified before entering or using armbands, surgical consent verified, and armbands removed.  Cerebral oximeter pads placed on patients forehead just above the eyebrows.  Patient transferred to OR table without difficulty, 5 lead EKG and balloon pump leads covered with tape, and fast patches with tegaderm covers applied.  Cerebral oximeter pads connected to fore site and fast patches connected to defibrillator prior to induction.       ICU notification  Surgery start - Martha at 0911  On bypass - Kylah at 1033  Off bypass - Kylah at 1424        Vein harvest  Start - 0833  End - 1000      Vein harvest site - EVH of RLE with Open Extension    Performed by Harjit Barber, CFA/CST

## 2019-08-13 ENCOUNTER — APPOINTMENT (OUTPATIENT)
Dept: GENERAL RADIOLOGY | Facility: HOSPITAL | Age: 74
End: 2019-08-13

## 2019-08-13 PROBLEM — I10 HYPERTENSION: Status: ACTIVE | Noted: 2019-08-13

## 2019-08-13 PROBLEM — E78.00 HYPERCHOLESTEREMIA: Status: ACTIVE | Noted: 2019-08-13

## 2019-08-13 PROBLEM — I20.0 UNSTABLE ANGINA (HCC): Status: ACTIVE | Noted: 2019-08-13

## 2019-08-13 LAB
A-A DO2: ABNORMAL MMHG
ALBUMIN SERPL-MCNC: 2.9 G/DL (ref 3.5–5)
ANION GAP SERPL CALCULATED.3IONS-SCNC: 5 MMOL/L (ref 4–13)
ARTERIAL PATENCY WRIST A: ABNORMAL
ARTERIAL PATENCY WRIST A: NORMAL
ATMOSPHERIC PRESS: 745 MMHG
ATMOSPHERIC PRESS: 745 MMHG
ATMOSPHERIC PRESS: 746 MMHG
ATMOSPHERIC PRESS: 748 MMHG
BASE EXCESS BLDA CALC-SCNC: -0.4 MMOL/L (ref 0–2)
BASE EXCESS BLDA CALC-SCNC: -1.3 MMOL/L (ref 0–2)
BASE EXCESS BLDA CALC-SCNC: -2.7 MMOL/L (ref 0–2)
BASE EXCESS BLDA CALC-SCNC: 0.4 MMOL/L (ref 0–2)
BASE EXCESS BLDA CALC-SCNC: 0.6 MMOL/L (ref 0–2)
BASE EXCESS BLDA CALC-SCNC: 0.7 MMOL/L (ref 0–2)
BDY SITE: ABNORMAL
BDY SITE: NORMAL
BODY TEMPERATURE: 37 C
BUN BLD-MCNC: 15 MG/DL (ref 5–21)
BUN/CREAT SERPL: 19.7 (ref 7–25)
CALCIUM SPEC-SCNC: 8.3 MG/DL (ref 8.4–10.4)
CHLORIDE SERPL-SCNC: 111 MMOL/L (ref 98–110)
CLUMPED PLATELETS: PRESENT
CO2 SERPL-SCNC: 26 MMOL/L (ref 24–31)
COHGB MFR BLD: 0.9 % (ref 0–5)
CREAT BLD-MCNC: 0.76 MG/DL (ref 0.5–1.4)
DEPRECATED RDW RBC AUTO: 44.2 FL (ref 37–54)
DEPRECATED RDW RBC AUTO: 45.7 FL (ref 37–54)
ERYTHROCYTE [DISTWIDTH] IN BLOOD BY AUTOMATED COUNT: 13.2 % (ref 12.3–15.4)
ERYTHROCYTE [DISTWIDTH] IN BLOOD BY AUTOMATED COUNT: 13.5 % (ref 12.3–15.4)
GAS FLOW AIRWAY: 2 LPM
GAS FLOW AIRWAY: 2 LPM
GFR SERPL CREATININE-BSD FRML MDRD: 101 ML/MIN/1.73
GIANT PLATELETS: ABNORMAL
GIANT PLATELETS: ABNORMAL
GLUCOSE BLD-MCNC: 94 MG/DL (ref 70–100)
GLUCOSE BLDC GLUCOMTR-MCNC: 101 MG/DL (ref 70–130)
GLUCOSE BLDC GLUCOMTR-MCNC: 106 MG/DL (ref 70–130)
GLUCOSE BLDC GLUCOMTR-MCNC: 120 MG/DL (ref 70–130)
GLUCOSE BLDC GLUCOMTR-MCNC: 121 MG/DL (ref 70–130)
GLUCOSE BLDC GLUCOMTR-MCNC: 122 MG/DL (ref 70–130)
GLUCOSE BLDC GLUCOMTR-MCNC: 134 MG/DL (ref 70–130)
GLUCOSE BLDC GLUCOMTR-MCNC: 137 MG/DL (ref 70–130)
GLUCOSE BLDC GLUCOMTR-MCNC: 139 MG/DL (ref 70–130)
GLUCOSE BLDC GLUCOMTR-MCNC: 141 MG/DL (ref 70–130)
GLUCOSE BLDC GLUCOMTR-MCNC: 142 MG/DL (ref 70–130)
GLUCOSE BLDC GLUCOMTR-MCNC: 157 MG/DL (ref 70–130)
GLUCOSE BLDC GLUCOMTR-MCNC: 91 MG/DL (ref 70–130)
HCO3 BLDA-SCNC: 23.1 MMOL/L (ref 20–26)
HCO3 BLDA-SCNC: 24.2 MMOL/L (ref 20–26)
HCO3 BLDA-SCNC: 24.5 MMOL/L (ref 20–26)
HCO3 BLDA-SCNC: 25.4 MMOL/L (ref 20–26)
HCO3 BLDA-SCNC: 25.7 MMOL/L (ref 20–26)
HCO3 BLDA-SCNC: 26.4 MMOL/L (ref 20–26)
HCT VFR BLD AUTO: 25.4 % (ref 37.5–51)
HCT VFR BLD AUTO: 26.3 % (ref 37.5–51)
HCT VFR BLD CALC: 27.6 % (ref 38–51)
HGB BLD-MCNC: 8.7 G/DL (ref 13–17.7)
HGB BLD-MCNC: 9 G/DL (ref 13–17.7)
HGB BLDA-MCNC: 9 G/DL (ref 14–18)
HOROWITZ INDEX BLD+IHG-RTO: 35 %
HOROWITZ INDEX BLD+IHG-RTO: 35 %
HOROWITZ INDEX BLD+IHG-RTO: 40 %
HOROWITZ INDEX BLD+IHG-RTO: 45 %
INR PPP: 1.21 (ref 0.91–1.09)
LV EF 2D ECHO EST: 55 %
LYMPHOCYTES # BLD MANUAL: 0.65 10*3/MM3 (ref 0.7–3.1)
LYMPHOCYTES # BLD MANUAL: 1.54 10*3/MM3 (ref 0.7–3.1)
LYMPHOCYTES NFR BLD MANUAL: 14.1 % (ref 19.6–45.3)
LYMPHOCYTES NFR BLD MANUAL: 5.1 % (ref 5–12)
LYMPHOCYTES NFR BLD MANUAL: 6 % (ref 19.6–45.3)
LYMPHOCYTES NFR BLD MANUAL: 6 % (ref 5–12)
Lab: ABNORMAL
Lab: NORMAL
MCH RBC QN AUTO: 31.6 PG (ref 26.6–33)
MCH RBC QN AUTO: 32 PG (ref 26.6–33)
MCHC RBC AUTO-ENTMCNC: 34.2 G/DL (ref 31.5–35.7)
MCHC RBC AUTO-ENTMCNC: 34.3 G/DL (ref 31.5–35.7)
MCV RBC AUTO: 92.3 FL (ref 79–97)
MCV RBC AUTO: 93.4 FL (ref 79–97)
METHGB BLD QL: 0.8 % (ref 0–3)
MODALITY: ABNORMAL
MODALITY: NORMAL
MONOCYTES # BLD AUTO: 0.56 10*3/MM3 (ref 0.1–0.9)
MONOCYTES # BLD AUTO: 0.65 10*3/MM3 (ref 0.1–0.9)
NEUTROPHILS # BLD AUTO: 8.8 10*3/MM3 (ref 1.7–7)
NEUTROPHILS # BLD AUTO: 9.47 10*3/MM3 (ref 1.7–7)
NEUTROPHILS NFR BLD MANUAL: 67 % (ref 42.7–76)
NEUTROPHILS NFR BLD MANUAL: 73.7 % (ref 42.7–76)
NEUTS BAND NFR BLD MANUAL: 21 % (ref 0–5)
NEUTS BAND NFR BLD MANUAL: 7.1 % (ref 0–5)
NOTE: ABNORMAL
OXYHGB MFR BLDV: 90.3 % (ref 94–99)
PCO2 BLDA: 38.5 MM HG (ref 35–45)
PCO2 BLDA: 41.9 MM HG (ref 35–45)
PCO2 BLDA: 42.8 MM HG (ref 35–45)
PCO2 BLDA: 43.7 MM HG (ref 35–45)
PCO2 BLDA: 45 MM HG (ref 35–45)
PCO2 BLDA: 46.8 MM HG (ref 35–45)
PCO2 TEMP ADJ BLD: ABNORMAL MM HG (ref 35–45)
PEEP RESPIRATORY: 5 CM[H2O]
PH BLDA: 7.33 PH UNITS (ref 7.35–7.45)
PH BLDA: 7.34 PH UNITS (ref 7.35–7.45)
PH BLDA: 7.36 PH UNITS (ref 7.35–7.45)
PH BLDA: 7.39 PH UNITS (ref 7.35–7.45)
PH BLDA: 7.39 PH UNITS (ref 7.35–7.45)
PH BLDA: 7.41 PH UNITS (ref 7.35–7.45)
PH, TEMP CORRECTED: ABNORMAL PH UNITS (ref 7.35–7.45)
PHOSPHATE SERPL-MCNC: 3.2 MG/DL (ref 2.5–4.5)
PLATELET # BLD AUTO: 160 10*3/MM3 (ref 140–450)
PLATELET # BLD AUTO: 167 10*3/MM3 (ref 140–450)
PMV BLD AUTO: 10.5 FL (ref 6–12)
PMV BLD AUTO: 10.7 FL (ref 6–12)
PO2 BLDA: 124 MM HG (ref 83–108)
PO2 BLDA: 58.8 MM HG (ref 83–108)
PO2 BLDA: 78.7 MM HG (ref 83–108)
PO2 BLDA: 80 MM HG (ref 83–108)
PO2 BLDA: 81.4 MM HG (ref 83–108)
PO2 BLDA: 92.4 MM HG (ref 83–108)
PO2 TEMP ADJ BLD: ABNORMAL MM HG (ref 83–108)
POTASSIUM BLD-SCNC: 4.2 MMOL/L (ref 3.5–5.3)
POTASSIUM BLDA-SCNC: 4.2 MMOL/L (ref 3.5–5.2)
PROTHROMBIN TIME: 15.7 SECONDS (ref 11.9–14.6)
PSV: 10 CMH2O
RBC # BLD AUTO: 2.72 10*6/MM3 (ref 4.14–5.8)
RBC # BLD AUTO: 2.85 10*6/MM3 (ref 4.14–5.8)
RBC MORPH BLD: NORMAL
RBC MORPH BLD: NORMAL
SAO2 % BLDCOA: 91.9 % (ref 94–99)
SAO2 % BLDCOA: 96 % (ref 94–99)
SAO2 % BLDCOA: 96.1 % (ref 94–99)
SAO2 % BLDCOA: 96.8 % (ref 94–99)
SAO2 % BLDCOA: 97.8 % (ref 94–99)
SAO2 % BLDCOA: 98.8 % (ref 94–99)
SET MECH RESP RATE: 12
SET MECH RESP RATE: 14
SET MECH RESP RATE: 16
SODIUM BLD-SCNC: 142 MMOL/L (ref 135–145)
SODIUM BLDA-SCNC: 138 MMOL/L (ref 136–145)
VENTILATOR MODE: ABNORMAL
VENTILATOR MODE: NORMAL
VT ON VENT VENT: 650 ML
WBC MORPH BLD: NORMAL
WBC MORPH BLD: NORMAL
WBC NRBC COR # BLD: 10.76 10*3/MM3 (ref 3.4–10.8)
WBC NRBC COR # BLD: 10.89 10*3/MM3 (ref 3.4–10.8)

## 2019-08-13 PROCEDURE — 82805 BLOOD GASES W/O2 SATURATION: CPT

## 2019-08-13 PROCEDURE — 93005 ELECTROCARDIOGRAM TRACING: CPT | Performed by: THORACIC SURGERY (CARDIOTHORACIC VASCULAR SURGERY)

## 2019-08-13 PROCEDURE — 99024 POSTOP FOLLOW-UP VISIT: CPT | Performed by: NURSE PRACTITIONER

## 2019-08-13 PROCEDURE — 85007 BL SMEAR W/DIFF WBC COUNT: CPT | Performed by: THORACIC SURGERY (CARDIOTHORACIC VASCULAR SURGERY)

## 2019-08-13 PROCEDURE — 85025 COMPLETE CBC W/AUTO DIFF WBC: CPT | Performed by: THORACIC SURGERY (CARDIOTHORACIC VASCULAR SURGERY)

## 2019-08-13 PROCEDURE — 25010000002 ENOXAPARIN PER 10 MG: Performed by: THORACIC SURGERY (CARDIOTHORACIC VASCULAR SURGERY)

## 2019-08-13 PROCEDURE — 94799 UNLISTED PULMONARY SVC/PX: CPT

## 2019-08-13 PROCEDURE — 83050 HGB METHEMOGLOBIN QUAN: CPT

## 2019-08-13 PROCEDURE — 25810000003 DEXTROSE 5 % WITH KCL 20 MEQ 20-5 MEQ/L-% SOLUTION: Performed by: THORACIC SURGERY (CARDIOTHORACIC VASCULAR SURGERY)

## 2019-08-13 PROCEDURE — 94760 N-INVAS EAR/PLS OXIMETRY 1: CPT

## 2019-08-13 PROCEDURE — 25010000002 PHENYLEPHRINE 10 MG/ML SOLUTION 5 ML VIAL: Performed by: THORACIC SURGERY (CARDIOTHORACIC VASCULAR SURGERY)

## 2019-08-13 PROCEDURE — 71045 X-RAY EXAM CHEST 1 VIEW: CPT

## 2019-08-13 PROCEDURE — 25010000002 CEFAZOLIN PER 500 MG: Performed by: THORACIC SURGERY (CARDIOTHORACIC VASCULAR SURGERY)

## 2019-08-13 PROCEDURE — 25010000002 MORPHINE PER 10 MG: Performed by: THORACIC SURGERY (CARDIOTHORACIC VASCULAR SURGERY)

## 2019-08-13 PROCEDURE — 93010 ELECTROCARDIOGRAM REPORT: CPT | Performed by: INTERNAL MEDICINE

## 2019-08-13 PROCEDURE — 82803 BLOOD GASES ANY COMBINATION: CPT

## 2019-08-13 PROCEDURE — 97161 PT EVAL LOW COMPLEX 20 MIN: CPT | Performed by: PHYSICAL THERAPIST

## 2019-08-13 PROCEDURE — 25010000002 ONDANSETRON PER 1 MG: Performed by: THORACIC SURGERY (CARDIOTHORACIC VASCULAR SURGERY)

## 2019-08-13 PROCEDURE — 80069 RENAL FUNCTION PANEL: CPT | Performed by: THORACIC SURGERY (CARDIOTHORACIC VASCULAR SURGERY)

## 2019-08-13 PROCEDURE — 82375 ASSAY CARBOXYHB QUANT: CPT

## 2019-08-13 PROCEDURE — 97116 GAIT TRAINING THERAPY: CPT

## 2019-08-13 PROCEDURE — 85610 PROTHROMBIN TIME: CPT | Performed by: THORACIC SURGERY (CARDIOTHORACIC VASCULAR SURGERY)

## 2019-08-13 PROCEDURE — 82962 GLUCOSE BLOOD TEST: CPT

## 2019-08-13 RX ORDER — LIDOCAINE 50 MG/G
2 PATCH TOPICAL
Status: DISCONTINUED | OUTPATIENT
Start: 2019-08-13 | End: 2019-08-19 | Stop reason: HOSPADM

## 2019-08-13 RX ADMIN — IPRATROPIUM BROMIDE AND ALBUTEROL SULFATE 3 ML: 2.5; .5 SOLUTION RESPIRATORY (INHALATION) at 10:27

## 2019-08-13 RX ADMIN — PHENYLEPHRINE HYDROCHLORIDE 0.5 MCG/KG/MIN: 10 INJECTION INTRAVENOUS at 22:13

## 2019-08-13 RX ADMIN — OXYCODONE HYDROCHLORIDE AND ACETAMINOPHEN 1 TABLET: 10; 325 TABLET ORAL at 08:07

## 2019-08-13 RX ADMIN — PHENYLEPHRINE HYDROCHLORIDE 0.5 MCG/KG/MIN: 10 INJECTION INTRAVENOUS at 17:46

## 2019-08-13 RX ADMIN — ENOXAPARIN SODIUM 30 MG: 30 INJECTION SUBCUTANEOUS at 22:20

## 2019-08-13 RX ADMIN — NITROGLYCERIN 5 MCG/MIN: 20 INJECTION INTRAVENOUS at 15:55

## 2019-08-13 RX ADMIN — ATORVASTATIN CALCIUM 20 MG: 10 TABLET, FILM COATED ORAL at 20:39

## 2019-08-13 RX ADMIN — OXYCODONE HYDROCHLORIDE AND ACETAMINOPHEN 1 TABLET: 10; 325 TABLET ORAL at 15:47

## 2019-08-13 RX ADMIN — ENOXAPARIN SODIUM 30 MG: 30 INJECTION SUBCUTANEOUS at 08:10

## 2019-08-13 RX ADMIN — DEXTROSE MONOHYDRATE AND POTASSIUM CHLORIDE 30 ML/HR: 5; .149 INJECTION, SOLUTION INTRAVENOUS at 15:50

## 2019-08-13 RX ADMIN — IPRATROPIUM BROMIDE AND ALBUTEROL SULFATE 3 ML: 2.5; .5 SOLUTION RESPIRATORY (INHALATION) at 20:50

## 2019-08-13 RX ADMIN — ASPIRIN 162 MG: 81 TABLET, CHEWABLE ORAL at 08:08

## 2019-08-13 RX ADMIN — METOPROLOL TARTRATE 12.5 MG: 25 TABLET, FILM COATED ORAL at 08:08

## 2019-08-13 RX ADMIN — LIDOCAINE 2 PATCH: 50 PATCH TOPICAL at 12:02

## 2019-08-13 RX ADMIN — BISACODYL 10 MG: 5 TABLET ORAL at 08:08

## 2019-08-13 RX ADMIN — METOPROLOL TARTRATE 12.5 MG: 25 TABLET, FILM COATED ORAL at 20:39

## 2019-08-13 RX ADMIN — MORPHINE SULFATE 2 MG: 2 INJECTION, SOLUTION INTRAMUSCULAR; INTRAVENOUS at 00:03

## 2019-08-13 RX ADMIN — SODIUM CHLORIDE 3 UNITS/HR: 9 INJECTION, SOLUTION INTRAVENOUS at 04:18

## 2019-08-13 RX ADMIN — SODIUM CHLORIDE 2 G: 9 INJECTION, SOLUTION INTRAVENOUS at 04:18

## 2019-08-13 RX ADMIN — AMIODARONE HYDROCHLORIDE 400 MG: 200 TABLET ORAL at 08:08

## 2019-08-13 RX ADMIN — CHLORHEXIDINE GLUCONATE 15 ML: 1.2 RINSE ORAL at 06:12

## 2019-08-13 RX ADMIN — IPRATROPIUM BROMIDE AND ALBUTEROL SULFATE 3 ML: 2.5; .5 SOLUTION RESPIRATORY (INHALATION) at 14:42

## 2019-08-13 RX ADMIN — Medication 1 APPLICATION: at 17:46

## 2019-08-13 RX ADMIN — OXYCODONE HYDROCHLORIDE AND ACETAMINOPHEN 1 TABLET: 5; 325 TABLET ORAL at 04:11

## 2019-08-13 RX ADMIN — SODIUM CHLORIDE 2 G: 9 INJECTION, SOLUTION INTRAVENOUS at 20:11

## 2019-08-13 RX ADMIN — ONDANSETRON HYDROCHLORIDE 4 MG: 2 SOLUTION INTRAMUSCULAR; INTRAVENOUS at 01:28

## 2019-08-13 RX ADMIN — OXYCODONE HYDROCHLORIDE AND ACETAMINOPHEN 1 TABLET: 10; 325 TABLET ORAL at 19:34

## 2019-08-13 RX ADMIN — IPRATROPIUM BROMIDE AND ALBUTEROL SULFATE 3 ML: 2.5; .5 SOLUTION RESPIRATORY (INHALATION) at 06:17

## 2019-08-13 RX ADMIN — BISACODYL 10 MG: 5 TABLET ORAL at 20:39

## 2019-08-13 RX ADMIN — Medication 1 APPLICATION: at 06:12

## 2019-08-13 RX ADMIN — SODIUM CHLORIDE 2 G: 9 INJECTION, SOLUTION INTRAVENOUS at 12:02

## 2019-08-13 RX ADMIN — CLOPIDOGREL BISULFATE 75 MG: 75 TABLET, FILM COATED ORAL at 17:46

## 2019-08-13 RX ADMIN — SODIUM CHLORIDE 1000 ML: 9 INJECTION, SOLUTION INTRAVENOUS at 22:16

## 2019-08-13 RX ADMIN — AMIODARONE HYDROCHLORIDE 400 MG: 200 TABLET ORAL at 20:39

## 2019-08-13 RX ADMIN — DEXTROSE MONOHYDRATE AND POTASSIUM CHLORIDE 30 ML/HR: 5; .149 INJECTION, SOLUTION INTRAVENOUS at 15:51

## 2019-08-13 NOTE — PLAN OF CARE
Problem: Patient Care Overview  Goal: Plan of Care Review  Outcome: Ongoing (interventions implemented as appropriate)   08/13/19 1104   Coping/Psychosocial   Plan of Care Reviewed With patient   Plan of Care Review   Progress no change   OTHER   Outcome Summary PT eval completed. Pt alert and oriented x4. Pt sitting up in chair upon arrival with complaints of incisional chest pain. Pt on 2 L O2 via nc throughout session. Pt performed warm up activities before standing CGA-min x2. Pt edu on sternal precautions. Pt ambulated 20 feet with CGA and displayed unsteady gait and SOB. Pt educated on pursed lip breathing. Pt will benefit from skilled PT to improve gait, balance and endurance. Recommend continued rehab at d/c.

## 2019-08-13 NOTE — PROGRESS NOTES
Discharge Planning Assessment  [facility]     Patient Name: Piter Ro  MRN: [id number]  Today's Date: 8/13/2019    Admit Date: 8/8/2019    Discharge Needs Assessment     Row Name 08/13/19 1509       Living Environment    Lives With  spouse    Current Living Arrangements  home/apartment/condo    Primary Care Provided by  self    Provides Primary Care For  no one    Family Caregiver if Needed  spouse;child(manpreet), adult    Quality of Family Relationships  supportive;involved    Able to Return to Prior Arrangements  yes       Resource/Environmental Concerns    Resource/Environmental Concerns  none       Transition Planning    Patient/Family Anticipates Transition to  home with family    Patient/Family Anticipated Services at Transition  none    Transportation Anticipated  family or friend will provide       Discharge Needs Assessment    Readmission Within the Last 30 Days  no previous admission in last 30 days    Concerns to be Addressed  no discharge needs identified;denies needs/concerns at this time    Equipment Currently Used at Home  none    Anticipated Changes Related to Illness  none    Equipment Needed After Discharge  none    Current Discharge Risk  chronically ill    Discharge Coordination/Progress  SW spoke with patient regarding discharge plan/needs.  Patient resides at home with his spouse.  Patient is independent, has a PCP and RX coverage.  Patient plans to return home upon discharge with no anticipated needs.        Discharge Plan    No documentation.       Destination      No service coordination in this encounter.      Durable Medical Equipment      No service coordination in this encounter.      Dialysis/Infusion      No service coordination in this encounter.      Home Medical Care      No service coordination in this encounter.      Therapy      No service coordination in this encounter.      Community Resources      No service coordination in this encounter.          Demographic Summary     No documentation.       Functional Status    No documentation.       Psychosocial    No documentation.       Abuse/Neglect    No documentation.       Legal    No documentation.       Substance Abuse    No documentation.       Patient Forms    No documentation.           ANATOLY ManceraW

## 2019-08-13 NOTE — PLAN OF CARE
Problem: Patient Care Overview  Goal: Plan of Care Review  Outcome: Ongoing (interventions implemented as appropriate)   08/13/19 6893   Coping/Psychosocial   Plan of Care Reviewed With patient   Plan of Care Review   Progress no change   OTHER   Outcome Summary patient remains in ICU. R IJ and Han in place. Insulin gtt at 3u/hr currently. Titrating. Nitro @ 5mcg/min. Worthy in place. Clear, yellow urine. Adequate UOP. Worthy care per protocol. SCD on left leg. Incision mid chest- CDI. Gauze and tape. Right leg incision with ace wrap in place. MST 200ml out thus far. KEV 160ml thus far. Extubation at 0359 to 2L NC with humidification. CHG bath given.        Problem: Cardiac: ACS (Acute Coronary Syndrome) (Adult)  Goal: Signs and Symptoms of Listed Potential Problems Will be Absent, Minimized or Managed (Cardiac: ACS)  Outcome: Ongoing (interventions implemented as appropriate)      Problem: Fall Risk (Adult)  Goal: Identify Related Risk Factors and Signs and Symptoms  Outcome: Outcome(s) achieved Date Met: 08/13/19    Goal: Absence of Fall  Outcome: Ongoing (interventions implemented as appropriate)      Problem: Cardiac Surgery (Adult)  Goal: Signs and Symptoms of Listed Potential Problems Will be Absent, Minimized or Managed (Cardiac Surgery)  Outcome: Ongoing (interventions implemented as appropriate)    Goal: Anesthesia/Sedation Recovery  Outcome: Outcome(s) achieved Date Met: 08/13/19

## 2019-08-13 NOTE — PROGRESS NOTES
"CABG x 6, Right EVH with open extension    POD 1    Extubated overnight to 2 liters nasal cannula. O2 sat 93-96%. Up in the chair. Rates pain 6/10. IS 1000.     IV gtts: IVF, insulin, nitro  Telemetry: sinus 70s  Hemodynamics reviewed: CO 7.2, CI 3.44, CVP 6, PAP 19/7 mean 12    Visit Vitals  BP 97/60   Pulse 79   Temp 98.9 °F (37.2 °C) (Axillary)   Resp 16   Ht 180.3 cm (70.98\")   Wt 96.4 kg (212 lb 9.6 oz)   SpO2 96%   BMI 29.67 kg/m²     Baseline weight 195 pounds    Intake/Output Summary (Last 24 hours) at 8/13/2019 0938  Last data filed at 8/13/2019 0800  Gross per 24 hour   Intake 5308.22 ml   Output 4052 ml   Net 1256.22 ml     MST output: 400 ml/24 hours  L Diego drain:275 ml/24 hours    Labs:      Chest x ray: support lines noted, bibasilar atelectasis, no pneumothorax    Physical Exam:  General: No apparent distress. In good spirits. Up in the chair.   Cardiovascular: Regular rate and rhythm without murmur, rubs, or gallops.    Pulmonary: Clear to auscultation bilaterally without wheezing, rubs, or rales.  Chest: Sternotomy incision clean, dry, and intact. Sternum stable. No clicks. Mediastinal tubes x 2 and diego drain x 1 with dressing clean, dry, and intact.   Abdomen: Soft, non-distended, and non-tender.  Extremities: Warm, moves all extremities. Saphenectomy site clean, dry, and intact.   Neurologic: Grossly intact with no focal deficits.       Impression:  Left main coronary artery disease  Unstable angina   Hypertension  Hypercholesteremia    Plan:  Add lidoderm patches  Encourage pulmonary toilet and ambulation  Routine post cardiac surgery regimen  Keep in ICU for now, reassess this afternoon  DW patient and nursing   "

## 2019-08-13 NOTE — THERAPY EVALUATION
Acute Care - Physical Therapy Initial Evaluation  [facility]     Patient Name: Piter Ro  : 1945  MRN: [id number]  Today's Date: 2019   Onset of Illness/Injury or Date of Surgery: 19  Date of Referral to PT: 19  Referring Physician: Dr. Blackburn      Admit Date: 2019    Visit Dx:     ICD-10-CM ICD-9-CM   1. Chest pain, unspecified type R07.9 786.50   2. Left main coronary artery disease I25.10 414.00   3. Impaired mobility Z74.09 799.89     Patient Active Problem List   Diagnosis   • Chest pain   • Left main coronary artery disease   • Unstable angina (CMS/HCC)   • Hypertension   • Hypercholesteremia     Past Medical History:   Diagnosis Date   • Hypertension      Past Surgical History:   Procedure Laterality Date   • CARDIAC CATHETERIZATION Bilateral 2019    Procedure: Coronary angiography;  Surgeon: Rivera Tovar MD;  Location:  [facility]ATH [facility];  Service: Cardiovascular   • CARDIAC CATHETERIZATION N/A 2019    Procedure: Left Heart Cath;  Surgeon: Rivera Tovar MD;  Location:  [facility];  Service: Cardiovascular   • CARDIAC CATHETERIZATION Bilateral 2019    Procedure: Native mammary injection;  Surgeon: Rivera Tovar MD;  Location:  [facility];  Service: Cardiovascular   • CORONARY ARTERY BYPASS GRAFT N/A 2019    Procedure: CABG X 6 WITH LIMA AND EVH WITH OPEN EXTENSION OF RLE;  Surgeon: Parveen Blackburn MD;  Location: [facility] OR;  Service: Cardiothoracic   • HERNIA REPAIR     • VASECTOMY          PT ASSESSMENT (last 12 hours)      Physical Therapy Evaluation     Row Name 19 0823          PT Evaluation Time/Intention    Subjective Information  complains of;pain;swelling;weakness;numbness numbness in L foot, swelling in hands  -SB     Document Type  evaluation  -SB     Mode of Treatment  physical therapy  -SB     Patient Effort  good  -SB     Symptoms Noted During/After Treatment  shortness of breath;fatigue   -SB     Row Name 08/13/19 0823          General Information    Patient Profile Reviewed?  yes  -SB     Onset of Illness/Injury or Date of Surgery  08/12/19  -SB     Referring Physician  Dr. Blackburn  -SB     Patient Observations  alert;cooperative;agree to therapy  -SB     Patient/Family Observations  no family present  -SB     General Observations of Patient  alert, sitting in chair, chest tube, art line, bowers, tele  -SB     Prior Level of Function  independent:;all household mobility;dressing;bathing;grooming  -SB     Equipment Currently Used at Home  shower chair  -SB     Pertinent History of Current Functional Problem  s/p 8/12 CABG X 6 with LIMA and EVH with open extension of RLE  -SB     Existing Precautions/Restrictions  sternal;fall  -SB     Risks Reviewed  patient:;LOB;nausea/vomiting;dizziness;increased discomfort;increased drainage;lines disloged;change in vital signs  -SB     Benefits Reviewed  patient:;improve function;increase independence;increase strength;increase balance;decrease risk of DVT;decrease pain;improve skin integrity;increase knowledge  -SB     Row Name 08/13/19 0823          Relationship/Environment    Primary Source of Support/Comfort  spouse  -SB     Lives With  spouse  -SB     Row Name 08/13/19 0823          Resource/Environmental Concerns    Current Living Arrangements  home/apartment/condo  -SB     Resource/Environmental Concerns  none  -SB     Row Name 08/13/19 0823          Living Environment    Home Accessibility  stairs to enter home;other (see comments) walk in shower  -SB     Row Name 08/13/19 0823          Home Main Entrance    Number of Stairs, Main Entrance  two  -SB     Stair Railings, Main Entrance  none  -SB     Row Name 08/13/19 0823          Cognitive Assessment/Interventions    Additional Documentation  Cognitive Assessment/Intervention (Group)  -SB     Row Name 08/13/19 0823          Cognitive Assessment/Intervention- PT/OT    Affect/Mental Status (Cognitive)  WFL   -SB     Orientation Status (Cognition)  oriented x 4  -SB     Follows Commands (Cognition)  WFL  -SB     Cognitive Function (Cognitive)  WFL  -SB     Personal Safety Interventions  fall prevention program maintained;muscle strengthening facilitated;nonskid shoes/slippers when out of bed;supervised activity  -SB     Row Name 08/13/19 0823          Safety Issues, Functional Mobility    Impairments Affecting Function (Mobility)  balance;endurance/activity tolerance;pain;shortness of breath;strength  -SB     Row Name 08/13/19 0823          Bed Mobility Assessment/Treatment    Comment (Bed Mobility)  up in chair at arrival  -SB     Row Name 08/13/19 0823          Transfer Assessment/Treatment    Transfer Assessment/Treatment  sit-stand transfer;stand-sit transfer  -SB     Sit-Stand [place] (Transfers)  minimum assist (75% patient effort);contact guard;2 person assist  -SB     Stand-Sit [place] (Transfers)  minimum assist (75% patient effort);contact guard;2 person assist  -SB     Row Name 08/13/19 0823          Gait/Stairs Assessment/Training    Gait/Stairs Assessment/Training  gait/ambulation independence;distance ambulated  -SB     [place] Level (Gait)  contact guard  -SB     Distance in Feet (Gait)  20  -SB     Pattern (Gait)  step-through  -SB     Deviations/Abnormal Patterns (Gait)  joselito decreased;gait speed decreased;festinating/shuffling  -SB     Comment (Gait/Stairs)  pt fatigued quickly with gait and requested to turn around due to leg weakness  -SB     Row Name 08/13/19 0823          General ROM    GENERAL ROM COMMENTS  BLE WFL  -SB     Row Name 08/13/19 0823          MMT (Manual Muscle Testing)    General MMT Comments  BLE 4/5 functionally  -SB     Row Name 08/13/19 0823          Motor Assessment/Intervention    Additional Documentation  Balance (Group)  -SB     Row Name 08/13/19 0823          Balance    Balance  static standing balance  -SB     Row Name 08/13/19 0823          Static Standing  Balance    Level of [place] (Supported Standing, Static Balance)  contact guard assist  -SB     Row Name 08/13/19 0823          Sensory Assessment/Intervention    Sensory General Assessment  no sensation deficits identified  -SB     Row Name 08/13/19 0823          Pain Assessment    Additional Documentation  Pain Scale: Numbers Pre/Post-Treatment (Group)  -SB     Row Name 08/13/19 0823          Pain Scale: Numbers Pre/Post-Treatment    Pain Scale: Numbers, Pretreatment  4/10  -SB     Pain Scale: Numbers, Post-Treatment  4/10  -SB     Pain Location - Orientation  incisional  -SB     Pain Location  chest  -SB     Pain Intervention(s)  Medication (See MAR);Repositioned;Ambulation/increased activity  -SB     Row Name             Wound 08/12/19 1312 chest Incision    Wound - Properties Group Date first assessed: 08/12/19  -TM Time first assessed: 1312  -TM Location: chest  -TM Primary Wound Type: Incision  -TM    Row Name             Wound 08/12/19 1312 Right leg Incision    Wound - Properties Group Date first assessed: 08/12/19  -TM Time first assessed: 1312  -TM Side: Right  -TM Location: leg  -TM Primary Wound Type: Incision  -TM    Row Name 08/13/19 0823          Plan of Care Review    Plan of Care Reviewed With  patient  -SB     Row Name 08/13/19 0823          Physical Therapy Clinical Impression    Date of Referral to PT  08/13/19  -SB     Patient/Family Goals Statement (PT Clinical Impression)  return home  -SB     Criteria for Skilled Interventions Met (PT Clinical Impression)  yes  -SB     Impairments Found (describe specific impairments)  aerobic capacity/endurance;gait, locomotion, and balance;muscle performance  -SB     Rehab Potential (PT Clinical Summary)  good, to achieve stated therapy goals  -SB     Predicted Duration of Therapy (PT)  until d/c  -SB     Care Plan Review (PT)  evaluation/treatment results reviewed;care plan/treatment goals reviewed;risks/benefits reviewed;current/potential barriers  reviewed;patient/other agree to care plan  -SB     Row Name 08/13/19 0823          Vital Signs    Pre Systolic BP Rehab  97  -SB     Pre Treatment Diastolic BP  60  -SB     Post Systolic BP Rehab  90  -SB     Post Treatment Diastolic BP  50  -SB     Pretreatment Heart Rate (beats/min)  79  -SB     Posttreatment Heart Rate (beats/min)  84  -SB     Pretreatment Resp Rate (breaths/min)  19  -SB     Posttreatment Resp Rate (breaths/min)  23  -SB     Pre SpO2 (%)  96  -SB     O2 Delivery Pre Treatment  nasal cannula  -SB     O2 Delivery Intra Treatment  nasal cannula  -SB     Post SpO2 (%)  93  -SB     O2 Delivery Post Treatment  nasal cannula  -SB     Pre Patient Position  Sitting  -SB     Intra Patient Position  Standing  -SB     Post Patient Position  Sitting  -SB     Row Name 08/13/19 0823          Physical Therapy Goals    Bed Mobility Goal Selection (PT)  bed mobility, PT goal 1  -SB     Transfer Goal Selection (PT)  transfer, PT goal 1  -SB     Gait Training Goal Selection (PT)  gait training, PT goal 1  -SB     Row Name 08/13/19 0823          Bed Mobility Goal 1 (PT)    Activity/Assistive Device (Bed Mobility Goal 1, PT)  bed mobility activities, all  -SB     [place] Level/Cues Needed (Bed Mobility Goal 1, PT)  minimum assist (75% or more patient effort)  -SB     Time Frame (Bed Mobility Goal 1, PT)  by discharge  -SB     Progress/Outcomes (Bed Mobility Goal 1, PT)  goal ongoing  -SB     Row Name 08/13/19 0823          Transfer Goal 1 (PT)    Activity/Assistive Device (Transfer Goal 1, PT)  sit-to-stand/stand-to-sit;bed-to-chair/chair-to-bed  -SB     [place] Level/Cues Needed (Transfer Goal 1, PT)  supervision required  -SB     Time Frame (Transfer Goal 1, PT)  by discharge  -SB     Progress/Outcome (Transfer Goal 1, PT)  goal ongoing  -SB     Row Name 08/13/19 0823          Gait Training Goal 1 (PT)    Activity/Assistive Device (Gait Training Goal 1, PT)  gait (walking locomotion);improve balance and  speed;increase endurance/gait distance;increase energy conservation;decrease fall risk  -SB     [place] Level (Gait Training Goal 1, PT)  supervision required  -SB     Distance (Gait Goal 1, PT)  250  -SB     Time Frame (Gait Training Goal 1, PT)  by discharge  -SB     Progress/Outcome (Gait Training Goal 1, PT)  goal ongoing  -SB     Row Name 08/13/19 0823          Positioning and Restraints    Pre-Treatment Position  sitting in chair/recliner  -SB     Post Treatment Position  chair  -SB     In Chair  notified nsg;reclined;call light within reach;encouraged to call for assist;legs elevated  -SB       User Key  (r) = Recorded By, (t) = Taken By, (c) = Cosigned By    Initials Name Provider Type    Winnie Colunga RN Registered Nurse    Jocelynn Hernandez PT Physical Therapist        Physical Therapy Education     Title: PT OT SLP Therapies (Done)     Topic: Physical Therapy (Done)     Point: Mobility training (Done)     Learning Progress Summary           Patient Acceptance, E, VU by SB at 8/13/2019 11:54 AM    Comment:  pt edu on POC, benefits of act, d/c plans, warm up/cool down exercises and sternal precautions                   Point: Home exercise program (Done)     Learning Progress Summary           Patient Acceptance, E, VU by SB at 8/13/2019 11:54 AM    Comment:  pt edu on POC, benefits of act, d/c plans, warm up/cool down exercises and sternal precautions                   Point: Body mechanics (Done)     Learning Progress Summary           Patient Acceptance, E, VU by SB at 8/13/2019 11:54 AM    Comment:  pt edu on POC, benefits of act, d/c plans, warm up/cool down exercises and sternal precautions                   Point: Precautions (Done)     Learning Progress Summary           Patient Acceptance, E, VU by SB at 8/13/2019 11:54 AM    Comment:  pt edu on POC, benefits of act, d/c plans, warm up/cool down exercises and sternal precautions                               User Key     Initials  Effective Dates Name Provider Type Discipline    SB 08/31/18 -  Jocelynn Robledo, PT Physical Therapist PT              PT Recommendation and Plan  Anticipated Discharge Disposition (PT): home with home health, home with assist, home with OP services  Planned Therapy Interventions (PT Eval): balance training, bed mobility training, gait training, home exercise program, patient/family education, ROM (range of motion), strengthening, transfer training  Therapy Frequency (PT Clinical Impression): 2 times/day  Outcome Summary/Treatment Plan (PT)  Anticipated Discharge Disposition (PT): home with home health, home with assist, home with OP services  Plan of Care Reviewed With: patient  Progress: no change  Outcome Summary: PT eval completed. Pt alert and oriented x4. Pt sitting up in chair upon arrival with complaints of incisional chest pain. Pt on 2 L O2 via nc throughout session. Pt performed warm up activities before standing CGA-min x2. Pt edu on sternal precautions. Pt ambulated 20 feet with CGA and displayed unsteady gait and SOB. Pt educated on pursed lip breathing. Pt will benefit from skilled PT to improve gait, balance and endurance. Recommend continued rehab at d/c.   Outcome Measures     Row Name 08/13/19 1100             How much help from another person do you currently need...    Turning from your back to your side while in flat bed without using bedrails?  3  -SB      Moving from lying on back to sitting on the side of a flat bed without bedrails?  2  -SB      Moving to and from a bed to a chair (including a wheelchair)?  3  -SB      Standing up from a chair using your arms (e.g., wheelchair, bedside chair)?  3  -SB      Climbing 3-5 steps with a railing?  2  -SB      To walk in hospital room?  3  -SB      AM-PAC 6 Clicks Score (PT)  16  -SB         Functional Assessment    Outcome Measure Options  AM-PAC 6 Clicks Basic Mobility (PT)  -SB        User Key  (r) = Recorded By, (t) = Taken By, (c) = Cosigned  By    Initials Name Provider Type    Jocelynn Hernandez PT Physical Therapist         Time Calculation:   PT Charges     Row Name 08/13/19 1154             Time Calculation    Start Time  0826  -SB      Stop Time  0921  -SB      Time Calculation (min)  55 min  -SB      PT Received On  08/13/19  -SB      PT Goal Re-Cert Due Date  08/23/19  -SB        User Key  (r) = Recorded By, (t) = Taken By, (c) = Cosigned By    Initials Name Provider Type    Jocelynn Hernandez PT Physical Therapist        Therapy Charges for Today     Code Description Service Date Service Provider Modifiers Qty    [id number] HC PT EVAL LOW COMPLEXITY 4 8/13/2019 Jocelynn Robledo, PT GP 1          PT G-Codes  Outcome Measure Options: AM-PAC 6 Clicks Basic Mobility (PT)  AM-PAC 6 Clicks Score (PT): 16      Jocelynn Robledo PT  8/13/2019

## 2019-08-13 NOTE — THERAPY TREATMENT NOTE
Acute Care - Physical Therapy Treatment Note  [facility]     Patient Name: Piter Ro  : 1945  MRN: [id number]  Today's Date: 2019  Onset of Illness/Injury or Date of Surgery: 19  Date of Referral to PT: 19  Referring Physician: Dr. Blackburn    Admit Date: 2019    Visit Dx:    ICD-10-CM ICD-9-CM   1. Chest pain, unspecified type R07.9 786.50   2. Left main coronary artery disease I25.10 414.00   3. Impaired mobility Z74.09 799.89     Patient Active Problem List   Diagnosis   • Chest pain   • Left main coronary artery disease   • Unstable angina (CMS/HCC)   • Hypertension   • Hypercholesteremia       Therapy Treatment    Rehabilitation Treatment Summary     Row Name 19 1425             Treatment Time/Intention    Discipline  physical therapy assistant  -      Document Type  therapy note (daily note)  -AH2      Subjective Information  complains of;weakness;fatigue;pain  -AH2      Existing Precautions/Restrictions  cardiac;fall;sternal  -AH2      Recorded by [AH] Clarisa Chavarria, [facility] 19 1443  [AH2] Clarisa Chavarria, [facility] 19 1456      Row Name 19 1425             Vital Signs    Pre Systolic BP Rehab  106  -AH      Pre Treatment Diastolic BP  68  -AH      Post Systolic BP Rehab  119  -AH      Post Treatment Diastolic BP  67  -AH      Pretreatment Heart Rate (beats/min)  75  -AH      Posttreatment Heart Rate (beats/min)  82  -AH      Pre SpO2 (%)  100  -AH      O2 Delivery Pre Treatment  nasal cannula 2L  -AH      Post SpO2 (%)  95  -AH      O2 Delivery Post Treatment  nasal cannula 2L  -AH      Recorded by [AH] Clarisa Chavarria, [facility] 19 1443      Row Name 19 1425             Bed Mobility Assessment/Treatment    Comment (Bed Mobility)  chair  -AH      Recorded by [] Clarisa Chavarria, [facility] 19 1456      Row Name 19 1425             Sit-Stand Transfer    Sit-Stand [place] (Transfers)  contact guard;minimum assist (75% patient effort);2  person assist;verbal cues  -AH      Recorded by [] Clarisa Chavarria, [facility] 08/13/19 1456      Row Name 08/13/19 1425             Stand-Sit Transfer    Stand-Sit [place] (Transfers)  contact guard;minimum assist (75% patient effort);2 person assist;verbal cues  -AH      Recorded by [] Clarisa Chavarria, [facility] 08/13/19 1456      Row Name 08/13/19 1425             Gait/Stairs Assessment/Training    [place] Level (Gait)  contact guard;minimum assist (75% patient effort);2 person assist;verbal cues  -AH      Distance in Feet (Gait)  100  -AH      Deviations/Abnormal Patterns (Gait)  joselito decreased  -AH      Recorded by [] Clarisa Chavarria, [facility] 08/13/19 1456      Row Name 08/13/19 1425             Motor Skills Assessment/Interventions    Additional Documentation  Therapeutic Exercise (Group)  -AH      Recorded by [] Clarisa Chavarria, [facility] 08/13/19 1456      Row Name 08/13/19 1425             Therapeutic Exercise    Comment (Therapeutic Exercise)  cardiac protocol warmup/cool down exercises x 20  -AH      Recorded by [] Clarisa Chavarria, [facility] 08/13/19 1456      Row Name 08/13/19 1425             Positioning and Restraints    Pre-Treatment Position  sitting in chair/recliner  -AH      Post Treatment Position  chair  -AH      In Chair  reclined;call light within reach;encouraged to call for assist;with nsg  -AH      Recorded by [] Clarisa Chavarria, [facility] 08/13/19 1456      Row Name 08/13/19 1425             Pain Scale: Numbers Pre/Post-Treatment    Pain Scale: Numbers, Pretreatment  4/10  -      Pain Scale: Numbers, Post-Treatment  4/10  -      Pain Location - Orientation  incisional  -      Pain Location  chest  -      Pain Intervention(s)  Medication (See MAR);Repositioned;Ambulation/increased activity  -AH      Recorded by [] Clarisa Chavarria, [facility] 08/13/19 1456      Row Name                Wound 08/12/19 1312 chest Incision    Wound - Properties Group Date first assessed: 08/12/19 [TM] Time first  assessed: 1312 [TM] Location: chest [TM] Primary Wound Type: Incision [TM] Recorded by:  [TM] Winnie Nagy RN 08/12/19 1312    Row Name                Wound 08/12/19 1312 Right leg Incision    Wound - Properties Group Date first assessed: 08/12/19 [TM] Time first assessed: 1312 [TM] Side: Right [TM] Location: leg [TM] Primary Wound Type: Incision [TM] Recorded by:  [TM] Winnie Nagy RN 08/12/19 1312      User Key  (r) = Recorded By, (t) = Taken By, (c) = Cosigned By    Initials Name Effective Dates Discipline     Clarisa Chavarria PTA 08/02/16 -  PT    TM Winnie Nagy RN 07/26/16 -  Nurse          Wound 08/12/19 1312 chest Incision (Active)   Dressing Appearance dry;intact 8/13/2019 12:05 PM   Closure RENALDO 8/13/2019 12:05 PM   Base dressing in place, unable to visualize 8/13/2019 12:05 PM   Drainage Amount none 8/13/2019 12:05 PM   Dressing Care, Wound gauze;non-adherent 8/13/2019  4:03 AM       Wound 08/12/19 1312 Right leg Incision (Active)   Dressing Appearance dry;intact 8/13/2019 12:05 PM   Closure RENALDO 8/13/2019 12:05 PM   Drainage Amount none 8/13/2019 12:05 PM   Dressing Care, Wound gauze;elastic bandage 8/13/2019  4:03 AM       Rehab Goal Summary     Row Name 08/13/19 0823             Physical Therapy Goals    Bed Mobility Goal Selection (PT)  bed mobility, PT goal 1  -SB      Transfer Goal Selection (PT)  transfer, PT goal 1  -SB      Gait Training Goal Selection (PT)  gait training, PT goal 1  -SB         Bed Mobility Goal 1 (PT)    Activity/Assistive Device (Bed Mobility Goal 1, PT)  bed mobility activities, all  -SB      Preemption Level/Cues Needed (Bed Mobility Goal 1, PT)  minimum assist (75% or more patient effort)  -SB      Time Frame (Bed Mobility Goal 1, PT)  by discharge  -SB      Progress/Outcomes (Bed Mobility Goal 1, PT)  goal ongoing  -SB         Transfer Goal 1 (PT)    Activity/Assistive Device (Transfer Goal 1, PT)   sit-to-stand/stand-to-sit;bed-to-chair/chair-to-bed  -SB      [place] Level/Cues Needed (Transfer Goal 1, PT)  supervision required  -SB      Time Frame (Transfer Goal 1, PT)  by discharge  -SB      Progress/Outcome (Transfer Goal 1, PT)  goal ongoing  -SB         Gait Training Goal 1 (PT)    Activity/Assistive Device (Gait Training Goal 1, PT)  gait (walking locomotion);improve balance and speed;increase endurance/gait distance;increase energy conservation;decrease fall risk  -SB      [place] Level (Gait Training Goal 1, PT)  supervision required  -SB      Distance (Gait Goal 1, PT)  250  -SB      Time Frame (Gait Training Goal 1, PT)  by discharge  -SB      Progress/Outcome (Gait Training Goal 1, PT)  goal ongoing  -SB        User Key  (r) = Recorded By, (t) = Taken By, (c) = Cosigned By    Initials Name Provider Type Discipline    Jocelynn Hernandez, PT Physical Therapist PT          Physical Therapy Education     Title: PT OT SLP Therapies (Done)     Topic: Physical Therapy (Done)     Point: Mobility training (Done)     Learning Progress Summary           Patient Acceptance, E, VU by SB at 8/13/2019 11:54 AM    Comment:  pt edu on POC, benefits of act, d/c plans, warm up/cool down exercises and sternal precautions                   Point: Home exercise program (Done)     Learning Progress Summary           Patient Acceptance, E, VU by SB at 8/13/2019 11:54 AM    Comment:  pt edu on POC, benefits of act, d/c plans, warm up/cool down exercises and sternal precautions                   Point: Body mechanics (Done)     Learning Progress Summary           Patient Acceptance, E, VU by SB at 8/13/2019 11:54 AM    Comment:  pt edu on POC, benefits of act, d/c plans, warm up/cool down exercises and sternal precautions                   Point: Precautions (Done)     Learning Progress Summary           Patient Acceptance, E, VU by SB at 8/13/2019 11:54 AM    Comment:  pt edu on POC, benefits of act, d/c plans,  warm up/cool down exercises and sternal precautions                               User Key     Initials Effective Dates Name Provider Type Discipline    SB 08/31/18 -  Jocelynn Robledo PT Physical Therapist PT                PT Recommendation and Plan        Outcome Measures     Row Name 08/13/19 1100             How much help from another person do you currently need...    Turning from your back to your side while in flat bed without using bedrails?  3  -SB      Moving from lying on back to sitting on the side of a flat bed without bedrails?  2  -SB      Moving to and from a bed to a chair (including a wheelchair)?  3  -SB      Standing up from a chair using your arms (e.g., wheelchair, bedside chair)?  3  -SB      Climbing 3-5 steps with a railing?  2  -SB      To walk in hospital room?  3  -SB      AM-PAC 6 Clicks Score (PT)  16  -SB         Functional Assessment    Outcome Measure Options  AM-PAC 6 Clicks Basic Mobility (PT)  -SB        User Key  (r) = Recorded By, (t) = Taken By, (c) = Cosigned By    Initials Name Provider Type    SB Jocelynn Robledo PT Physical Therapist         Time Calculation:   PT Charges     [facility] Name 08/13/19 1456 08/13/19 1154          Time Calculation    Start Time  1425  -  0826  -SB     Stop Time  1448  -AH  0921  -SB     Time Calculation (min)  23 min  -  55 min  -SB     PT Received On  --  08/13/19  -SB     PT Goal Re-Cert Due Date  --  08/23/19  -SB        Time Calculation- PT    Total Timed Code Minutes- PT  23 minute(s)  -AH  --        Timed Charges    38169 - Gait Training Minutes   23  -AH  --       User Key  (r) = Recorded By, (t) = Taken By, (c) = Cosigned By    Initials Name Provider Type     Clarisa Chavarria PTA Physical Therapy Assistant    SB Jocelynn Robledo, PT Physical Therapist        Therapy Charges for Today     Code Description Service Date Service Provider Modifiers Qty    [id number] HC GAIT TRAINING EA 15 MIN 8/13/2019 Clarisa Chavarria PTA GP 2          PT  G-Codes  Outcome Measure Options: AM-PAC 6 Clicks Basic Mobility (PT)  AM-PAC 6 Clicks Score (PT): 16    Clarisa Chavarria, PTA  8/13/2019

## 2019-08-14 ENCOUNTER — APPOINTMENT (OUTPATIENT)
Dept: GENERAL RADIOLOGY | Facility: HOSPITAL | Age: 74
End: 2019-08-14

## 2019-08-14 LAB
ANION GAP SERPL CALCULATED.3IONS-SCNC: 4 MMOL/L (ref 4–13)
BUN BLD-MCNC: 16 MG/DL (ref 5–21)
BUN/CREAT SERPL: 20.3 (ref 7–25)
CALCIUM SPEC-SCNC: 8 MG/DL (ref 8.4–10.4)
CHLORIDE SERPL-SCNC: 107 MMOL/L (ref 98–110)
CO2 SERPL-SCNC: 27 MMOL/L (ref 24–31)
CREAT BLD-MCNC: 0.79 MG/DL (ref 0.5–1.4)
DEPRECATED RDW RBC AUTO: 46.9 FL (ref 37–54)
ERYTHROCYTE [DISTWIDTH] IN BLOOD BY AUTOMATED COUNT: 13.7 % (ref 12.3–15.4)
GFR SERPL CREATININE-BSD FRML MDRD: 96 ML/MIN/1.73
GLUCOSE BLD-MCNC: 137 MG/DL (ref 70–100)
HCT VFR BLD AUTO: 24.2 % (ref 37.5–51)
HGB BLD-MCNC: 8.3 G/DL (ref 13–17.7)
MCH RBC QN AUTO: 32.2 PG (ref 26.6–33)
MCHC RBC AUTO-ENTMCNC: 34.3 G/DL (ref 31.5–35.7)
MCV RBC AUTO: 93.8 FL (ref 79–97)
PLATELET # BLD AUTO: 177 10*3/MM3 (ref 140–450)
PMV BLD AUTO: 10.6 FL (ref 6–12)
POTASSIUM BLD-SCNC: 4.4 MMOL/L (ref 3.5–5.3)
RBC # BLD AUTO: 2.58 10*6/MM3 (ref 4.14–5.8)
SODIUM BLD-SCNC: 138 MMOL/L (ref 135–145)
WBC NRBC COR # BLD: 12.66 10*3/MM3 (ref 3.4–10.8)

## 2019-08-14 PROCEDURE — 93005 ELECTROCARDIOGRAM TRACING: CPT | Performed by: THORACIC SURGERY (CARDIOTHORACIC VASCULAR SURGERY)

## 2019-08-14 PROCEDURE — 94799 UNLISTED PULMONARY SVC/PX: CPT

## 2019-08-14 PROCEDURE — 93010 ELECTROCARDIOGRAM REPORT: CPT | Performed by: INTERNAL MEDICINE

## 2019-08-14 PROCEDURE — 99024 POSTOP FOLLOW-UP VISIT: CPT | Performed by: NURSE PRACTITIONER

## 2019-08-14 PROCEDURE — P9041 ALBUMIN (HUMAN),5%, 50ML: HCPCS | Performed by: THORACIC SURGERY (CARDIOTHORACIC VASCULAR SURGERY)

## 2019-08-14 PROCEDURE — 80048 BASIC METABOLIC PNL TOTAL CA: CPT | Performed by: THORACIC SURGERY (CARDIOTHORACIC VASCULAR SURGERY)

## 2019-08-14 PROCEDURE — 71045 X-RAY EXAM CHEST 1 VIEW: CPT

## 2019-08-14 PROCEDURE — 25010000002 ENOXAPARIN PER 10 MG: Performed by: THORACIC SURGERY (CARDIOTHORACIC VASCULAR SURGERY)

## 2019-08-14 PROCEDURE — 25010000002 ALBUMIN HUMAN 5% PER 50 ML: Performed by: THORACIC SURGERY (CARDIOTHORACIC VASCULAR SURGERY)

## 2019-08-14 PROCEDURE — 97116 GAIT TRAINING THERAPY: CPT

## 2019-08-14 PROCEDURE — 85027 COMPLETE CBC AUTOMATED: CPT | Performed by: THORACIC SURGERY (CARDIOTHORACIC VASCULAR SURGERY)

## 2019-08-14 PROCEDURE — 25010000002 CEFAZOLIN PER 500 MG: Performed by: THORACIC SURGERY (CARDIOTHORACIC VASCULAR SURGERY)

## 2019-08-14 PROCEDURE — 25010000002 PHENYLEPHRINE 10 MG/ML SOLUTION 5 ML VIAL: Performed by: THORACIC SURGERY (CARDIOTHORACIC VASCULAR SURGERY)

## 2019-08-14 RX ORDER — ALBUMIN, HUMAN INJ 5% 5 %
500 SOLUTION INTRAVENOUS ONCE
Status: COMPLETED | OUTPATIENT
Start: 2019-08-14 | End: 2019-08-14

## 2019-08-14 RX ORDER — POLYETHYLENE GLYCOL 3350 17 G/17G
17 POWDER, FOR SOLUTION ORAL DAILY
Status: DISCONTINUED | OUTPATIENT
Start: 2019-08-14 | End: 2019-08-19 | Stop reason: HOSPADM

## 2019-08-14 RX ADMIN — CHLORHEXIDINE GLUCONATE 15 ML: 1.2 RINSE ORAL at 06:01

## 2019-08-14 RX ADMIN — ATORVASTATIN CALCIUM 20 MG: 10 TABLET, FILM COATED ORAL at 20:41

## 2019-08-14 RX ADMIN — BISACODYL 10 MG: 5 TABLET ORAL at 08:26

## 2019-08-14 RX ADMIN — OXYCODONE HYDROCHLORIDE AND ACETAMINOPHEN 1 TABLET: 10; 325 TABLET ORAL at 00:37

## 2019-08-14 RX ADMIN — AMIODARONE HYDROCHLORIDE 400 MG: 200 TABLET ORAL at 20:41

## 2019-08-14 RX ADMIN — PHENYLEPHRINE HYDROCHLORIDE 0.5 MCG/KG/MIN: 10 INJECTION INTRAVENOUS at 12:34

## 2019-08-14 RX ADMIN — METOPROLOL TARTRATE 12.5 MG: 25 TABLET, FILM COATED ORAL at 08:25

## 2019-08-14 RX ADMIN — OXYCODONE HYDROCHLORIDE AND ACETAMINOPHEN 1 TABLET: 10; 325 TABLET ORAL at 03:34

## 2019-08-14 RX ADMIN — CLOPIDOGREL BISULFATE 75 MG: 75 TABLET, FILM COATED ORAL at 17:44

## 2019-08-14 RX ADMIN — BISACODYL 10 MG: 5 TABLET ORAL at 20:42

## 2019-08-14 RX ADMIN — ENOXAPARIN SODIUM 30 MG: 30 INJECTION SUBCUTANEOUS at 20:49

## 2019-08-14 RX ADMIN — POLYETHYLENE GLYCOL (3350) 17 G: 17 POWDER, FOR SOLUTION ORAL at 09:07

## 2019-08-14 RX ADMIN — ASPIRIN 81 MG: 81 TABLET ORAL at 08:26

## 2019-08-14 RX ADMIN — IPRATROPIUM BROMIDE AND ALBUTEROL SULFATE 3 ML: 2.5; .5 SOLUTION RESPIRATORY (INHALATION) at 20:38

## 2019-08-14 RX ADMIN — Medication 1 APPLICATION: at 06:01

## 2019-08-14 RX ADMIN — METOPROLOL TARTRATE 12.5 MG: 25 TABLET, FILM COATED ORAL at 20:41

## 2019-08-14 RX ADMIN — ALBUMIN HUMAN 500 ML: 0.05 INJECTION, SOLUTION INTRAVENOUS at 10:58

## 2019-08-14 RX ADMIN — AMIODARONE HYDROCHLORIDE 400 MG: 200 TABLET ORAL at 08:26

## 2019-08-14 RX ADMIN — LIDOCAINE 2 PATCH: 50 PATCH TOPICAL at 08:27

## 2019-08-14 RX ADMIN — IPRATROPIUM BROMIDE AND ALBUTEROL SULFATE 3 ML: 2.5; .5 SOLUTION RESPIRATORY (INHALATION) at 06:28

## 2019-08-14 RX ADMIN — OXYCODONE HYDROCHLORIDE AND ACETAMINOPHEN 1 TABLET: 10; 325 TABLET ORAL at 20:49

## 2019-08-14 RX ADMIN — ENOXAPARIN SODIUM 30 MG: 30 INJECTION SUBCUTANEOUS at 09:07

## 2019-08-14 RX ADMIN — IPRATROPIUM BROMIDE AND ALBUTEROL SULFATE 3 ML: 2.5; .5 SOLUTION RESPIRATORY (INHALATION) at 14:05

## 2019-08-14 RX ADMIN — IPRATROPIUM BROMIDE AND ALBUTEROL SULFATE 3 ML: 2.5; .5 SOLUTION RESPIRATORY (INHALATION) at 10:25

## 2019-08-14 RX ADMIN — SODIUM CHLORIDE 2 G: 9 INJECTION, SOLUTION INTRAVENOUS at 04:24

## 2019-08-14 RX ADMIN — OXYCODONE HYDROCHLORIDE AND ACETAMINOPHEN 1 TABLET: 10; 325 TABLET ORAL at 09:07

## 2019-08-14 RX ADMIN — OXYCODONE HYDROCHLORIDE AND ACETAMINOPHEN 1 TABLET: 5; 325 TABLET ORAL at 17:44

## 2019-08-14 NOTE — PLAN OF CARE
Problem: Patient Care Overview  Goal: Plan of Care Review  Outcome: Ongoing (interventions implemented as appropriate)   08/14/19 0544   Coping/Psychosocial   Plan of Care Reviewed With patient   OTHER   Outcome Summary Alert and oriented X 4. NSR 70-80. On fransisca .5. Map greater than 65. Had syncopal episode on ambulation, Dr. Blackburn notified. Denies injury. C/o shoulder pain. Pt on 2l per NC sats 97%, lungs clear. Using incentive, pulling 750. Pt c/o left shoulder pain that radiates to chest. Percocet 10-325mg relieves pain well. KEV output 210 ml. MS output 60ml. Afebrile.     Goal: Individualization and Mutuality  Outcome: Ongoing (interventions implemented as appropriate)    Goal: Discharge Needs Assessment  Outcome: Ongoing (interventions implemented as appropriate)    Goal: Interprofessional Rounds/Family Conf  Outcome: Ongoing (interventions implemented as appropriate)      Problem: Cardiac: ACS (Acute Coronary Syndrome) (Adult)  Goal: Signs and Symptoms of Listed Potential Problems Will be Absent, Minimized or Managed (Cardiac: ACS)  Outcome: Ongoing (interventions implemented as appropriate)      Problem: Fall Risk (Adult)  Goal: Absence of Fall  Outcome: Ongoing (interventions implemented as appropriate)      Problem: Cardiac Surgery (Adult)  Goal: Signs and Symptoms of Listed Potential Problems Will be Absent, Minimized or Managed (Cardiac Surgery)  Outcome: Ongoing (interventions implemented as appropriate)

## 2019-08-14 NOTE — NURSING NOTE
Patient c/o right sided chest pain that radiates to shoulder blade. Patient dysphoretic. Stat EKG performed.

## 2019-08-14 NOTE — SIGNIFICANT NOTE
Around 2200, patient was walking in front of bed #6 with the assist of two lift team members on each side of patient. Lift stated that the patient's eyes rolled in the back of his head and went limp. Lift team had ahold of the patient under the arm on each side, they eased the patient  down to the floor on his knees. For about 5-10 secs patient was not responding to verbal commands/questioning, but then said he was okay. Patient was lifted up to his feet and placed in a wheelchair and placed back in his bed. Dr. Blackburn was notified within 5 minutes of the incident. Labs were ordered. Patient is now resting comfortably in his bed with no complaints. Patient believes he got up too quickly and was walking too quickly. No signs of injury were noted on the patient.

## 2019-08-14 NOTE — PLAN OF CARE
Problem: Patient Care Overview  Goal: Plan of Care Review  Outcome: Ongoing (interventions implemented as appropriate)   08/14/19 0801   Coping/Psychosocial   Plan of Care Reviewed With patient   Plan of Care Review   Progress improving   OTHER   Outcome Summary pt trans to EOB min of 2, performed warm up exercises sitting EOB, sit-stand cga-min of 2, pt amb 75 feet cga-min of 2, trans to chair performed cool down exercises

## 2019-08-14 NOTE — PROGRESS NOTES
"CABG x 6, Right EVH with open extension    POD 2    Overnight events noted. During walk, he had syncopal episode. This was after Neosynephrine was paused. Patient states he was \"trying to walk quick to get back to bed\" Now he is resting in bed comfortably. Back on Adithya at 0.5 mcg/kg/min. Had episode of left chest pain and left shoulder pain this morning, improved after diego drain placed off bulb suction. EKG reviewed with no significant change when compared to initial post op EKG.     IV gtts: Adithya  Telemetry: sinus 70s    Visit Vitals  /68 (BP Location: Right arm, Patient Position: Lying)   Pulse 76   Temp 98.1 °F (36.7 °C) (Oral)   Resp 18   Ht 180.3 cm (70.98\")   Wt 97.2 kg (214 lb 4.8 oz)   SpO2 96%   BMI 29.90 kg/m²     Baseline weight 195 pounds    Intake/Output Summary (Last 24 hours) at 8/14/2019 0759  Last data filed at 8/14/2019 0413  Gross per 24 hour   Intake 2290.2 ml   Output 1315 ml   Net 975.2 ml     MST output: 110 ml/24 hours  L Diego drain: 310 ml/24 hours    Labs:        Chest x ray: bibasilar atelectasis, no pneumothorax    Physical Exam:  General: No apparent distress. In good spirits.  Cardiovascular: Regular rate and rhythm without murmur, rubs, or gallops.    Pulmonary: Clear to auscultation bilaterally without wheezing, rubs, or rales.  Chest: Sternotomy incision clean, dry, and intact. Sternum stable. No clicks. Mediastinal tubes x 2 and diego drain x 1 with dressing clean, dry, and intact.   Abdomen: Soft, non-distended, and non-tender.  Extremities: Warm, moves all extremities. Saphenectomy site clean, dry, and intact.   Neurologic: Grossly intact with no focal deficits.       Impression:  Left main coronary artery disease  Unstable angina   Hypertension  Hypercholesteremia    Plan:  Wean adithya gtt per routine  Encourage pulmonary toilet and ambulation  Routine post cardiac surgery regimen  DW patient and nursing   "

## 2019-08-15 ENCOUNTER — APPOINTMENT (OUTPATIENT)
Dept: GENERAL RADIOLOGY | Facility: HOSPITAL | Age: 74
End: 2019-08-15

## 2019-08-15 LAB
ABO + RH BLD: NORMAL
ABO + RH BLD: NORMAL
ANION GAP SERPL CALCULATED.3IONS-SCNC: 2 MMOL/L (ref 4–13)
BH BB BLOOD EXPIRATION DATE: NORMAL
BH BB BLOOD EXPIRATION DATE: NORMAL
BH BB BLOOD TYPE BARCODE: 5100
BH BB BLOOD TYPE BARCODE: 5100
BH BB DISPENSE STATUS: NORMAL
BH BB DISPENSE STATUS: NORMAL
BH BB PRODUCT CODE: NORMAL
BH BB PRODUCT CODE: NORMAL
BH BB UNIT NUMBER: NORMAL
BH BB UNIT NUMBER: NORMAL
BUN BLD-MCNC: 14 MG/DL (ref 5–21)
BUN/CREAT SERPL: 17.5 (ref 7–25)
CALCIUM SPEC-SCNC: 8 MG/DL (ref 8.4–10.4)
CHLORIDE SERPL-SCNC: 106 MMOL/L (ref 98–110)
CO2 SERPL-SCNC: 28 MMOL/L (ref 24–31)
CREAT BLD-MCNC: 0.8 MG/DL (ref 0.5–1.4)
CROSSMATCH INTERPRETATION: NORMAL
CROSSMATCH INTERPRETATION: NORMAL
DEPRECATED RDW RBC AUTO: 45.8 FL (ref 37–54)
ERYTHROCYTE [DISTWIDTH] IN BLOOD BY AUTOMATED COUNT: 13.5 % (ref 12.3–15.4)
GFR SERPL CREATININE-BSD FRML MDRD: 95 ML/MIN/1.73
GLUCOSE BLD-MCNC: 125 MG/DL (ref 70–100)
GLUCOSE BLDC GLUCOMTR-MCNC: 135 MG/DL (ref 70–130)
HCT VFR BLD AUTO: 20.9 % (ref 37.5–51)
HGB BLD-MCNC: 7.2 G/DL (ref 13–17.7)
MCH RBC QN AUTO: 32.1 PG (ref 26.6–33)
MCHC RBC AUTO-ENTMCNC: 34.4 G/DL (ref 31.5–35.7)
MCV RBC AUTO: 93.3 FL (ref 79–97)
PLATELET # BLD AUTO: 131 10*3/MM3 (ref 140–450)
PMV BLD AUTO: 10.7 FL (ref 6–12)
POTASSIUM BLD-SCNC: 4 MMOL/L (ref 3.5–5.3)
RBC # BLD AUTO: 2.24 10*6/MM3 (ref 4.14–5.8)
SODIUM BLD-SCNC: 136 MMOL/L (ref 135–145)
UNIT  ABO: NORMAL
UNIT  ABO: NORMAL
UNIT  RH: NORMAL
UNIT  RH: NORMAL
WBC NRBC COR # BLD: 6.79 10*3/MM3 (ref 3.4–10.8)

## 2019-08-15 PROCEDURE — 82962 GLUCOSE BLOOD TEST: CPT

## 2019-08-15 PROCEDURE — 25010000002 ENOXAPARIN PER 10 MG: Performed by: THORACIC SURGERY (CARDIOTHORACIC VASCULAR SURGERY)

## 2019-08-15 PROCEDURE — 99024 POSTOP FOLLOW-UP VISIT: CPT | Performed by: NURSE PRACTITIONER

## 2019-08-15 PROCEDURE — 71046 X-RAY EXAM CHEST 2 VIEWS: CPT

## 2019-08-15 PROCEDURE — 97116 GAIT TRAINING THERAPY: CPT

## 2019-08-15 PROCEDURE — 94799 UNLISTED PULMONARY SVC/PX: CPT

## 2019-08-15 PROCEDURE — 94760 N-INVAS EAR/PLS OXIMETRY 1: CPT

## 2019-08-15 PROCEDURE — 85027 COMPLETE CBC AUTOMATED: CPT | Performed by: THORACIC SURGERY (CARDIOTHORACIC VASCULAR SURGERY)

## 2019-08-15 PROCEDURE — 80048 BASIC METABOLIC PNL TOTAL CA: CPT | Performed by: THORACIC SURGERY (CARDIOTHORACIC VASCULAR SURGERY)

## 2019-08-15 PROCEDURE — 25010000002 FUROSEMIDE PER 20 MG: Performed by: THORACIC SURGERY (CARDIOTHORACIC VASCULAR SURGERY)

## 2019-08-15 RX ORDER — FUROSEMIDE 10 MG/ML
20 INJECTION INTRAMUSCULAR; INTRAVENOUS ONCE
Status: COMPLETED | OUTPATIENT
Start: 2019-08-15 | End: 2019-08-15

## 2019-08-15 RX ORDER — IPRATROPIUM BROMIDE AND ALBUTEROL SULFATE 2.5; .5 MG/3ML; MG/3ML
3 SOLUTION RESPIRATORY (INHALATION)
Status: DISCONTINUED | OUTPATIENT
Start: 2019-08-15 | End: 2019-08-19 | Stop reason: HOSPADM

## 2019-08-15 RX ADMIN — OXYCODONE HYDROCHLORIDE AND ACETAMINOPHEN 1 TABLET: 10; 325 TABLET ORAL at 07:21

## 2019-08-15 RX ADMIN — LIDOCAINE 2 PATCH: 50 PATCH TOPICAL at 08:58

## 2019-08-15 RX ADMIN — FUROSEMIDE 20 MG: 10 INJECTION, SOLUTION INTRAMUSCULAR; INTRAVENOUS at 06:36

## 2019-08-15 RX ADMIN — IPRATROPIUM BROMIDE AND ALBUTEROL SULFATE 3 ML: 2.5; .5 SOLUTION RESPIRATORY (INHALATION) at 14:18

## 2019-08-15 RX ADMIN — CHLORHEXIDINE GLUCONATE 15 ML: 1.2 RINSE ORAL at 06:36

## 2019-08-15 RX ADMIN — METOPROLOL TARTRATE 12.5 MG: 25 TABLET, FILM COATED ORAL at 08:56

## 2019-08-15 RX ADMIN — AMIODARONE HYDROCHLORIDE 400 MG: 200 TABLET ORAL at 21:06

## 2019-08-15 RX ADMIN — IPRATROPIUM BROMIDE AND ALBUTEROL SULFATE 3 ML: 2.5; .5 SOLUTION RESPIRATORY (INHALATION) at 06:15

## 2019-08-15 RX ADMIN — IPRATROPIUM BROMIDE AND ALBUTEROL SULFATE 3 ML: 2.5; .5 SOLUTION RESPIRATORY (INHALATION) at 10:13

## 2019-08-15 RX ADMIN — BISACODYL 10 MG: 5 TABLET ORAL at 08:57

## 2019-08-15 RX ADMIN — IPRATROPIUM BROMIDE AND ALBUTEROL SULFATE 3 ML: 2.5; .5 SOLUTION RESPIRATORY (INHALATION) at 20:25

## 2019-08-15 RX ADMIN — POLYETHYLENE GLYCOL (3350) 17 G: 17 POWDER, FOR SOLUTION ORAL at 10:41

## 2019-08-15 RX ADMIN — METOPROLOL TARTRATE 12.5 MG: 25 TABLET, FILM COATED ORAL at 21:05

## 2019-08-15 RX ADMIN — BISACODYL 10 MG: 5 TABLET ORAL at 21:05

## 2019-08-15 RX ADMIN — ENOXAPARIN SODIUM 30 MG: 30 INJECTION SUBCUTANEOUS at 08:57

## 2019-08-15 RX ADMIN — OXYCODONE HYDROCHLORIDE AND ACETAMINOPHEN 1 TABLET: 10; 325 TABLET ORAL at 10:55

## 2019-08-15 RX ADMIN — ENOXAPARIN SODIUM 30 MG: 30 INJECTION SUBCUTANEOUS at 21:06

## 2019-08-15 RX ADMIN — ATORVASTATIN CALCIUM 20 MG: 10 TABLET, FILM COATED ORAL at 21:05

## 2019-08-15 RX ADMIN — ASPIRIN 81 MG: 81 TABLET ORAL at 08:57

## 2019-08-15 RX ADMIN — AMIODARONE HYDROCHLORIDE 400 MG: 200 TABLET ORAL at 08:57

## 2019-08-15 RX ADMIN — CLOPIDOGREL BISULFATE 75 MG: 75 TABLET, FILM COATED ORAL at 17:47

## 2019-08-15 NOTE — PLAN OF CARE
Problem: Patient Care Overview  Goal: Plan of Care Review  Outcome: Ongoing (interventions implemented as appropriate)   08/15/19 8243   Coping/Psychosocial   Plan of Care Reviewed With patient   Plan of Care Review   Progress no change   OTHER   Outcome Summary VSS THIS SHIFT. PATIENT HAS NO COMPLAINTS OF PAIN. PATIENT HAS GOOD AMOUNT OF DRAINAGE FROM KEV DRAIN. PATIENT HAS A DECENT APPETITE.  PT WALKED THE HALLS WITH SOME COMPLAINTS OF SOA, NO OXYGEN REQUIRED.  CURRENTLY OFF OF OXYGEN. IS ~ 1000. SITTING UP IN CHAIR WITH LEGS ELEVATED. WIRES TAPED AND ISOLATED. SAFETY MAINTAINED, NO FALLS. WILL CONT TO MONITOR AND NOTIFY MD OF ANY CHANGES.

## 2019-08-15 NOTE — PLAN OF CARE
Problem: Patient Care Overview  Goal: Plan of Care Review  Outcome: Ongoing (interventions implemented as appropriate)   08/15/19 0855   Coping/Psychosocial   Plan of Care Reviewed With patient   Plan of Care Review   Progress improving   OTHER   Outcome Summary pt in chair,performed warm up exercises, sit-stand cga 2, pt amb 200 feet cga, trans back to chair, cool down exercises

## 2019-08-15 NOTE — PROGRESS NOTES
"CABG x 6, Right EVH with open extension    POD 3    Up in the chair. Neosynephrine has been off since last night. On 1 liter nasal cannula. IS 1000. Left sided chest pain improved with diego drain bulb off suction. Weight up 20 pounds    Telemetry: sinus 80s    Visit Vitals  /62   Pulse 83   Temp 98.8 °F (37.1 °C)   Resp 16   Ht 180.3 cm (70.98\")   Wt 97.6 kg (215 lb 3.2 oz)   SpO2 94%   BMI 30.03 kg/m²     Baseline weight 195 pounds    Intake/Output Summary (Last 24 hours) at 8/15/2019 0934  Last data filed at 8/15/2019 0800  Gross per 24 hour   Intake 1213.4 ml   Output 1235 ml   Net -21.6 ml     MST output: 90 ml/24 hours  L Diego drain: 185 ml/24 hours    Labs:      Chest x ray: bibasilar atelectasis, no pneumothorax, left pleural effusion, interstitial changes    Physical Exam:  General: No apparent distress. In good spirits. Up in the chair.   Cardiovascular: Regular rate and rhythm without murmur, rubs, or gallops.    Pulmonary: Clear to auscultation bilaterally without wheezing, rubs, or rales.  Chest: Sternotomy incision clean, dry, and intact. Sternum stable. No clicks. Mediastinal tubes x 2 and diego drain x 1 with dressing clean, dry, and intact.   Abdomen: Soft, non-distended, and non-tender.  Extremities: Warm, moves all extremities. Saphenectomy site clean, dry, and intact.   Neurologic: Grossly intact with no focal deficits.       Impression:  Left main coronary artery disease  Unstable angina   Hypertension  Hypercholesteremia  Anemia, hemo dilutional     Plan:  Mediastinal tubes removed. Keep diego drain.   Diurese  Encourage pulmonary toilet and ambulation  Routine post cardiac surgery regimen  DW patient, nursing, and family  Transfer to    "

## 2019-08-15 NOTE — PLAN OF CARE
Problem: Patient Care Overview  Goal: Plan of Care Review  Outcome: Ongoing (interventions implemented as appropriate)   08/15/19 0333   Coping/Psychosocial   Plan of Care Reviewed With patient   Plan of Care Review   Progress improving   OTHER   Outcome Summary Alert and oriented X4. Normal sinus 80's. Lungs CTA. C/o constipation, reports flatus. Bowel sounds active. Worthy cath remains in place. Reports pain, improves with percocet. Was able to come off fransisca, BP stable.     Goal: Individualization and Mutuality  Outcome: Ongoing (interventions implemented as appropriate)    Goal: Discharge Needs Assessment  Outcome: Ongoing (interventions implemented as appropriate)    Goal: Interprofessional Rounds/Family Conf  Outcome: Ongoing (interventions implemented as appropriate)      Problem: Fall Risk (Adult)  Goal: Absence of Fall  Outcome: Ongoing (interventions implemented as appropriate)      Problem: Cardiac Surgery (Adult)  Goal: Signs and Symptoms of Listed Potential Problems Will be Absent, Minimized or Managed (Cardiac Surgery)  Outcome: Ongoing (interventions implemented as appropriate)

## 2019-08-16 LAB
ANION GAP SERPL CALCULATED.3IONS-SCNC: 4 MMOL/L (ref 4–13)
BUN BLD-MCNC: 13 MG/DL (ref 5–21)
BUN/CREAT SERPL: 14.4 (ref 7–25)
CALCIUM SPEC-SCNC: 8 MG/DL (ref 8.4–10.4)
CHLORIDE SERPL-SCNC: 103 MMOL/L (ref 98–110)
CO2 SERPL-SCNC: 30 MMOL/L (ref 24–31)
CREAT BLD-MCNC: 0.9 MG/DL (ref 0.5–1.4)
DEPRECATED RDW RBC AUTO: 45.7 FL (ref 37–54)
ERYTHROCYTE [DISTWIDTH] IN BLOOD BY AUTOMATED COUNT: 13.2 % (ref 12.3–15.4)
GFR SERPL CREATININE-BSD FRML MDRD: 83 ML/MIN/1.73
GLUCOSE BLD-MCNC: 112 MG/DL (ref 70–100)
HCT VFR BLD AUTO: 21.3 % (ref 37.5–51)
HGB BLD-MCNC: 7.3 G/DL (ref 13–17.7)
MCH RBC QN AUTO: 32 PG (ref 26.6–33)
MCHC RBC AUTO-ENTMCNC: 34.3 G/DL (ref 31.5–35.7)
MCV RBC AUTO: 93.4 FL (ref 79–97)
PLATELET # BLD AUTO: 163 10*3/MM3 (ref 140–450)
PMV BLD AUTO: 10.9 FL (ref 6–12)
POTASSIUM BLD-SCNC: 3.8 MMOL/L (ref 3.5–5.3)
RBC # BLD AUTO: 2.28 10*6/MM3 (ref 4.14–5.8)
SODIUM BLD-SCNC: 137 MMOL/L (ref 135–145)
WBC NRBC COR # BLD: 6.79 10*3/MM3 (ref 3.4–10.8)

## 2019-08-16 PROCEDURE — 94760 N-INVAS EAR/PLS OXIMETRY 1: CPT

## 2019-08-16 PROCEDURE — 80048 BASIC METABOLIC PNL TOTAL CA: CPT | Performed by: NURSE PRACTITIONER

## 2019-08-16 PROCEDURE — 97116 GAIT TRAINING THERAPY: CPT

## 2019-08-16 PROCEDURE — 94799 UNLISTED PULMONARY SVC/PX: CPT

## 2019-08-16 PROCEDURE — 25010000002 ENOXAPARIN PER 10 MG: Performed by: THORACIC SURGERY (CARDIOTHORACIC VASCULAR SURGERY)

## 2019-08-16 PROCEDURE — 99024 POSTOP FOLLOW-UP VISIT: CPT | Performed by: NURSE PRACTITIONER

## 2019-08-16 PROCEDURE — 85027 COMPLETE CBC AUTOMATED: CPT | Performed by: NURSE PRACTITIONER

## 2019-08-16 RX ADMIN — METOPROLOL TARTRATE 12.5 MG: 25 TABLET, FILM COATED ORAL at 09:23

## 2019-08-16 RX ADMIN — ENOXAPARIN SODIUM 30 MG: 30 INJECTION SUBCUTANEOUS at 09:24

## 2019-08-16 RX ADMIN — AMIODARONE HYDROCHLORIDE 400 MG: 200 TABLET ORAL at 20:01

## 2019-08-16 RX ADMIN — METOPROLOL TARTRATE 12.5 MG: 25 TABLET, FILM COATED ORAL at 20:01

## 2019-08-16 RX ADMIN — ASPIRIN 81 MG: 81 TABLET ORAL at 09:23

## 2019-08-16 RX ADMIN — IPRATROPIUM BROMIDE AND ALBUTEROL SULFATE 3 ML: 2.5; .5 SOLUTION RESPIRATORY (INHALATION) at 07:20

## 2019-08-16 RX ADMIN — CLOPIDOGREL BISULFATE 75 MG: 75 TABLET, FILM COATED ORAL at 17:36

## 2019-08-16 RX ADMIN — ATORVASTATIN CALCIUM 20 MG: 10 TABLET, FILM COATED ORAL at 20:02

## 2019-08-16 RX ADMIN — IPRATROPIUM BROMIDE AND ALBUTEROL SULFATE 3 ML: 2.5; .5 SOLUTION RESPIRATORY (INHALATION) at 15:27

## 2019-08-16 RX ADMIN — OXYCODONE HYDROCHLORIDE AND ACETAMINOPHEN 1 TABLET: 5; 325 TABLET ORAL at 15:22

## 2019-08-16 RX ADMIN — AMIODARONE HYDROCHLORIDE 400 MG: 200 TABLET ORAL at 09:24

## 2019-08-16 RX ADMIN — POLYETHYLENE GLYCOL (3350) 17 G: 17 POWDER, FOR SOLUTION ORAL at 09:23

## 2019-08-16 RX ADMIN — LIDOCAINE 2 PATCH: 50 PATCH TOPICAL at 09:23

## 2019-08-16 RX ADMIN — BISACODYL 10 MG: 5 TABLET ORAL at 09:23

## 2019-08-16 RX ADMIN — BISACODYL 10 MG: 5 TABLET ORAL at 20:01

## 2019-08-16 RX ADMIN — IPRATROPIUM BROMIDE AND ALBUTEROL SULFATE 3 ML: 2.5; .5 SOLUTION RESPIRATORY (INHALATION) at 19:29

## 2019-08-16 RX ADMIN — ENOXAPARIN SODIUM 30 MG: 30 INJECTION SUBCUTANEOUS at 20:03

## 2019-08-16 NOTE — PLAN OF CARE
Problem: Patient Care Overview  Goal: Plan of Care Review  Outcome: Ongoing (interventions implemented as appropriate)   08/16/19 0904   Coping/Psychosocial   Plan of Care Reviewed With patient   Plan of Care Review   Progress no change   OTHER   Outcome Summary Pt. agreeable to therapy. Pt. was CGA for transfers and gait. Pt. continues to walk with a slow gait and decreased step length. Pt. only walked 175'. It took some questioning of the pt of why he walked less. He finally stated he was tired. Explained the importance of progressive ambulation for his heart and endurance. WIll continue to progress as able.

## 2019-08-16 NOTE — PROGRESS NOTES
"CABG x 6, Right EVH with open extension    POD 4    Up in the chair. On room air. (-) BM. Only walked 175 feet this morning. Weight 4 pounds.     Telemetry: sinus 60-70s    Visit Vitals  /49 (BP Location: Right arm, Patient Position: Sitting)   Pulse 69   Temp 99.6 °F (37.6 °C) (Oral)   Resp 16   Ht 180.3 cm (70.98\")   Wt 95.9 kg (211 lb 6 oz)   SpO2 96%   BMI 29.49 kg/m²     Baseline weight 195 pounds    Intake/Output Summary (Last 24 hours) at 8/16/2019 1228  Last data filed at 8/16/2019 1121  Gross per 24 hour   Intake 840 ml   Output 1300 ml   Net -460 ml     L Diego drain: 295 ml/24 hours    Labs:      Physical Exam:  General: No apparent distress. In good spirits. Up in the chair.   Cardiovascular: Regular rate and rhythm without murmur, rubs, or gallops.    Pulmonary: Clear to auscultation bilaterally without wheezing, rubs, or rales.  Chest: Sternotomy incision clean, dry, and intact. Sternum stable. No clicks. Diego drain x 1 with dressing clean, dry, and intact.   Abdomen: Soft, non-distended, and non-tender.  Extremities: Warm, moves all extremities. Saphenectomy site clean, dry, and intact.   Neurologic: Grossly intact with no focal deficits.       Impression:  Left main coronary artery disease  Unstable angina   Hypertension  Hypercholesteremia  Anemia, hemo dilutional    Plan:  Continue diuresis, keep diego drain.   Enema   Encourage pulmonary toilet and ambulation  Routine post cardiac surgery regimen  DW patient, nursing, and family    "

## 2019-08-16 NOTE — PLAN OF CARE
Problem: Patient Care Overview  Goal: Plan of Care Review  Outcome: Ongoing (interventions implemented as appropriate)   08/16/19 0510   Coping/Psychosocial   Plan of Care Reviewed With patient   Plan of Care Review   Progress improving   OTHER   Outcome Summary Pt continues to improve and urine output also improving. Ambulated pt 50 ft in hallway, pt tolerated well. VSS with no complaint of pain. pt rested well through the night.     Goal: Interprofessional Rounds/Family Conf  Outcome: Ongoing (interventions implemented as appropriate)      Problem: Cardiac: ACS (Acute Coronary Syndrome) (Adult)  Goal: Signs and Symptoms of Listed Potential Problems Will be Absent, Minimized or Managed (Cardiac: ACS)  Outcome: Ongoing (interventions implemented as appropriate)      Problem: Fall Risk (Adult)  Goal: Absence of Fall  Outcome: Ongoing (interventions implemented as appropriate)      Problem: Cardiac Surgery (Adult)  Goal: Signs and Symptoms of Listed Potential Problems Will be Absent, Minimized or Managed (Cardiac Surgery)  Outcome: Ongoing (interventions implemented as appropriate)

## 2019-08-16 NOTE — PLAN OF CARE
Problem: Patient Care Overview  Goal: Plan of Care Review  Outcome: Ongoing (interventions implemented as appropriate)   08/16/19 0322   Coping/Psychosocial   Plan of Care Reviewed With patient   Plan of Care Review   Progress no change   OTHER   Outcome Summary VSS. Patient has been very grouchy today. Walked 2X today, refused to walk with PT because he was waiting for an enema. Second walk was very short because he wanted to get back in the bed. Gave the patient an enema, very small BM. Patient is refusing to sit in the chair, currently laying in the bed. PRN pain medication given. KEV drain has came loose three times even though it is taped and safety clipped to his gown. Will cont to monitor.

## 2019-08-16 NOTE — PROGRESS NOTES
Continued Stay Note  SANTOS Sandoval     Patient Name: Piter Ro  MRN: [id number]  Today's Date: 8/16/2019    Admit Date: 8/8/2019    Discharge Plan     Row Name 08/16/19 1244       Plan    Plan  Home with spouse-    Patient/Family in Agreement with Plan  yes    Plan Comments  SS continuing to follow. Patient has done well with therapy and plans to return home with no discharge planning needs.                 ANATOLY MedinaW

## 2019-08-16 NOTE — THERAPY TREATMENT NOTE
Acute Care - Physical Therapy Treatment Note  [facility]     Patient Name: Piter Ro  : 1945  MRN: [id number]  Today's Date: 2019  Onset of Illness/Injury or Date of Surgery: 19  Date of Referral to PT: 19  Referring Physician: Dr. Blackburn    Admit Date: 2019    Visit Dx:    ICD-10-CM ICD-9-CM   1. Chest pain, unspecified type R07.9 786.50   2. Left main coronary artery disease I25.10 414.00   3. Impaired mobility Z74.09 799.89     Patient Active Problem List   Diagnosis   • Chest pain   • Left main coronary artery disease   • Unstable angina (CMS/HCC)   • Hypertension   • Hypercholesteremia       Therapy Treatment    Rehabilitation Treatment Summary     Row Name 19             Treatment Time/Intention    Discipline  physical therapy assistant  -MF      Document Type  therapy note (daily note)  -MF      Subjective Information  complains of;pain;fatigue  -MF      Mode of Treatment  physical therapy  -MF      Existing Precautions/Restrictions  fall;sternal KEV drain  -MF      Recorded by [MF] Faye Carcamo, [facility] 19      Row Name 19             Vital Signs    Pretreatment Heart Rate (beats/min)  71  -MF      Pre SpO2 (%)  95  -MF      O2 Delivery Pre Treatment  supplemental O2 2L  -MF      O2 Delivery Intra Treatment  room air  -MF      Post SpO2 (%)  92  -MF      O2 Delivery Post Treatment  room air  -MF      Pre Patient Position  Sitting  -MF      Intra Patient Position  Standing  -MF      Post Patient Position  Sitting  -MF      Recorded by [MF] Faye Carcamo, PTA 19      Row Name 19             Bed Mobility Assessment/Treatment    Comment (Bed Mobility)  up in chair  -MF      Recorded by [MF] Faye Carcamo, PTA 19      Row Name 19             Transfer Assessment/Treatment    Comment (Transfers)  stood x 2 due to KEV drain falling off when he stood the first time.   -MF      Recorded by  [MF] Faye Carcamo, [facility] 08/16/19 0926      Row Name 08/16/19 0904             Sit-Stand Transfer    Sit-Stand Major (Transfers)  contact guard  -MF      Recorded by [MF] Faye Carcamo, [facility] 08/16/19 0926      Row Name 08/16/19 0904             Stand-Sit Transfer    Stand-Sit Major (Transfers)  contact guard  -MF      Recorded by [] Faye Carcamo, [facility] 08/16/19 0926      Row Name 08/16/19 0904             Gait/Stairs Assessment/Training    Major Level (Gait)  contact guard  -MF      Distance in Feet (Gait)  175  -MF      Deviations/Abnormal Patterns (Gait)  stride length decreased;joselito decreased;base of support, narrow  -MF      Bilateral Gait Deviations  forward flexed posture  -MF      Comment (Gait/Stairs)  pt declined to walk further. Pt. would not tell PTA how he felt. Once back in room he stated he was just lazy. Instructed pt. on the importance of progressive ambulation for his heart and endurance.   -MF      Recorded by [MF] Faye Carcamo, [facility] 08/16/19 0926      Row Name 08/16/19 0904             Therapeutic Exercise    Comment (Therapeutic Exercise)  [facility] protocol x 20 reps  -MF      Recorded by [MF] Faye Carcamo, [facility] 08/16/19 0926      Row Name 08/16/19 0904             Positioning and Restraints    Pre-Treatment Position  sitting in chair/recliner  -MF      Post Treatment Position  chair  -MF      In Chair  reclined;call light within reach;with family/caregiver;with nsg  -MF      Recorded by [MF] Faye Carcamo, [facility] 08/16/19 0926      Row Name 08/16/19 0904             Pain Scale: Numbers Pre/Post-Treatment    Pain Scale: Numbers, Pretreatment  2/10  -MF      Pain Scale: Numbers, Post-Treatment  3/10  -MF      Pain Location  chest  -      Pain Intervention(s)  Repositioned  -MF      Recorded by [MF] Faye Carcamo, [facility] 08/16/19 0926      Row Name                Wound 08/12/19 1312 chest Incision    Wound - Properties Group Date first  assessed: 08/12/19 [TM] Time first assessed: 1312 [TM] Location: chest [TM] Primary Wound Type: Incision [TM] Recorded by:  [TM] iWnnie Nagy RN 08/12/19 1312    Row Name                Wound 08/12/19 1312 Right leg Incision    Wound - Properties Group Date first assessed: 08/12/19 [TM] Time first assessed: 1312 [TM] Side: Right [TM] Location: leg [TM] Primary Wound Type: Incision [TM] Recorded by:  [TM] Winnie Nagy RN 08/12/19 1312      User Key  (r) = Recorded By, (t) = Taken By, (c) = Cosigned By    Initials Name Effective Dates Discipline     Faye Carcmao, FRANKI 08/02/16 -  PT    TM Winnie Nagy RN 07/26/16 -  Nurse          Wound 08/12/19 1312 chest Incision (Active)   Dressing Appearance open to air 8/15/2019  7:00 PM   Closure Liquid skin adhesive 8/15/2019  7:00 PM   Base clean;dry;pink 8/15/2019 11:25 AM   Periwound dry;intact 8/15/2019  7:00 PM   Periwound Temperature warm 8/15/2019  7:00 PM   Periwound Skin Turgor soft 8/15/2019  7:00 PM   Edges rolled/closed 8/15/2019  7:00 PM   Drainage Amount none 8/15/2019  7:00 PM       Wound 08/12/19 1312 Right leg Incision (Active)   Dressing Appearance open to air 8/15/2019  7:00 PM   Closure Liquid skin adhesive 8/15/2019  7:00 PM   Base clean;dry 8/15/2019  7:00 PM   Periwound intact;dry 8/15/2019  7:00 PM   Periwound Temperature warm 8/15/2019  7:00 PM   Periwound Skin Turgor soft 8/15/2019  7:00 PM   Edges rolled/closed 8/15/2019  7:00 PM   Drainage Amount none 8/15/2019  7:00 PM   Dressing Care, Wound dressing reinforced 8/15/2019 11:25 AM           Physical Therapy Education     Title: PT OT SLP Therapies (Done)     Topic: Physical Therapy (Done)     Point: Mobility training (Done)     Learning Progress Summary           Patient Acceptance, E,TB, VU by  at 8/14/2019  8:58 AM    Comment:  bed mobility    Acceptance, E, VU by SB at 8/13/2019 11:54 AM    Comment:  pt edu on POC, benefits of act, d/c plans, warm up/cool down  exercises and sternal precautions                   Point: Home exercise program (Done)     Learning Progress Summary           Patient Acceptance, E, VU by  at 8/13/2019 11:54 AM    Comment:  pt edu on POC, benefits of act, d/c plans, warm up/cool down exercises and sternal precautions                   Point: Body mechanics (Done)     Learning Progress Summary           Patient Acceptance, E, VU by SB at 8/13/2019 11:54 AM    Comment:  pt edu on POC, benefits of act, d/c plans, warm up/cool down exercises and sternal precautions                   Point: Precautions (Done)     Learning Progress Summary           Patient Acceptance, E,TB, VU by  at 8/15/2019  8:52 AM    Comment:  trans    Acceptance, E, VU by  at 8/13/2019 11:54 AM    Comment:  pt edu on POC, benefits of act, d/c plans, warm up/cool down exercises and sternal precautions                               User Key     Initials Effective Dates Name Provider Type Discipline     08/02/16 -  Clarisa Chavarria PTA Physical Therapy Assistant PT     08/31/18 -  Jocelynn Robledo PT Physical Therapist PT                PT Recommendation and Plan     Plan of Care Reviewed With: patient  Progress: no change  Outcome Summary: Pt. agreeable to therapy. Pt. was CGA for transfers and gait. Pt. continues to walk with a slow gait and decreased step length. Pt. only walked 175'. It took some questioning of the pt of why he walked less. He finally stated he was tired. Explained the importance of progressive ambulation for his heart and endurance. WIll continue to progress as able.   Outcome Measures     Row Name 08/15/19 0800 08/14/19 0900 08/13/19 1100       How much help from another person do you currently need...    Turning from your back to your side while in flat bed without using bedrails?  3  -AH  3  -AH  3  -SB    Moving from lying on back to sitting on the side of a flat bed without bedrails?  3  -  3  -AH  2  -SB    Moving to and from a bed to a chair  (including a wheelchair)?  3  -  3  -AH  3  -SB    Standing up from a chair using your arms (e.g., wheelchair, bedside chair)?  3  -  3  -AH  3  -SB    Climbing 3-5 steps with a railing?  2  -  2  -AH  2  -SB    To walk in hospital room?  3  -AH  3  -AH  3  -SB    AM-PAC 6 Clicks Score (PT)  17  -  17  -  16  -SB       Functional Assessment    Outcome Measure Options  AM-PAC 6 Clicks Basic Mobility (PT)  -  AM-PAC 6 Clicks Basic Mobility (PT)  -AH  AM-PAC 6 Clicks Basic Mobility (PT)  -SB      User Key  (r) = Recorded By, (t) = Taken By, (c) = Cosigned By    Initials Name Provider Type     Clarisa Chavarria PTA Physical Therapy Assistant    Jocelynn Hernandez, PT Physical Therapist         Time Calculation:   PT Charges     Row Name 08/16/19 0929             Time Calculation    Start Time  0904  -      Stop Time  0929  -      Time Calculation (min)  25 min  -      PT Received On  08/16/19  -      PT Goal Re-Cert Due Date  08/23/19  -         Time Calculation- PT    Total Timed Code Minutes- PT  25 minute(s)  -         Timed Charges    23977 - Gait Training Minutes   25  -MF        User Key  (r) = Recorded By, (t) = Taken By, (c) = Cosigned By    Initials Name Provider Type    Faye Guidry PTA Physical Therapy Assistant        Therapy Charges for Today     Code Description Service Date Service Provider Modifiers Qty    [id number] HC GAIT TRAINING EA 15 MIN 8/16/2019 Faye Carcamo PTA GP 2          PT G-Codes  Outcome Measure Options: AM-PAC 6 Clicks Basic Mobility (PT)  AM-PAC 6 Clicks Score (PT): 17    Faye Carcamo PTA  8/16/2019

## 2019-08-17 LAB
ANION GAP SERPL CALCULATED.3IONS-SCNC: 8 MMOL/L (ref 4–13)
BUN BLD-MCNC: 13 MG/DL (ref 5–21)
BUN/CREAT SERPL: 14.9 (ref 7–25)
CALCIUM SPEC-SCNC: 8.3 MG/DL (ref 8.4–10.4)
CHLORIDE SERPL-SCNC: 105 MMOL/L (ref 98–110)
CO2 SERPL-SCNC: 27 MMOL/L (ref 24–31)
CREAT BLD-MCNC: 0.87 MG/DL (ref 0.5–1.4)
DEPRECATED RDW RBC AUTO: 45.2 FL (ref 37–54)
ERYTHROCYTE [DISTWIDTH] IN BLOOD BY AUTOMATED COUNT: 13.2 % (ref 12.3–15.4)
GFR SERPL CREATININE-BSD FRML MDRD: 86 ML/MIN/1.73
GLUCOSE BLD-MCNC: 105 MG/DL (ref 70–100)
HCT VFR BLD AUTO: 23.4 % (ref 37.5–51)
HGB BLD-MCNC: 7.9 G/DL (ref 13–17.7)
MCH RBC QN AUTO: 31.5 PG (ref 26.6–33)
MCHC RBC AUTO-ENTMCNC: 33.8 G/DL (ref 31.5–35.7)
MCV RBC AUTO: 93.2 FL (ref 79–97)
PLATELET # BLD AUTO: 246 10*3/MM3 (ref 140–450)
PMV BLD AUTO: 10.4 FL (ref 6–12)
POTASSIUM BLD-SCNC: 4.1 MMOL/L (ref 3.5–5.3)
RBC # BLD AUTO: 2.51 10*6/MM3 (ref 4.14–5.8)
SODIUM BLD-SCNC: 140 MMOL/L (ref 135–145)
WBC NRBC COR # BLD: 6.67 10*3/MM3 (ref 3.4–10.8)

## 2019-08-17 PROCEDURE — 94799 UNLISTED PULMONARY SVC/PX: CPT

## 2019-08-17 PROCEDURE — 25010000002 FUROSEMIDE PER 20 MG: Performed by: THORACIC SURGERY (CARDIOTHORACIC VASCULAR SURGERY)

## 2019-08-17 PROCEDURE — 97116 GAIT TRAINING THERAPY: CPT

## 2019-08-17 PROCEDURE — 94760 N-INVAS EAR/PLS OXIMETRY 1: CPT

## 2019-08-17 PROCEDURE — 25010000002 ENOXAPARIN PER 10 MG: Performed by: THORACIC SURGERY (CARDIOTHORACIC VASCULAR SURGERY)

## 2019-08-17 PROCEDURE — 85027 COMPLETE CBC AUTOMATED: CPT | Performed by: NURSE PRACTITIONER

## 2019-08-17 PROCEDURE — 80048 BASIC METABOLIC PNL TOTAL CA: CPT | Performed by: NURSE PRACTITIONER

## 2019-08-17 PROCEDURE — 99024 POSTOP FOLLOW-UP VISIT: CPT | Performed by: THORACIC SURGERY (CARDIOTHORACIC VASCULAR SURGERY)

## 2019-08-17 RX ORDER — FUROSEMIDE 10 MG/ML
20 INJECTION INTRAMUSCULAR; INTRAVENOUS DAILY
Status: DISCONTINUED | OUTPATIENT
Start: 2019-08-17 | End: 2019-08-19 | Stop reason: HOSPADM

## 2019-08-17 RX ORDER — POTASSIUM CHLORIDE 750 MG/1
40 CAPSULE, EXTENDED RELEASE ORAL DAILY
Status: DISCONTINUED | OUTPATIENT
Start: 2019-08-17 | End: 2019-08-19 | Stop reason: HOSPADM

## 2019-08-17 RX ADMIN — LIDOCAINE 2 PATCH: 50 PATCH TOPICAL at 08:56

## 2019-08-17 RX ADMIN — ATORVASTATIN CALCIUM 20 MG: 10 TABLET, FILM COATED ORAL at 20:10

## 2019-08-17 RX ADMIN — IPRATROPIUM BROMIDE AND ALBUTEROL SULFATE 3 ML: 2.5; .5 SOLUTION RESPIRATORY (INHALATION) at 06:40

## 2019-08-17 RX ADMIN — AMIODARONE HYDROCHLORIDE 400 MG: 200 TABLET ORAL at 08:56

## 2019-08-17 RX ADMIN — ENOXAPARIN SODIUM 30 MG: 30 INJECTION SUBCUTANEOUS at 08:57

## 2019-08-17 RX ADMIN — FUROSEMIDE 20 MG: 10 INJECTION, SOLUTION INTRAMUSCULAR; INTRAVENOUS at 15:05

## 2019-08-17 RX ADMIN — ENOXAPARIN SODIUM 30 MG: 30 INJECTION SUBCUTANEOUS at 20:12

## 2019-08-17 RX ADMIN — AMIODARONE HYDROCHLORIDE 400 MG: 200 TABLET ORAL at 20:10

## 2019-08-17 RX ADMIN — IPRATROPIUM BROMIDE AND ALBUTEROL SULFATE 3 ML: 2.5; .5 SOLUTION RESPIRATORY (INHALATION) at 14:24

## 2019-08-17 RX ADMIN — POTASSIUM CHLORIDE 40 MEQ: 750 CAPSULE, EXTENDED RELEASE ORAL at 15:05

## 2019-08-17 RX ADMIN — METOPROLOL TARTRATE 12.5 MG: 25 TABLET, FILM COATED ORAL at 08:56

## 2019-08-17 RX ADMIN — CLOPIDOGREL BISULFATE 75 MG: 75 TABLET, FILM COATED ORAL at 17:41

## 2019-08-17 RX ADMIN — IPRATROPIUM BROMIDE AND ALBUTEROL SULFATE 3 ML: 2.5; .5 SOLUTION RESPIRATORY (INHALATION) at 19:25

## 2019-08-17 RX ADMIN — ASPIRIN 81 MG: 81 TABLET ORAL at 08:56

## 2019-08-17 RX ADMIN — METOPROLOL TARTRATE 12.5 MG: 25 TABLET, FILM COATED ORAL at 20:10

## 2019-08-17 NOTE — PLAN OF CARE
Problem: Patient Care Overview  Goal: Plan of Care Review  Outcome: Ongoing (interventions implemented as appropriate)   08/17/19 1530   Coping/Psychosocial   Plan of Care Reviewed With patient   Plan of Care Review   Progress improving   OTHER   Outcome Summary VSS. No complaints of pain. IV lasix added. Ambulated X4 with no complaints of pain or SOA. Wires taped and isolated. Incision CDI. IS ~1500. KEV drain still draining a fair amount. Patient had multiple BM this shift. Safety maintained, no falls. Will cont to monitor.

## 2019-08-17 NOTE — THERAPY TREATMENT NOTE
Acute Care - Physical Therapy Treatment Note  [facility]     Patient Name: Piter Ro  : 1945  MRN: [id number]  Today's Date: 2019  Onset of Illness/Injury or Date of Surgery: 19  Date of Referral to PT: 19  Referring Physician: Dr. Blackburn    Admit Date: 2019    Visit Dx:    ICD-10-CM ICD-9-CM   1. Chest pain, unspecified type R07.9 786.50   2. Left main coronary artery disease I25.10 414.00   3. Impaired mobility Z74.09 799.89     Patient Active Problem List   Diagnosis   • Chest pain   • Left main coronary artery disease   • Unstable angina (CMS/HCC)   • Hypertension   • Hypercholesteremia       Therapy Treatment    Rehabilitation Treatment Summary     Row Name 19 0957             Treatment Time/Intention    Discipline  physical therapy assistant  -NW      Document Type  therapy note (daily note)  -NW      Subjective Information  complains of;pain  -NW2      Patient/Family Observations  wife present  -NW2      Patient Effort  good  -NW2      Comment  annette drain  -NW2      Existing Precautions/Restrictions  cardiac;fall;sternal  -NW2      Recorded by [NW] Ling Dodge, PTA 19 0957  [NW2] Ling Dodge, [facility] 19 1012      Row Name 19 0957             Vital Signs    Pretreatment Heart Rate (beats/min)  81  -NW      Intratreatment Heart Rate (beats/min)  93  -NW      Posttreatment Heart Rate (beats/min)  87  -NW      Pre SpO2 (%)  96  -NW      O2 Delivery Pre Treatment  room air  -NW      Intra SpO2 (%)  94  -NW      O2 Delivery Intra Treatment  room air  -NW      Post SpO2 (%)  93  -NW      O2 Delivery Post Treatment  room air  -NW      Recorded by [NW] Ling Dodge, PTA 19 1012      Row Name 19 0957             Safety Issues, Functional Mobility    Impairments Affecting Function (Mobility)  endurance/activity tolerance  -NW      Recorded by [NW] Ling Dodeg [facility] 19 1012      Row Name 19 0957             Bed Mobility  Assessment/Treatment    Comment (Bed Mobility)  chair  -NW      Recorded by [NW] Ling Dodge, [facility] 08/17/19 1012      Row Name 08/17/19 0957             Sit-Stand Transfer    Sit-Stand [place] (Transfers)  verbal cues;stand by assist  -NW      Recorded by [NW] Ling Dodge, [facility] 08/17/19 1012      Row Name 08/17/19 0957             Stand-Sit Transfer    Stand-Sit [place] (Transfers)  verbal cues;supervision  -NW      Recorded by [NW] Ling Dodge, [facility] 08/17/19 1012      Row Name 08/17/19 0957             Gait/Stairs Assessment/Training    [place] Level (Gait)  verbal cues;stand by assist  -NW      Distance in Feet (Gait)  250  -NW      Pattern (Gait)  step-through  -NW      Recorded by [NW] Ling Dodge, [facility] 08/17/19 1012      Row Name 08/17/19 0957             General ROM    GENERAL ROM COMMENTS  cardiac protocol   -NW      Recorded by [NW] Ling Dodge, [facility] 08/17/19 1012      Row Name 08/17/19 0957             Positioning and Restraints    Pre-Treatment Position  sitting in chair/recliner  -NW      Post Treatment Position  chair  -NW      In Chair  reclined;call light within reach;encouraged to call for assist;with family/caregiver  -NW      Recorded by [NW] Ling Dodge, [facility] 08/17/19 1012      Row Name 08/17/19 0957             Pain Scale: Numbers Pre/Post-Treatment    Pain Scale: Numbers, Post-Treatment  3/10  -NW      Pain Location - Orientation  incisional  -NW      Pain Location  chest  -NW      Pain Intervention(s)  Repositioned  -NW      Recorded by [NW] Ling Dodge, [facility] 08/17/19 1012      Row Name                Wound 08/12/19 1312 chest Incision    Wound - Properties Group Date first assessed: 08/12/19 [TM] Time first assessed: 1312 [TM] Location: chest [TM] Primary Wound Type: Incision [TM] Recorded by:  [TM] Winnie Nagy RN 08/12/19 1312    Row Name                Wound 08/12/19 1312 Right leg Incision    Wound - Properties Group Date first assessed:  08/12/19 [TM] Time first assessed: 1312 [TM] Side: Right [TM] Location: leg [TM] Primary Wound Type: Incision [TM] Recorded by:  [TM] Winnie Nagy RN 08/12/19 1312      User Key  (r) = Recorded By, (t) = Taken By, (c) = Cosigned By    Initials Name Effective Dates Discipline    NW Ling Dodge, FRANKI 08/02/16 -  PT    TM Winnie Nagy RN 07/26/16 -  Nurse          Wound 08/12/19 1312 chest Incision (Active)   Dressing Appearance open to air 8/17/2019  7:45 AM   Closure Liquid skin adhesive 8/17/2019  7:45 AM   Base clean;dry;pink 8/17/2019  7:45 AM   Periwound dry;intact 8/17/2019  7:45 AM   Periwound Temperature warm 8/17/2019  7:45 AM   Periwound Skin Turgor soft 8/17/2019  7:45 AM   Edges rolled/closed 8/16/2019  7:00 PM   Drainage Amount none 8/17/2019  7:45 AM       Wound 08/12/19 1312 Right leg Incision (Active)   Dressing Appearance open to air 8/17/2019  7:45 AM   Closure Liquid skin adhesive 8/17/2019  7:45 AM   Base clean;dry 8/17/2019  7:45 AM   Periwound intact;dry 8/17/2019  7:45 AM   Periwound Temperature warm 8/17/2019  7:45 AM   Periwound Skin Turgor soft 8/17/2019  7:45 AM   Edges rolled/closed 8/16/2019  7:00 PM   Drainage Amount none 8/17/2019  7:45 AM           Physical Therapy Education     Title: PT OT SLP Therapies (Done)     Topic: Physical Therapy (Done)     Point: Mobility training (Done)     Learning Progress Summary           Patient Acceptance, E,TB, VU by AH at 8/14/2019  8:58 AM    Comment:  bed mobility    Acceptance, E, VU by SB at 8/13/2019 11:54 AM    Comment:  pt edu on POC, benefits of act, d/c plans, warm up/cool down exercises and sternal precautions                   Point: Home exercise program (Done)     Learning Progress Summary           Patient Acceptance, E, VU by SB at 8/13/2019 11:54 AM    Comment:  pt edu on POC, benefits of act, d/c plans, warm up/cool down exercises and sternal precautions                   Point: Body mechanics (Done)     Learning  Progress Summary           Patient Acceptance, E, VU by  at 8/13/2019 11:54 AM    Comment:  pt edu on POC, benefits of act, d/c plans, warm up/cool down exercises and sternal precautions                   Point: Precautions (Done)     Learning Progress Summary           Patient Acceptance, E,TB, VU by  at 8/15/2019  8:52 AM    Comment:  trans    Acceptance, E, VU by  at 8/13/2019 11:54 AM    Comment:  pt edu on POC, benefits of act, d/c plans, warm up/cool down exercises and sternal precautions                               User Key     Initials Effective Dates Name Provider Type [facility] 08/02/16 -  Clarisa Chavarria PTA Physical Therapy Assistant PT    SB 08/31/18 -  Jocelynn Robledo PT Physical Therapist PT                PT Recommendation and Plan        Outcome Measures     Row Name 08/17/19 1000 08/15/19 0800          How much help from another person do you currently need...    Turning from your back to your side while in flat bed without using bedrails?  3  -NW  3  -AH     Moving from lying on back to sitting on the side of a flat bed without bedrails?  3  -NW  3  -AH     Moving to and from a bed to a chair (including a wheelchair)?  3  -NW  3  -AH     Standing up from a chair using your arms (e.g., wheelchair, bedside chair)?  3  -NW  3  -AH     Climbing 3-5 steps with a railing?  3  -NW  2  -AH     To walk in hospital room?  3  -NW  3  -AH     AM-PAC 6 Clicks Score (PT)  18  -NW  17  -AH        Functional Assessment    Outcome Measure Options  AM-PAC 6 Clicks Basic Mobility (PT)  -  AM-PAC 6 Clicks Basic Mobility (PT)  -       User Key  (r) = Recorded By, (t) = Taken By, (c) = Cosigned By    Initials Name Provider Type     Clarisa Chavarria, FRANKI Physical Therapy Assistant     Ling Dodge PTA Physical Therapy Assistant         Time Calculation:         PT G-Codes  Outcome Measure Options: AM-PAC 6 Clicks Basic Mobility (PT)  AM-PAC 6 Clicks Score (PT): 18    Ling Dodge  PTA  8/17/2019

## 2019-08-17 NOTE — PROGRESS NOTES
"Patient name: Piter Ro  Patient : 1945  VISIT # [id number]  MR #[id number]    Procedure:Procedure(s):  CABG X 6 WITH LIMA AND EVH WITH OPEN EXTENSION OF RLE  Procedure Date:2019  POD: 5 Days Post-Op    Subjective     Interval History:    Patient remains in sinus rhythm  Diuresing well  Ambulating, tolerating room air  Tolerating diet       Scheduled Meds:  amiodarone 400 mg Oral Q12H   aspirin 81 mg Oral Daily   atorvastatin 20 mg Oral Nightly   bisacodyl 10 mg Oral BID   clopidogrel 75 mg Oral Daily   enoxaparin 30 mg Subcutaneous Q12H   ipratropium-albuterol 3 mL Nebulization TID - RT   lidocaine 2 patch Transdermal Q24H   metoprolol tartrate 12.5 mg Oral Q12H   polyethylene glycol 17 g Oral Daily     Continuous Infusions:  hold 1 each     PRN Meds:.hold  •  ondansetron  •  oxyCODONE-acetaminophen    Drips:    None    Objective     Visit Vitals  /97 (BP Location: Left arm, Patient Position: Lying)   Pulse 83   Temp 98.8 °F (37.1 °C) (Oral)   Resp 18   Ht 180.3 cm (70.98\")   Wt 93.5 kg (206 lb 1.6 oz)   SpO2 98%   BMI 28.76 kg/m²       Intake/Output Summary (Last 24 hours) at 2019 1231  Last data filed at 2019 1000  Gross per 24 hour   Intake 600 ml   Output 540 ml   Net 60 ml       CT output:    Left chest drain: 230 mL's    Physical Exam:    General: Patient alert, awake. No acute distress  Chest: CTA, no crackles  CV: RRR, no murmurs  Abd:  Soft, non-tender, non-distended, no masses.  Extr: Warm, moves all extremities. Edema: 1+. Saphenectomy site clean, dry, and intact.   Neurologic: Grossly intact with no focal deficits.    Sternotomy incision clean, dry, and intact. Sternum stable. No clicks.    Lab:     CBC:  Results from last 7 days   Lab Units 19  0518 19  0415 08/15/19  0552   WBC 10*3/mm3 6.67 6.79 6.79   HEMATOCRIT % 23.4* 21.3* 20.9*   PLATELETS 10*3/mm3 246 163 131*          BMP:  Results from last 7 days   Lab Units 19  0518 19  0415 " 08/15/19  0552   SODIUM mmol/L 140 137 136   POTASSIUM mmol/L 4.1 3.8 4.0   CHLORIDE mmol/L 105 103 106   CO2 mmol/L 27.0 30.0 28.0   GLUCOSE mg/dL 105* 112* 125*   BUN mg/dL 13 13 14   CREATININE mg/dL 0.87 0.90 0.80          COAG:  Results from last 7 days   Lab Units 08/13/19  0255 08/12/19  1545   INR  1.21* 1.27*   APTT seconds  --  39.5*       IMAGES:       Imaging Results (last 24 hours)     ** No results found for the last 24 hours. **           Impression:    74-year-old gentleman status post coronary artery bypass grafting x6 vessels on August 12, 2019.  Patient remains hemodynamically stable with adequate oxygenation.  Patient remains in sinus rhythm. Patient still above preoperative weight, plan to continue diuresis      Plan:    Lasix 20 m IV daily  KCL replacement      Urban Montana MD  08/17/19  12:31 PM

## 2019-08-17 NOTE — PLAN OF CARE
Problem: Patient Care Overview  Goal: Plan of Care Review  Outcome: Ongoing (interventions implemented as appropriate)   08/17/19 0514   Coping/Psychosocial   Plan of Care Reviewed With patient   Plan of Care Review   Progress improving   OTHER   Outcome Summary Pt improving. VSS, no complaint of pain. Ambulated x4 8/16 and tolerated well. Diuresing well. Wires are taped and isolated. No further problems with KEV drain coming loose. Pt had good BM 8/16 after enema.      Goal: Interprofessional Rounds/Family Conf  Outcome: Ongoing (interventions implemented as appropriate)      Problem: Cardiac: ACS (Acute Coronary Syndrome) (Adult)  Goal: Signs and Symptoms of Listed Potential Problems Will be Absent, Minimized or Managed (Cardiac: ACS)  Outcome: Ongoing (interventions implemented as appropriate)      Problem: Fall Risk (Adult)  Goal: Absence of Fall  Outcome: Ongoing (interventions implemented as appropriate)      Problem: Cardiac Surgery (Adult)  Goal: Signs and Symptoms of Listed Potential Problems Will be Absent, Minimized or Managed (Cardiac Surgery)  Outcome: Ongoing (interventions implemented as appropriate)

## 2019-08-18 ENCOUNTER — APPOINTMENT (OUTPATIENT)
Dept: GENERAL RADIOLOGY | Facility: HOSPITAL | Age: 74
End: 2019-08-18

## 2019-08-18 PROCEDURE — 25010000002 ENOXAPARIN PER 10 MG: Performed by: THORACIC SURGERY (CARDIOTHORACIC VASCULAR SURGERY)

## 2019-08-18 PROCEDURE — 25010000002 FUROSEMIDE PER 20 MG: Performed by: THORACIC SURGERY (CARDIOTHORACIC VASCULAR SURGERY)

## 2019-08-18 PROCEDURE — 94799 UNLISTED PULMONARY SVC/PX: CPT

## 2019-08-18 PROCEDURE — 97116 GAIT TRAINING THERAPY: CPT

## 2019-08-18 PROCEDURE — 94760 N-INVAS EAR/PLS OXIMETRY 1: CPT

## 2019-08-18 PROCEDURE — 71045 X-RAY EXAM CHEST 1 VIEW: CPT

## 2019-08-18 PROCEDURE — 99024 POSTOP FOLLOW-UP VISIT: CPT | Performed by: THORACIC SURGERY (CARDIOTHORACIC VASCULAR SURGERY)

## 2019-08-18 RX ADMIN — METOPROLOL TARTRATE 12.5 MG: 25 TABLET, FILM COATED ORAL at 09:20

## 2019-08-18 RX ADMIN — ASPIRIN 81 MG: 81 TABLET ORAL at 09:20

## 2019-08-18 RX ADMIN — IPRATROPIUM BROMIDE AND ALBUTEROL SULFATE 3 ML: 2.5; .5 SOLUTION RESPIRATORY (INHALATION) at 14:31

## 2019-08-18 RX ADMIN — ENOXAPARIN SODIUM 30 MG: 30 INJECTION SUBCUTANEOUS at 21:07

## 2019-08-18 RX ADMIN — POTASSIUM CHLORIDE 40 MEQ: 750 CAPSULE, EXTENDED RELEASE ORAL at 09:20

## 2019-08-18 RX ADMIN — ATORVASTATIN CALCIUM 20 MG: 10 TABLET, FILM COATED ORAL at 21:06

## 2019-08-18 RX ADMIN — IPRATROPIUM BROMIDE AND ALBUTEROL SULFATE 3 ML: 2.5; .5 SOLUTION RESPIRATORY (INHALATION) at 19:09

## 2019-08-18 RX ADMIN — METOPROLOL TARTRATE 12.5 MG: 25 TABLET, FILM COATED ORAL at 21:06

## 2019-08-18 RX ADMIN — CLOPIDOGREL BISULFATE 75 MG: 75 TABLET, FILM COATED ORAL at 17:26

## 2019-08-18 RX ADMIN — OXYCODONE HYDROCHLORIDE AND ACETAMINOPHEN 1 TABLET: 5; 325 TABLET ORAL at 17:26

## 2019-08-18 RX ADMIN — FUROSEMIDE 20 MG: 10 INJECTION, SOLUTION INTRAMUSCULAR; INTRAVENOUS at 09:19

## 2019-08-18 RX ADMIN — ENOXAPARIN SODIUM 30 MG: 30 INJECTION SUBCUTANEOUS at 09:19

## 2019-08-18 RX ADMIN — IPRATROPIUM BROMIDE AND ALBUTEROL SULFATE 3 ML: 2.5; .5 SOLUTION RESPIRATORY (INHALATION) at 06:43

## 2019-08-18 RX ADMIN — LIDOCAINE 2 PATCH: 50 PATCH TOPICAL at 09:19

## 2019-08-18 RX ADMIN — AMIODARONE HYDROCHLORIDE 400 MG: 200 TABLET ORAL at 21:06

## 2019-08-18 RX ADMIN — AMIODARONE HYDROCHLORIDE 400 MG: 200 TABLET ORAL at 09:19

## 2019-08-18 NOTE — THERAPY TREATMENT NOTE
Acute Care - Physical Therapy Treatment Note  [facility]     Patient Name: Piter Ro  : 1945  MRN: [id number]  Today's Date: 2019  Onset of Illness/Injury or Date of Surgery: 19  Date of Referral to PT: 19  Referring Physician: Dr. Blackburn    Admit Date: 2019    Visit Dx:    ICD-10-CM ICD-9-CM   1. Chest pain, unspecified type R07.9 786.50   2. Left main coronary artery disease I25.10 414.00   3. Impaired mobility Z74.09 799.89     Patient Active Problem List   Diagnosis   • Chest pain   • Left main coronary artery disease   • Unstable angina (CMS/HCC)   • Hypertension   • Hypercholesteremia       Therapy Treatment    Rehabilitation Treatment Summary     Row Name 19 1009             Treatment Time/Intention    Discipline  physical therapy assistant  -NW      Document Type  therapy note (daily note)  -NW      Subjective Information  complains of;pain  -NW2      Patient/Family Observations  family present  -NW2      Patient Effort  good  -NW2      Comment  drain  -NW2      Existing Precautions/Restrictions  cardiac;fall;sternal  -NW2      Recorded by [NW] Ling Dodge, PTA 19 1019  [NW2] Ling Dodge, [facility] 19 1028      Row Name 19 1009             Vital Signs    Pretreatment Heart Rate (beats/min)  81  -NW      Intratreatment Heart Rate (beats/min)  97  -NW      Posttreatment Heart Rate (beats/min)  95  -NW      Pre SpO2 (%)  98  -NW      O2 Delivery Pre Treatment  room air  -NW      Intra SpO2 (%)  96  -NW      O2 Delivery Intra Treatment  room air  -NW      Post SpO2 (%)  97  -NW      O2 Delivery Post Treatment  room air  -NW      Recorded by [NW] Ling Dodge, PTA 19 1028      Row Name 19 1009             Safety Issues, Functional Mobility    Impairments Affecting Function (Mobility)  endurance/activity tolerance  -NW      Recorded by [NW] Ling Dodge [facility] 19 1028      Row Name 19 1009             Bed Mobility  Assessment/Treatment    Sidelying-Sit [place] (Bed Mobility)  -- chair  -NW      Comment (Bed Mobility)  chair  -NW      Recorded by [NW] Ling Dodge, PTA 08/18/19 1028      Row Name 08/18/19 1009             Sit-Stand Transfer    Sit-Stand [place] (Transfers)  verbal cues;stand by assist  -NW      Recorded by [NW] Ling Dodge, PTA 08/18/19 1028      Row Name 08/18/19 1009             Stand-Sit Transfer    Stand-Sit [place] (Transfers)  verbal cues;supervision  -NW      Recorded by [NW] Ling Dodge, PTA 08/18/19 1028      Row Name 08/18/19 1009             Gait/Stairs Assessment/Training    [place] Level (Gait)  verbal cues;stand by assist  -NW      Distance in Feet (Gait)  280  -NW      Pattern (Gait)  step-through  -NW      Deviations/Abnormal Patterns (Gait)  joselito decreased;stride length decreased  -NW      Recorded by [NW] Ling Dodge, PTA 08/18/19 1028      Row Name 08/18/19 1009             Therapeutic Exercise    Comment (Therapeutic Exercise)  cardiac protocol  -NW      Recorded by [NW] Ling Dodge, PTA 08/18/19 1028      Row Name 08/18/19 1009             Positioning and Restraints    Pre-Treatment Position  sitting in chair/recliner  -NW      Post Treatment Position  chair  -NW      In Chair  reclined;call light within reach;encouraged to call for assist;with family/caregiver  -NW      Recorded by [NW] Ling Dodge, PTA 08/18/19 1028      Row Name 08/18/19 1009             Pain Scale: Numbers Pre/Post-Treatment    Pain Scale: Numbers, Post-Treatment  2/10  -NW      Pain Location - Orientation  incisional  -NW      Pain Location  chest  -NW      Recorded by [NW] Ling Dodge, PTA 08/18/19 1028      Row Name                Wound 08/12/19 1312 chest Incision    Wound - Properties Group Date first assessed: 08/12/19 [TM] Time first assessed: 1312 [TM] Location: chest [TM] Primary Wound Type: Incision [TM] Recorded by:  [TM] Winnie Nagy RN  08/12/19 1312    Row Name                Wound 08/12/19 1312 Right leg Incision    Wound - Properties Group Date first assessed: 08/12/19 [TM] Time first assessed: 1312 [TM] Side: Right [TM] Location: leg [TM] Primary Wound Type: Incision [TM] Recorded by:  [TM] Winnie Nagy RN 08/12/19 1312      User Key  (r) = Recorded By, (t) = Taken By, (c) = Cosigned By    Initials Name Effective Dates Discipline    NW Ling Dodge, PTA 08/02/16 -  PT    TM Winnie Nagy RN 07/26/16 -  Nurse          Wound 08/12/19 1312 chest Incision (Active)   Dressing Appearance open to air 8/18/2019  7:57 AM   Closure Liquid skin adhesive 8/18/2019  7:57 AM   Base clean;dry;pink 8/18/2019  7:57 AM   Periwound dry;intact 8/18/2019  7:57 AM   Periwound Temperature warm 8/18/2019  7:57 AM   Periwound Skin Turgor soft 8/18/2019  7:57 AM   Edges rolled/closed 8/17/2019  7:25 PM   Drainage Amount none 8/18/2019  7:57 AM       Wound 08/12/19 1312 Right leg Incision (Active)   Dressing Appearance open to air 8/18/2019  7:57 AM   Closure Liquid skin adhesive 8/18/2019  7:57 AM   Base clean;dry 8/18/2019  7:57 AM   Periwound intact;dry 8/18/2019  7:57 AM   Periwound Temperature warm 8/18/2019  7:57 AM   Periwound Skin Turgor soft 8/18/2019  7:57 AM   Edges rolled/closed 8/17/2019  7:25 PM   Drainage Amount none 8/18/2019  7:57 AM           Physical Therapy Education     Title: PT OT SLP Therapies (Done)     Topic: Physical Therapy (Done)     Point: Mobility training (Done)     Learning Progress Summary           Patient Acceptance, E,TB, VU by AH at 8/14/2019  8:58 AM    Comment:  bed mobility    Acceptance, E, VU by SB at 8/13/2019 11:54 AM    Comment:  pt edu on POC, benefits of act, d/c plans, warm up/cool down exercises and sternal precautions                   Point: Home exercise program (Done)     Learning Progress Summary           Patient Acceptance, NANCY VU by SB at 8/13/2019 11:54 AM    Comment:  pt edu on POC, benefits of  act, d/c plans, warm up/cool down exercises and sternal precautions                   Point: Body mechanics (Done)     Learning Progress Summary           Patient Acceptance, E, VU by SB at 8/13/2019 11:54 AM    Comment:  pt edu on POC, benefits of act, d/c plans, warm up/cool down exercises and sternal precautions                   Point: Precautions (Done)     Learning Progress Summary           Patient Acceptance, E,TB, VU by  at 8/15/2019  8:52 AM    Comment:  trans    Acceptance, E, VU by SB at 8/13/2019 11:54 AM    Comment:  pt edu on POC, benefits of act, d/c plans, warm up/cool down exercises and sternal precautions                               User Key     Initials Effective Dates Name Provider Type [facility] 08/02/16 -  Clarisa Chavarria PTA Physical Therapy Assistant PT    SB 08/31/18 -  Jocelynn Robledo PT Physical Therapist PT                PT Recommendation and Plan        Outcome Measures     Row Name 08/18/19 1000 08/17/19 1000          How much help from another person do you currently need...    Turning from your back to your side while in flat bed without using bedrails?  3  -NW  3  -NW     Moving from lying on back to sitting on the side of a flat bed without bedrails?  3  -NW  3  -NW     Moving to and from a bed to a chair (including a wheelchair)?  3  -NW  3  -NW     Standing up from a chair using your arms (e.g., wheelchair, bedside chair)?  3  -NW  3  -NW     Climbing 3-5 steps with a railing?  3  -NW  3  -NW     To walk in hospital room?  3  -NW  3  -NW     AM-PAC 6 Clicks Score (PT)  18  -NW  18  -NW        Functional Assessment    Outcome Measure Options  AM-PAC 6 Clicks Basic Mobility (PT)  -NW  AM-PAC 6 Clicks Basic Mobility (PT)  -NW       User Key  (r) = Recorded By, (t) = Taken By, (c) = Cosigned By    Initials Name Provider Type    Ling Araiza PTA Physical Therapy Assistant         Time Calculation:   PT Charges     Row Name 08/18/19 1028             Time  Calculation    Start Time  1009  -NW      Stop Time  1028  -NW      Time Calculation (min)  19 min  -NW      PT Received On  08/18/19  -NW      PT Goal Re-Cert Due Date  08/23/19  -NW         Time Calculation- PT    Total Timed Code Minutes- PT  19 minute(s)  -NW         Timed Charges    36686 - Gait Training Minutes   19  -NW        User Key  (r) = Recorded By, (t) = Taken By, (c) = Cosigned By    Initials Name Provider Type    NW Ling Dodge PTA Physical Therapy Assistant        Therapy Charges for Today     Code Description Service Date Service Provider Modifiers Qty    02031696856 HC GAIT TRAINING EA 15 MIN 8/17/2019 Ling Dodge, PTA GP 1    06552955903 HC GAIT TRAINING EA 15 MIN 8/17/2019 Ling Dodge, FRANKI GP 1    83311613761 HC GAIT TRAINING EA 15 MIN 8/18/2019 Ling Dodge, FRANKI GP 1          PT G-Codes  Outcome Measure Options: AM-PAC 6 Clicks Basic Mobility (PT)  AM-PAC 6 Clicks Score (PT): 18    Ling Dodge PTA  8/18/2019

## 2019-08-18 NOTE — PLAN OF CARE
Problem: Patient Care Overview  Goal: Plan of Care Review  Outcome: Ongoing (interventions implemented as appropriate)   08/18/19 0608   Coping/Psychosocial   Plan of Care Reviewed With patient   OTHER   Outcome Summary Pt continues to improve. KEV drain still draining. Wires taped and isolated. VSS, no complaints of pain. Safety maintained with no falls.      Goal: Interprofessional Rounds/Family Conf  Outcome: Ongoing (interventions implemented as appropriate)      Problem: Cardiac: ACS (Acute Coronary Syndrome) (Adult)  Goal: Signs and Symptoms of Listed Potential Problems Will be Absent, Minimized or Managed (Cardiac: ACS)  Outcome: Ongoing (interventions implemented as appropriate)      Problem: Fall Risk (Adult)  Goal: Absence of Fall  Outcome: Ongoing (interventions implemented as appropriate)      Problem: Cardiac Surgery (Adult)  Goal: Signs and Symptoms of Listed Potential Problems Will be Absent, Minimized or Managed (Cardiac Surgery)  Outcome: Ongoing (interventions implemented as appropriate)

## 2019-08-18 NOTE — PLAN OF CARE
Problem: Patient Care Overview  Goal: Plan of Care Review  Outcome: Ongoing (interventions implemented as appropriate)   08/18/19 4725   Coping/Psychosocial   Plan of Care Reviewed With patient   Plan of Care Review   Progress improving   OTHER   Outcome Summary VSS. No complaints of pain. Patient ambulated the halls X4. Patient is underexerting himself. When walking in the halls he asks to go back to room nearly immediately. IV lasix given. 5 pounds from baseline weight.  KEV drain still with a good amount of output. IS~1500. Incisions CDI, PPP.  Safety maintained, no falls. Will cont to monitor.

## 2019-08-18 NOTE — PROGRESS NOTES
"Patient name: Piter Ro  Patient : 1945  VISIT # [id number]  MR #[id number]    Procedure:Procedure(s):  CABG X 6 WITH LIMA AND EVH WITH OPEN EXTENSION OF RLE  Procedure Date:2019  POD: 6 days Post-Op    Subjective     Interval History:    Patient remains in sinus rhythm  Diuresing well  Ambulating, tolerating room air  Tolerating diet  Still with increased output in the left chest tube       Scheduled Meds:    amiodarone 400 mg Oral Q12H   aspirin 81 mg Oral Daily   atorvastatin 20 mg Oral Nightly   bisacodyl 10 mg Oral BID   clopidogrel 75 mg Oral Daily   enoxaparin 30 mg Subcutaneous Q12H   furosemide 20 mg Intravenous Daily   ipratropium-albuterol 3 mL Nebulization TID - RT   lidocaine 2 patch Transdermal Q24H   metoprolol tartrate 12.5 mg Oral Q12H   polyethylene glycol 17 g Oral Daily   potassium chloride 40 mEq Oral Daily     Continuous Infusions:    hold 1 each     PRN Meds:.hold  •  ondansetron  •  oxyCODONE-acetaminophen    Drips:    None    Objective     Visit Vitals  /66 (BP Location: Right arm, Patient Position: Lying)   Pulse 79   Temp 98.6 °F (37 °C) (Oral)   Resp 18   Ht 180.3 cm (70.98\")   Wt 90.8 kg (200 lb 3.2 oz)   SpO2 94%   BMI 27.93 kg/m²       Intake/Output Summary (Last 24 hours) at 2019 1300  Last data filed at 2019 1200  Gross per 24 hour   Intake 480 ml   Output 3000 ml   Net -2520 ml       CT output:    Left chest drain: 340 mL's    Physical Exam:    General: Patient alert, awake. No acute distress  Chest: CTA, no crackles  CV: RRR, no murmurs  Abd:  Soft, non-tender, non-distended, no masses.  Extr: Warm, moves all extremities. Edema: 1+. Saphenectomy site clean, dry, and intact.   Neurologic: Grossly intact with no focal deficits.    Sternotomy incision clean, dry, and intact. Sternum stable. No clicks.    Lab:     CBC:  Results from last 7 days   Lab Units 19  0518 19  0415 08/15/19  0552   WBC 10*3/mm3 6.67 6.79 6.79   HEMATOCRIT % " 23.4* 21.3* 20.9*   PLATELETS 10*3/mm3 246 163 131*          BMP:  Results from last 7 days   Lab Units 08/17/19  0518 08/16/19  0415 08/15/19  0552   SODIUM mmol/L 140 137 136   POTASSIUM mmol/L 4.1 3.8 4.0   CHLORIDE mmol/L 105 103 106   CO2 mmol/L 27.0 30.0 28.0   GLUCOSE mg/dL 105* 112* 125*   BUN mg/dL 13 13 14   CREATININE mg/dL 0.87 0.90 0.80          COAG:  Results from last 7 days   Lab Units 08/13/19  0255 08/12/19  1545   INR  1.21* 1.27*   APTT seconds  --  39.5*       IMAGES:       Imaging Results (last 24 hours)     Procedure Component Value Units Date/Time    XR Chest 1 View [[id number]] Collected:  08/18/19 0756     Updated:  08/18/19 0802    Narrative:       Exam: XR CHEST 1 VW-     Indication: Post CABG; R07.9-Chest pain, unspecified;  I25.10-Atherosclerotic heart disease of native coronary artery without  angina pectoris; Z74.09-Other reduced mobility     Comparison: 08/15/2019     Findings:     Right IJ sheath has been removed.   Mediastinal drains are no longer visualized.   Substernal left basilar thoracostomy tube remains in place.      No visible pneumothorax. Cardiac silhouette remains stable. No change  pleural/parenchymal opacity at the left lung base and subtle  interstitial opacity in the medial right lower lung. No new airspace  opacity. Median sternotomy wires remain aligned.       Impression:          1.  Right IJ sheath and mediastinal drains have been removed.  2.  Left chest tube is stable in position.  3.  No change in bibasilar pleural/parenchymal opacities, greatest on  the left.  This report was finalized on 08/18/2019 07:59 by Dr. Rashaad Meng MD.           Impression:    74-year-old gentleman status post coronary artery bypass grafting x6 vessels on August 12, 2019.  Patient remains hemodynamically stable with adequate oxygenation.  Patient remains in sinus rhythm. Patient 5 pounds above preoperative weight, plan to continue diuresis.  Still with elevated output in left  chest tube, plan to keep chest tube in place.      Plan:    Lasix 20 m IV daily  KCL replacement  Labs in a.m.      Urban Montana MD  08/18/19  1:00 PM

## 2019-08-19 ENCOUNTER — APPOINTMENT (OUTPATIENT)
Dept: GENERAL RADIOLOGY | Facility: HOSPITAL | Age: 74
End: 2019-08-19

## 2019-08-19 VITALS
WEIGHT: 196.2 LBS | OXYGEN SATURATION: 95 % | HEART RATE: 65 BPM | HEIGHT: 71 IN | DIASTOLIC BLOOD PRESSURE: 75 MMHG | BODY MASS INDEX: 27.47 KG/M2 | TEMPERATURE: 98.2 F | RESPIRATION RATE: 16 BRPM | SYSTOLIC BLOOD PRESSURE: 128 MMHG

## 2019-08-19 PROBLEM — Z87.891 FORMER SMOKER: Status: ACTIVE | Noted: 2019-08-19

## 2019-08-19 LAB
ANION GAP SERPL CALCULATED.3IONS-SCNC: 7 MMOL/L (ref 4–13)
BASOPHILS # BLD AUTO: 0.02 10*3/MM3 (ref 0–0.2)
BASOPHILS NFR BLD AUTO: 0.3 % (ref 0–1.5)
BUN BLD-MCNC: 12 MG/DL (ref 5–21)
BUN/CREAT SERPL: 13 (ref 7–25)
CALCIUM SPEC-SCNC: 8.4 MG/DL (ref 8.4–10.4)
CHLORIDE SERPL-SCNC: 105 MMOL/L (ref 98–110)
CO2 SERPL-SCNC: 25 MMOL/L (ref 24–31)
CREAT BLD-MCNC: 0.92 MG/DL (ref 0.5–1.4)
DEPRECATED RDW RBC AUTO: 44.9 FL (ref 37–54)
EOSINOPHIL # BLD AUTO: 0.23 10*3/MM3 (ref 0–0.4)
EOSINOPHIL NFR BLD AUTO: 2.9 % (ref 0.3–6.2)
ERYTHROCYTE [DISTWIDTH] IN BLOOD BY AUTOMATED COUNT: 13.4 % (ref 12.3–15.4)
GFR SERPL CREATININE-BSD FRML MDRD: 81 ML/MIN/1.73
GLUCOSE BLD-MCNC: 98 MG/DL (ref 70–100)
GLUCOSE BLDC GLUCOMTR-MCNC: 111 MG/DL (ref 70–130)
GLUCOSE BLDC GLUCOMTR-MCNC: 115 MG/DL (ref 70–130)
HCT VFR BLD AUTO: 25.2 % (ref 37.5–51)
HGB BLD-MCNC: 8.7 G/DL (ref 13–17.7)
IMM GRANULOCYTES # BLD AUTO: 0.07 10*3/MM3 (ref 0–0.05)
IMM GRANULOCYTES NFR BLD AUTO: 0.9 % (ref 0–0.5)
LYMPHOCYTES # BLD AUTO: 1.57 10*3/MM3 (ref 0.7–3.1)
LYMPHOCYTES NFR BLD AUTO: 19.8 % (ref 19.6–45.3)
MAGNESIUM SERPL-MCNC: 2.1 MG/DL (ref 1.4–2.2)
MCH RBC QN AUTO: 32.2 PG (ref 26.6–33)
MCHC RBC AUTO-ENTMCNC: 34.5 G/DL (ref 31.5–35.7)
MCV RBC AUTO: 93.3 FL (ref 79–97)
MONOCYTES # BLD AUTO: 0.78 10*3/MM3 (ref 0.1–0.9)
MONOCYTES NFR BLD AUTO: 9.8 % (ref 5–12)
NEUTROPHILS # BLD AUTO: 5.25 10*3/MM3 (ref 1.7–7)
NEUTROPHILS NFR BLD AUTO: 66.3 % (ref 42.7–76)
NRBC BLD AUTO-RTO: 0 /100 WBC (ref 0–0.2)
PLATELET # BLD AUTO: 317 10*3/MM3 (ref 140–450)
PMV BLD AUTO: 10.2 FL (ref 6–12)
POTASSIUM BLD-SCNC: 4.3 MMOL/L (ref 3.5–5.3)
RBC # BLD AUTO: 2.7 10*6/MM3 (ref 4.14–5.8)
SODIUM BLD-SCNC: 137 MMOL/L (ref 135–145)
WBC NRBC COR # BLD: 7.92 10*3/MM3 (ref 3.4–10.8)

## 2019-08-19 PROCEDURE — 82962 GLUCOSE BLOOD TEST: CPT

## 2019-08-19 PROCEDURE — 97110 THERAPEUTIC EXERCISES: CPT

## 2019-08-19 PROCEDURE — 71046 X-RAY EXAM CHEST 2 VIEWS: CPT

## 2019-08-19 PROCEDURE — 97116 GAIT TRAINING THERAPY: CPT

## 2019-08-19 PROCEDURE — 25010000002 ENOXAPARIN PER 10 MG: Performed by: THORACIC SURGERY (CARDIOTHORACIC VASCULAR SURGERY)

## 2019-08-19 PROCEDURE — 94760 N-INVAS EAR/PLS OXIMETRY 1: CPT

## 2019-08-19 PROCEDURE — 25010000002 FUROSEMIDE PER 20 MG: Performed by: THORACIC SURGERY (CARDIOTHORACIC VASCULAR SURGERY)

## 2019-08-19 PROCEDURE — 94799 UNLISTED PULMONARY SVC/PX: CPT

## 2019-08-19 PROCEDURE — 85025 COMPLETE CBC W/AUTO DIFF WBC: CPT | Performed by: THORACIC SURGERY (CARDIOTHORACIC VASCULAR SURGERY)

## 2019-08-19 PROCEDURE — 83735 ASSAY OF MAGNESIUM: CPT | Performed by: THORACIC SURGERY (CARDIOTHORACIC VASCULAR SURGERY)

## 2019-08-19 PROCEDURE — 80048 BASIC METABOLIC PNL TOTAL CA: CPT | Performed by: THORACIC SURGERY (CARDIOTHORACIC VASCULAR SURGERY)

## 2019-08-19 PROCEDURE — 99024 POSTOP FOLLOW-UP VISIT: CPT | Performed by: NURSE PRACTITIONER

## 2019-08-19 RX ORDER — OXYCODONE HYDROCHLORIDE AND ACETAMINOPHEN 5; 325 MG/1; MG/1
1 TABLET ORAL EVERY 6 HOURS PRN
Qty: 30 TABLET | Refills: 0 | Status: SHIPPED | OUTPATIENT
Start: 2019-08-19

## 2019-08-19 RX ORDER — ASPIRIN 81 MG/1
81 TABLET ORAL DAILY
Qty: 30 TABLET | Refills: 2 | Status: SHIPPED | OUTPATIENT
Start: 2019-08-20

## 2019-08-19 RX ORDER — ATORVASTATIN CALCIUM 40 MG/1
40 TABLET, FILM COATED ORAL NIGHTLY
Qty: 30 TABLET | Refills: 2 | Status: SHIPPED | OUTPATIENT
Start: 2019-08-19

## 2019-08-19 RX ORDER — FUROSEMIDE 40 MG/1
40 TABLET ORAL DAILY
Qty: 7 TABLET | Refills: 0 | Status: SHIPPED | OUTPATIENT
Start: 2019-08-19 | End: 2019-08-28

## 2019-08-19 RX ORDER — POTASSIUM CHLORIDE 750 MG/1
20 CAPSULE, EXTENDED RELEASE ORAL DAILY
Qty: 14 CAPSULE | Refills: 0 | Status: SHIPPED | OUTPATIENT
Start: 2019-08-20 | End: 2019-08-28

## 2019-08-19 RX ORDER — BENAZEPRIL HYDROCHLORIDE 10 MG/1
5 TABLET ORAL DAILY
Qty: 15 TABLET | Refills: 2 | Status: SHIPPED | OUTPATIENT
Start: 2019-08-19

## 2019-08-19 RX ORDER — CLOPIDOGREL BISULFATE 75 MG/1
75 TABLET ORAL DAILY
Qty: 30 TABLET | Refills: 2 | Status: SHIPPED | OUTPATIENT
Start: 2019-08-19

## 2019-08-19 RX ADMIN — METOPROLOL TARTRATE 12.5 MG: 25 TABLET, FILM COATED ORAL at 08:08

## 2019-08-19 RX ADMIN — CLOPIDOGREL BISULFATE 75 MG: 75 TABLET, FILM COATED ORAL at 16:53

## 2019-08-19 RX ADMIN — POTASSIUM CHLORIDE 40 MEQ: 750 CAPSULE, EXTENDED RELEASE ORAL at 08:06

## 2019-08-19 RX ADMIN — IPRATROPIUM BROMIDE AND ALBUTEROL SULFATE 3 ML: 2.5; .5 SOLUTION RESPIRATORY (INHALATION) at 07:07

## 2019-08-19 RX ADMIN — ASPIRIN 81 MG: 81 TABLET ORAL at 08:06

## 2019-08-19 RX ADMIN — ENOXAPARIN SODIUM 30 MG: 30 INJECTION SUBCUTANEOUS at 08:19

## 2019-08-19 RX ADMIN — LIDOCAINE 2 PATCH: 50 PATCH TOPICAL at 08:06

## 2019-08-19 RX ADMIN — FUROSEMIDE 20 MG: 10 INJECTION, SOLUTION INTRAMUSCULAR; INTRAVENOUS at 08:19

## 2019-08-19 RX ADMIN — AMIODARONE HYDROCHLORIDE 400 MG: 200 TABLET ORAL at 08:06

## 2019-08-19 RX ADMIN — BISACODYL 10 MG: 5 TABLET ORAL at 08:06

## 2019-08-19 NOTE — OP NOTE
Date of Procedure:  8/12/2019    Preoperative Diagnosis:   1.  Coronary artery disease [I25.10]  2.  Unstable angina  3.  Hypertension  4.  Hypercholesterolemia      Postoperative Diagnosis:  Left main coronary artery disease [I25.10]    Procedures Performed:  1. Coronary artery bypass grafting-6 vessel (left internal mammary artery/sequencing mid left anterior descending and distal left anterior descending, reverse saphenous vein graft/third diagonal artery, reverse saphenous vein graft sequencing the second obtuse marginal and third obtuse marginal, reverse saphenous vein graft/Posterior descending artery)  2. Right endoscopic vein harvest with open extension    Surgeon:  Parveen Blackburn MD  Assistants:  Winnie Barber   Anesthesia Staff:  CRNA: Marie Richmond CRNA  Anesthesia Type:  General    Estimated Blood Loss:  Minimal (Cell Saver)  Drains:  1. 19 Turkish Roosevelt drain-left pleural space  2. 24 Turkish Roosevelt drains-anterior and posterior mediastinum    Specimens:  None      Operative Findings:  Excellent arterial conduit. Good venous conduit. The mid LAD at site of grafting measured 1.5 mm in size and had mild atherosclerotic disease burden. Post bypass grafting had excellent arterial runoff.  The distal LAD at site of grafting measured 1.3 mm in size and had moderate atherosclerotic disease burden. Post bypass grafting had excellent arterial runoff.  The OM2 artery measured 1.5 mm in size and had mild-moderate atherosclerotic disease burden.  Post bypass grafting had excellent arterial runoff. The OM3 artery measured 1.4 mm in size and had moderate atherosclerotic disease burden.  The diagonal at site of grafting measured 1.3 mm in size and had mild atherosclerotic disease burden. Post bypass grafting had excellent arterial runoff.   The posterior descending artery measured 1.7 mm in size and had excellent arterial runoff with moderate atherosclerotic disease burden.  Transesophageal echocardiography  salient findings include preserved left ventricular function with no significant valvular disease.        Total aortic cross-clamp time: 190 minutes  Total cardiac bypass time: 157 minutes     Operative description in detail:  The patient was taken to the operative suite where he  was placed in a supine position.  Induction of general anesthesia and placement of a single-lumen endotracheal tube was performed without remark.  Appropriate arterial and venous access was established without remark.  Through the previously placed right internal jugular central venous line, a pulmonary artery catheter was floated into position.  The patient  was then prepped and draped in the usual and sterile surgical fashion.  A timeout was performed.  Perioperative antibiotics were administered. Beta blocker was given.    A two team approach was utilized to harvest both the left internal mammary artery and the right greater saphenous vein.   Briefly the right greater saphenous vein was taken at the level of the knee medially and taken in a prograde fashion for an anticipated length of vein to the fossa ovalis.  Blunt dissection was performed and each branch was taken using the Taifatechview device.  A counter stab incision was made with both proximal and distal control obtained.  The vein was extracted from a hemostatic tunnel.  Additional vein was taken distally endoscopically.  The leg was closed in a layered fashion with Vicryl suture.  The vein was prepared without remark.      While the vein was being harvested, median sternotomy was performed by me. Pericardial fat in midline from the level of the innominate vein to the level of the diaphragm was divided in midline.  A Rultract device was used to expose the posterior sternal table.  The left internal mammary artery was taken down using a standard pedicle technique with a combination of electrocautery and/or clips to control all side branches.  At that time, the patient was  systemically anticoagulated with IV heparin.  A 19 Kazakh Roosevelt drain was placed in the left pleural space.  After suitable time of circulating heparin, clips were placed doubly onto the mammary artery distally and it was divided proximal to the previously placed clips.  It had excellent arterial inflow.  The mammary artery was controlled distally.  The mammary artery harvest bed was hemostatic.  The Rultract device was removed from the sterile field and a Genesse retractor was used for exposure.  The mammary artery was prepared for bypass grafting and deemed excellent.  A pericardial well was created. I elected to cannulate the heart centrally accessing distally the ascending aorta and the right atrial appendage.  Each cannula was placed in continuity with the appropriate pump line. Retrograde autologous prime was completed as indicated.  A combined cardioplegia/aortic root vent set was secured with a horizontal mattress 4-0 Prolene suture.  With an appropriate ACT and all in readiness, cardiopulmonary bypass was commenced.  I dissected out the second and third obtuse marginal, PDA, diagonal, and the mid as well as distal LAD for suitable sites for bypass grafting.  With that, I proceeded to apply the aortic cross-clamp and administered cardioplegia utilizing a warm induction strategy.  Upon achieving electrical-mechanical arrest, cold blood potassium cardioplegia was administered to a total standard dose.  We did implement systemic hypothermia mild and apply topical hypothermia to the ventricle.  At appropriate intervals, doses of cardioplegia were administered throughout the conduct of bypass grafting.     I directed my attention towards the PDA.  A coronary arteriotomy was made and augmented to size with Solis scissors.  It is as per operative findings.  The anastomosis was constructed in an end-to-side orientation with running 7-0 Prolene suture.  With that its proximal anastomosis was  constructed following  aortotomy with 11 blade and augmented size with 4 mm arterial punch subsequently.  This was constructed in a side aorta end vein graft fashion with running 6-0 Prolene suture. An AC  was placed.  The graft was assessed for lay which was excellent. It is without tension or torsion.     To that end I directed my attention towards the second obtuse marginal.  A coronary arteriotomy was made and augmented to size with Solis scissors.  It is as per operative findings.  The anastomosis was constructed in an side-to-side orientation with running 7-0 Prolene suture.  It lay was performed with a sequencing of the OM2 with the OM2 using vein.  As such, a end-to-side anastomosis was made with running 7-0 prolene suture.  It is as per operative findings.  With that its proximal anastomosis was constructed following aortotomy with 11 blade and augmented size with 4 mm arterial punch after vein distals were performed.  This was constructed in a side aorta end vein graft fashion with running 6-0 Prolene suture. An AC  was placed.  The graft was assessed for lay which was excellent.  It is without tension or torsion.     I directed my attention towards the diagonal.  A coronary arteriotomy was made and augmented size with Solis scissors.  It is as per operative findings.  The anastomosis was constructed in an end-to-side orientation with running 7-0 Prolene suture.  With that its proximal anastomosis was constructed onto the aorta using side aorta- to  end vein graft anastomosis with 6-0 Prolene suture.  The graft was assessed for lay which was excellent.  It is without tension or torsion.          During a dose of cardioplegia, a pericardial slit left lateral was made while dividing associate pericardiophrenic fat and vasculature while being mindful of the lay of the phrenic nerve.  As such I proceeded to obtain control proximally onto the mammary artery.  The mid LAD was grafted in a side to side orientation with  running 8-0 prolene suture.  After determining the needed length the MALCOLM was spatulated distally.  I grafted this onto the LAD following coronary arteriotomy using end to side orientation using 8-0 prolene suture.  They are as per operative findings and the anastomosis was hemostatic.  I did tack the mammary artery pedicle to the anterior aspect heart with 6-0 Prolene sutures.    With that being accomplished, a terminal hotshot was administered.  The patient was placed in trendelenburg position.  Upon completion of terminal hotshot and placement of temporary epicardial pacing wires, with the aortic vent on high and pump flows diminished, the aortic cross-clamp was released.  With that, full support was implemented.  A perfusable rhythm spontaneously returned.  A nonworking beating phase was implemented.  Ventilation restored.  I surveyed each graft and each anastomosis was hemostatic and had excellent lay.  With all in readiness, the heart was allowed to fill.  De-airing maneuvers were performed as guided by transesophageal echocardiography.  With that the heart was decompressed and we removed the aortic root vent/cardioplegia cannula set.  Its associated pursestring suture was tied securely and this was reinforced as per matter of routine. The table was now placed in neutral position.  With all in readiness, we proceeded to wean from and separate from cardiopulmonary bypass.  I did decannulate the venous line and snared down its associated pursestring suture.  Systemic intravenous protamine was administered.  All associated blood volume was returned to the patient. With continued good hemodynamics, I decannulate the arterial line and tied down it associated pursestring sutures.  At this time I tied down the previously snared pursestring suture.  The mediastinum was drained with 24 Hungarian Roosevelt drains placed anteriorly and posteriorly.  I surveyed the chest and hemostasis was pristine.  With that I impregnated the  sternal edges with vancomycin, thrombin, and Gelfoam paste.  The sternum was reapproximated with stainless sterile wires placed in an interrupted fashion.  In layers anatomically the soft tissue planes were reapproximated. Instruments, sharps, and sponge counts were reported as correct.      Complications: None     Disposition: Transferred to ICU in stable and guarded condition.

## 2019-08-19 NOTE — DISCHARGE SUMMARY
[facility] Cardiothoracic Surgery  DISCHARGE SUMMARY        Date of Admission: 8/8/2019  Date of Discharge:  8/19/2019  Primary Care Physician: Marialuisa Colunga MD    Discharge Diagnoses:  Active Hospital Problems    Diagnosis   • **Unstable angina (CMS/HCC)   • Former smoker   • Hypertension   • Hypercholesteremia   • Left main coronary artery disease   • Chest pain     Added automatically from request for surgery [id number]         Procedures Performed: Cardiac catheterization performed on 8/9/2019 by Dr. Tovar; Coronary artery bypass grafting-6 vessel (left internal mammary artery/sequencing mid left anterior descending and distal left anterior descending, reverse saphenous vein graft/third diagonal artery, reverse saphenous vein graft sequencing the second obtuse marginal and third obtuse marginal, reverse saphenous vein graft/Posterior descending artery),  Right endoscopic vein harvest with open extension performed on 8/12/2019 by Dr. Blackburn.     HPI:  Mr. Piter Ro is a 74 y.o. male with salient history of hypercholesteremia, hypertension, a family history of heart failure, and previous history of chronic tobacco use presents with a previous couple week history of pressure dull achy to his back intrascapular.  He had additional new chest pain that was substernal prompting him to present to the emergency department where he was admitted..  He is chest pain-free at time of assessment.  He denies proximal nocturnal dyspnea, lower extremity edema, fatigue. Work-up demonstrated a stress echo that was concerning for ischemia with subsequent left heart catheterization demonstrating severe left main and three-vessel coronary disease. Cardiothoracic surgery was consulted for consideration of CABG. Preoperative testing was completed and he was deemed a mildly elevated risk candidate.     Hospital Course: Mr. Ro was taken to the operative suite on 8/12/2019. Please see a separate op  note by Dr. Blackburn detailing the operation. Following surgery he was transferred to the ICU in stable and guarded condition. He remained hemodynamically stable. On post op day 1 he was extubated and demonstrated a neurologically intact exam.  He was up to the chair and ambulating with PT. Neosynephrine gtt was weaned per protocol. He was kept in the ICU for close monitoring. On post op day 3, he was off Neosynephrine gtt with suitable BP. Diuresis was initiated as he was 20 pounds above baseline weight. Mediastinal tubes were removed without remark. He was transferred to  for continued recovery. The remaining stay of his hospitalization was remarkable for ongoing diuresis, encouraging pulmonary toilet and ambulation, and weaning supplemental oxygen. On post op day 7, he was at baseline weight. Roosevelt drain and pacing wires were removed without remark. He is ambulating over 200 feet with PT. He is saturating appropriately on room air. He meets criteria for discharge home and is agreeable to do so.     Condition on Discharge:  Neurologically intact and has good pain control.  He is eating well and has demonstrable good bowel function.  The sternum is stable without clicks and the saphenectomy incisions are healing nicely.  The heart is in normal sinus rhythm.  He has met all physical therapy criteria and verbalizes understanding of sternotomy precautions.   He verbalizes understanding of a separately handed out cardiac surgery handout.       Discharge Disposition:  Home or Self Care [1]    Discharge Medications:     Discharge Medications      New Medications      Instructions Start Date   ASPIRIN LOW DOSE 81 MG EC tablet  Generic drug:  aspirin  Notes to patient:  Next Dose: 8/20/2019 8am   81 mg, Oral, Daily   Start Date:  8/20/2019     atorvastatin 40 MG tablet  Commonly known as:  LIPITOR  Notes to patient:  Next Dose: 8/19/2019 8pm   40 mg, Oral, Nightly      benazepril 10 MG tablet  Commonly known as:  LOTENSIN    5 mg, Oral, Daily      clopidogrel 75 MG tablet  Commonly known as:  PLAVIX  Notes to patient:  Next Dose; 8/19/2019 5pm   75 mg, Oral, Daily      furosemide 40 MG tablet  Commonly known as:  LASIX  Notes to patient:  Next Dose; 8/20/2019 8am   40 mg, Oral, Daily      metoprolol tartrate 25 MG tablet  Commonly known as:  LOPRESSOR  Notes to patient:  Next Dose: 8/19/2019 8pm   25 mg, Oral, 2 Times Daily      oxyCODONE-acetaminophen 5-325 MG per tablet  Commonly known as:  PERCOCET   1 tablet, Oral, Every 6 Hours PRN      potassium chloride 10 MEQ CR capsule  Commonly known as:  MICRO-K  Notes to patient:  Next Dose: 8/20/2019 8am   20 mEq, Oral, Daily   Start Date:  8/20/2019        Continue These Medications      Instructions Start Date   escitalopram 10 MG tablet  Commonly known as:  LEXAPRO   10 mg, Oral, Daily      ICAPS AREDS 2 PO   Oral, 2 Times Daily         Stop These Medications    amLODIPine-benazepril 5-10 MG per capsule  Commonly known as:  LOTREL 5-10     atenolol 25 MG tablet  Commonly known as:  TENORMIN     diclofenac sodium 100 MG 24 hr tablet  Commonly known as:  VOTAREN XR     predniSONE 50 MG tablet  Commonly known as:  DELTASONE          Discharge Diet: Regular diet      Discharge Care Plan / Instructions: Please see the separately handed out cardiac surgery handout.    Activity at Discharge:   No heavy lifting greater than 5 pounds or a gallon of milk while maintaining sternal precautions.  Juanmaria VASQUEZ Ro has been instructed on an exercise  regiment as detailed in a handed out cardiac surgery handout.    Tobacco:  The patient does not use tobacco products and therefore does not need tobacco cessation education.    BMI: Patient's Body mass index is 27.38 kg/m². BMI is within normal parameters. No follow-up required.    Follow-up Appointments: Piter Ro  is requested to see Marialuisa Colunga MD within 1-2 weeks from time of discharge, to see Ethel RUIZ in 1 week, to  follow-up with Dr. Blackburn in 4 weeks,  and to follow-up with  Dr. Tovar of the cardiology service in 6 weeks.

## 2019-08-19 NOTE — PROGRESS NOTES
"CABG x 6, Right EVH with open extension    POD 7    Up in the chair. On room air. (+) BM. Ambulating well with PT. Weight down 4 pounds and at baseline weight. Eager for DC.     Telemetry: sinus 60-70s    Visit Vitals  /69 (BP Location: Left arm, Patient Position: Sitting)   Pulse 62   Temp 98.2 °F (36.8 °C) (Oral)   Resp 16   Ht 180.3 cm (70.98\")   Wt 89 kg (196 lb 3.2 oz)   SpO2 95%   BMI 27.38 kg/m²     Baseline weight 195 pounds    Intake/Output Summary (Last 24 hours) at 8/19/2019 1429  Last data filed at 8/19/2019 1300  Gross per 24 hour   Intake 490 ml   Output 1550 ml   Net -1060 ml     L Diego drain: 30 ml/24 hours    Labs:      Chest x ray: improved lung expansion, no pneumothorax, bibasilar atelectasis    Physical Exam:  General: No apparent distress. In good spirits. Up in the chair.   Cardiovascular: Regular rate and rhythm without murmur, rubs, or gallops.    Pulmonary: Clear to auscultation bilaterally without wheezing, rubs, or rales.  Chest: Sternotomy incision clean, dry, and intact. Sternum stable. No clicks. Diego drain x 1 with dressing clean, dry, and intact.   Abdomen: Soft, non-distended, and non-tender.  Extremities: Warm, moves all extremities. Saphenectomy site clean, dry, and intact.   Neurologic: Grossly intact with no focal deficits.       Impression:  Left main coronary artery disease  Unstable angina   Hypertension  Hypercholesteremia  Anemia, hemo dilutional    Plan:  DC home today   Pacing wires and diego drain removed  Encourage pulmonary toilet and ambulation  Routine post cardiac surgery regimen  DW patient and nursing    "

## 2019-08-20 ENCOUNTER — READMISSION MANAGEMENT (OUTPATIENT)
Dept: CALL CENTER | Facility: HOSPITAL | Age: 74
End: 2019-08-20

## 2019-08-20 NOTE — OUTREACH NOTE
Prep Survey      Responses   Facility patient discharged from?  [facility]   Is patient eligible?  Yes   Discharge diagnosis  CABG x6 with LIMEMILIA HEBERTH RSV   Does the patient have one of the following disease processes/diagnoses(primary or secondary)?  Cardiothoracic surgery   Does the patient have Home health ordered?  No   Is there a DME ordered?  No   Comments regarding appointments  See AVS   Prep survey completed?  Yes          Yesi Castro RN

## 2019-08-20 NOTE — THERAPY DISCHARGE NOTE
Acute Care - Physical Therapy Discharge Summary  [facility]       Patient Name: Piter Ro  : 1945  MRN: [id number]    Today's Date: 2019  Onset of Illness/Injury or Date of Surgery: 19    Date of Referral to PT: 19  Referring Physician: Dr. Blackburn      Admit Date: 2019      PT Recommendation and Plan    Visit Dx:    ICD-10-CM ICD-9-CM   1. Chest pain, unspecified type R07.9 786.50   2. Left main coronary artery disease I25.10 414.00   3. Impaired mobility Z74.09 799.89       Outcome Measures     Row Name 19 1000 19 1000          How much help from another person do you currently need...    Turning from your back to your side while in flat bed without using bedrails?  3  -NW  3  -NW     Moving from lying on back to sitting on the side of a flat bed without bedrails?  3  -NW  3  -NW     Moving to and from a bed to a chair (including a wheelchair)?  3  -NW  3  -NW     Standing up from a chair using your arms (e.g., wheelchair, bedside chair)?  3  -NW  3  -NW     Climbing 3-5 steps with a railing?  3  -NW  3  -NW     To walk in hospital room?  3  -NW  3  -NW     AM-PAC 6 Clicks Score (PT)  18  -NW  18  -NW        Functional Assessment    Outcome Measure Options  AM-PAC 6 Clicks Basic Mobility (PT)  -NW  AM-PAC 6 Clicks Basic Mobility (PT)  -NW       User Key  (r) = Recorded By, (t) = Taken By, (c) = Cosigned By    Initials Name Provider Type    NW Ling Dodge PTA Physical Therapy Assistant              Rehab Goal Summary     Row Name 19 0756             Bed Mobility Goal 1 (PT)    Activity/Assistive Device (Bed Mobility Goal 1, PT)  bed mobility activities, all  -      Butts Level/Cues Needed (Bed Mobility Goal 1, PT)  minimum assist (75% or more patient effort)  -      Time Frame (Bed Mobility Goal 1, PT)  by discharge  -      Progress/Outcomes (Bed Mobility Goal 1, PT)  goal not met  -         Transfer Goal 1 (PT)    Activity/Assistive Device  (Transfer Goal 1, PT)  sit-to-stand/stand-to-sit;bed-to-chair/chair-to-bed  -MF      Remus Level/Cues Needed (Transfer Goal 1, PT)  supervision required  -MF      Time Frame (Transfer Goal 1, PT)  by discharge  -MF      Progress/Outcome (Transfer Goal 1, PT)  goal met  -MF         Gait Training Goal 1 (PT)    Activity/Assistive Device (Gait Training Goal 1, PT)  gait (walking locomotion);improve balance and speed;increase endurance/gait distance;increase energy conservation;decrease fall risk  -MF      Remus Level (Gait Training Goal 1, PT)  supervision required  -MF      Distance (Gait Goal 1, PT)  250  -MF      Time Frame (Gait Training Goal 1, PT)  by discharge  -MF      Progress/Outcome (Gait Training Goal 1, PT)  goal partially met  -MF        User Key  (r) = Recorded By, (t) = Taken By, (c) = Cosigned By    Initials Name Provider Type Discipline    Faye Guidry, PTA Physical Therapy Assistant PT              PT Discharge Summary  Anticipated Discharge Disposition (PT): home  Reason for Discharge: Discharge from facility  Outcomes Achieved: Patient able to partially acheive established goals  Discharge Destination: Home      Faye Carcamo PTA   8/20/2019

## 2019-08-21 ENCOUNTER — READMISSION MANAGEMENT (OUTPATIENT)
Dept: CALL CENTER | Facility: HOSPITAL | Age: 74
End: 2019-08-21

## 2019-08-21 NOTE — OUTREACH NOTE
CT Surgery Week 1 Survey      Responses   Facility patient discharged from?  [facility] [place]   Does the patient have one of the following disease processes/diagnoses(primary or secondary)?  Cardiothoracic surgery   Is there a successful TCM telephone encounter documented?  No   Week 1 attempt successful?  Yes   Call start time  0921   Rescheduled  Revoked   Revoke  Decline to participate [Aware of Nurse Call Center]   Discharge diagnosis  CABG x6 with CRISSY SARAVIA RN

## 2019-08-28 ENCOUNTER — OFFICE VISIT (OUTPATIENT)
Dept: CARDIAC SURGERY | Facility: CLINIC | Age: 74
End: 2019-08-28

## 2019-08-28 VITALS
HEIGHT: 71 IN | BODY MASS INDEX: 26.99 KG/M2 | WEIGHT: 192.8 LBS | DIASTOLIC BLOOD PRESSURE: 60 MMHG | HEART RATE: 57 BPM | OXYGEN SATURATION: 98 % | SYSTOLIC BLOOD PRESSURE: 104 MMHG

## 2019-08-28 DIAGNOSIS — I25.10 LEFT MAIN CORONARY ARTERY DISEASE: Primary | ICD-10-CM

## 2019-08-28 DIAGNOSIS — I20.0 UNSTABLE ANGINA (HCC): ICD-10-CM

## 2019-08-28 DIAGNOSIS — Z87.891 FORMER SMOKER: ICD-10-CM

## 2019-08-28 DIAGNOSIS — I10 ESSENTIAL HYPERTENSION: ICD-10-CM

## 2019-08-28 PROCEDURE — 99024 POSTOP FOLLOW-UP VISIT: CPT | Performed by: NURSE PRACTITIONER

## 2019-08-28 RX ORDER — AMLODIPINE BESYLATE 10 MG/1
TABLET ORAL
COMMUNITY
Start: 2019-07-11 | End: 2019-08-29 | Stop reason: HOSPADM

## 2019-08-28 NOTE — PROGRESS NOTES
"Subjective   Chief Complaint   Patient presents with   • Post-op Follow-up     CABG x6 on 8/12       Patient ID: Piter Ro is a 74 y.o. male who is here for follow-up having had Coronary artery bypass grafting-6 vessel (left internal mammary artery/sequencing mid left anterior descending and distal left anterior descending, reverse saphenous vein graft/third diagonal artery, reverse saphenous vein graft sequencing the second obtuse marginal and third obtuse marginal, reverse saphenous vein graft/Posterior descending artery),  Right endoscopic vein harvest with open extension performed on 8/12/2019 by Dr. Blackburn.    History of Present Illness  Post operative recovery was uneventful without any major complications. He returns today for 1 week follow up. Sleep habits are fair-at baseline as he admits he has never been a good sleeper. Pain control has been great. He is not taking any pain medications. No fevers/sweats/chills. No drainage from incisions.  No sternal clicks. No chest pain or shortness of breath. Appetite is good. He is a nonsmoker. Ambulating 10-15 minutes twice daily.     The following portions of the patient's history were reviewed and updated as appropriate: allergies, current medications, past family history, past medical history, past social history, past surgical history and problem list.    Review of Systems   Constitutional: Negative for chills, diaphoresis, fatigue and fever.   Respiratory: Negative for shortness of breath and wheezing.    Cardiovascular: Positive for chest pain (incisional). Negative for palpitations and leg swelling.       Objective   Visit Vitals  /60 (BP Location: Right arm, Patient Position: Sitting, Cuff Size: Adult)   Pulse 57   Ht 180.3 cm (70.98\")   Wt 87.5 kg (192 lb 12.8 oz)   SpO2 98%   BMI 26.91 kg/m²       Physical Exam   Constitutional: He is oriented to person, place, and time. He appears well-developed and well-nourished. No distress.   HENT: "   Head: Normocephalic and atraumatic.   Eyes: Pupils are equal, round, and reactive to light.   Neck: Normal range of motion. Neck supple.   Cardiovascular: Normal rate, regular rhythm and normal heart sounds. Exam reveals no friction rub.   No murmur heard.  Pulmonary/Chest: Effort normal and breath sounds normal. No respiratory distress. He has no wheezes. He has no rales.   Abdominal: Soft. He exhibits no distension. There is no tenderness. There is no guarding.   Musculoskeletal: He exhibits no edema.   Neurological: He is alert and oriented to person, place, and time. No cranial nerve deficit.   Skin: Skin is warm and dry. No rash noted. He is not diaphoretic. No pallor.   Sternotomy site clean, dry, and intact. Sternum stable. No clicks. Saphenectomy site clean, dry, and intact.    Psychiatric: He has a normal mood and affect. His behavior is normal.   Vitals reviewed.      Assessment/Plan       Piter was seen today for post-op follow-up.    Diagnoses and all orders for this visit:    Left main coronary artery disease    Unstable angina (CMS/HCC)    Former smoker    Essential hypertension         Overall, Piter Ro is doing well. Surgical sites and clean and dry without evidence of infection.  We discussed current sternotomy precautions and how these will not be advanced yet. Following post op cardiac surgery home instructions. Provided support and encouragement. All questions have been answered to the best of my ability. Will discuss starting cardiac rehabilitation at official 1 month post op visit. Patient has follow up with Dr. Blackburn in a few weeks. Patient has follow up with Cardiology in a few weeks. Patient has been instructed to contact our office with any questions or concerns should they arise prior to the next office visit. RTC in 3 weeks to reassess cardiac rehab and release to drive.     Patient's Body mass index is 26.91 kg/m². BMI is within normal parameters. No follow-up  required.    Piter Ro is a former smoker and therefore does not need tobacco cessation education/counseling.

## 2019-08-29 ENCOUNTER — TELEPHONE (OUTPATIENT)
Dept: CARDIAC SURGERY | Facility: CLINIC | Age: 74
End: 2019-08-29

## 2019-09-18 ENCOUNTER — OFFICE VISIT (OUTPATIENT)
Dept: CARDIAC SURGERY | Facility: CLINIC | Age: 74
End: 2019-09-18

## 2019-09-18 VITALS
WEIGHT: 193.6 LBS | HEART RATE: 51 BPM | DIASTOLIC BLOOD PRESSURE: 68 MMHG | SYSTOLIC BLOOD PRESSURE: 114 MMHG | BODY MASS INDEX: 27.1 KG/M2 | HEIGHT: 71 IN | OXYGEN SATURATION: 98 %

## 2019-09-18 DIAGNOSIS — I20.0 UNSTABLE ANGINA (HCC): ICD-10-CM

## 2019-09-18 DIAGNOSIS — E78.00 HYPERCHOLESTEREMIA: ICD-10-CM

## 2019-09-18 DIAGNOSIS — I10 ESSENTIAL HYPERTENSION: ICD-10-CM

## 2019-09-18 DIAGNOSIS — I25.118 CORONARY ARTERY DISEASE OF NATIVE ARTERY OF NATIVE HEART WITH STABLE ANGINA PECTORIS (HCC): Primary | ICD-10-CM

## 2019-09-18 DIAGNOSIS — I25.10 LEFT MAIN CORONARY ARTERY DISEASE: ICD-10-CM

## 2019-09-18 DIAGNOSIS — Z87.891 FORMER SMOKER: ICD-10-CM

## 2019-09-18 PROCEDURE — 99024 POSTOP FOLLOW-UP VISIT: CPT | Performed by: NURSE PRACTITIONER

## 2019-09-18 NOTE — PROGRESS NOTES
"Subjective   Chief Complaint   Patient presents with   • Post-op Follow-up     CABG x6 on 8/12        Patient ID: Piter Ro is a 74 y.o. male who is here for follow-up having had Coronary artery bypass grafting-6 vessel (left internal mammary artery/sequencing mid left anterior descending and distal left anterior descending, reverse saphenous vein graft/third diagonal artery, reverse saphenous vein graft sequencing the second obtuse marginal and third obtuse marginal, reverse saphenous vein graft/Posterior descending artery),  Right endoscopic vein harvest with open extension performed on 8/12/2019 by Dr. Blackburn.    History of Present Illness  Post operative recovery was uneventful without any major complications. He returns today for 1 month follow up. Sleep habits are good. Pain control has been great. No fevers/sweats/chills. No drainage from incisions.  No sternal clicks. No chest pain or shortness of breath. Appetite is good. He is a nonsmoker. Ambulating 30 minutes twice daily.     The following portions of the patient's history were reviewed and updated as appropriate: allergies, current medications, past family history, past medical history, past social history, past surgical history and problem list.    Review of Systems   Constitutional: Negative for chills, diaphoresis, fatigue and fever.   Respiratory: Negative for shortness of breath and wheezing.    Cardiovascular: Negative for palpitations and leg swelling. Chest pain: incisional, improved.       Objective   Visit Vitals  /68 (BP Location: Left arm, Patient Position: Sitting, Cuff Size: Adult)   Pulse 51   Ht 180.3 cm (70.98\")   Wt 87.8 kg (193 lb 9.6 oz)   SpO2 98%   BMI 27.02 kg/m²       Physical Exam   Constitutional: He is oriented to person, place, and time. He appears well-developed and well-nourished. No distress.   HENT:   Head: Normocephalic and atraumatic.   Eyes: Pupils are equal, round, and reactive to light.   Neck: Normal " range of motion. Neck supple.   Cardiovascular: Normal rate, regular rhythm and normal heart sounds. Exam reveals no friction rub.   No murmur heard.  Pulmonary/Chest: Effort normal and breath sounds normal. No respiratory distress. He has no wheezes. He has no rales.   Abdominal: Soft. He exhibits no distension. There is no tenderness. There is no guarding.   Musculoskeletal: He exhibits no edema.   Neurological: He is alert and oriented to person, place, and time. No cranial nerve deficit.   Skin: Skin is warm and dry. No rash noted. He is not diaphoretic. No pallor.   Sternotomy site clean, dry, and intact. Sternum stable. No clicks. Saphenectomy site clean, dry, and intact.  Few scabs remaining to saphenectomy site.   Psychiatric: He has a normal mood and affect. His behavior is normal.   Vitals reviewed.      Assessment/Plan       Piter was seen today for post-op follow-up.    Diagnoses and all orders for this visit:    Coronary artery disease of native artery of native heart with stable angina pectoris (CMS/HCC)  -     Ambulatory Referral to Cardiac Rehab    Left main coronary artery disease    Unstable angina (CMS/HCC)    Former smoker    Essential hypertension    Hypercholesteremia         Overall, Piter Ro is doing well. Surgical sites and clean and dry without evidence of infection.  We discussed current sternotomy precautions and how these will be advanced over the next several weeks.  Okay to drive.  Will start cardiac rehab, he wishes to attend [facility]. Following post op cardiac surgery home instructions.  Heart healthy diet handout given to the patient and nutrition discussed.  He is eager to finish and mow his lawn-discussed when those restrictions would be up.  Provided support and encouragement. All questions have been answered to the best of my ability. Patient has follow up with Dr. Blackburn in a month. Patient has follow up with Cardiology in a few weeks. Patient has  been instructed to contact our office with any questions or concerns should they arise prior to the next office visit.     Patient's Body mass index is 27.02 kg/m². BMI is within normal parameters. No follow-up required.    Piter Ro is a former smoker and therefore does not need tobacco cessation education/counseling.

## 2019-09-18 NOTE — PATIENT INSTRUCTIONS
OK to drive car  No fishing or mowing lawn for 3 months (11/12/2019)   Advance weight by 5 pounds every 2 weeks. 10 pound weight restriction now.

## 2019-10-16 ENCOUNTER — OFFICE VISIT (OUTPATIENT)
Dept: CARDIAC SURGERY | Facility: CLINIC | Age: 74
End: 2019-10-16

## 2019-10-16 VITALS
HEIGHT: 71 IN | SYSTOLIC BLOOD PRESSURE: 118 MMHG | BODY MASS INDEX: 27.13 KG/M2 | HEART RATE: 52 BPM | OXYGEN SATURATION: 98 % | WEIGHT: 193.8 LBS | DIASTOLIC BLOOD PRESSURE: 78 MMHG

## 2019-10-16 DIAGNOSIS — I25.10 LEFT MAIN CORONARY ARTERY DISEASE: Primary | ICD-10-CM

## 2019-10-16 DIAGNOSIS — Z87.891 FORMER SMOKER: ICD-10-CM

## 2019-10-16 PROCEDURE — 99024 POSTOP FOLLOW-UP VISIT: CPT | Performed by: THORACIC SURGERY (CARDIOTHORACIC VASCULAR SURGERY)

## 2019-10-16 RX ORDER — LOSARTAN POTASSIUM 50 MG/1
TABLET ORAL
COMMUNITY
Start: 2019-10-11

## 2019-10-16 RX ORDER — MISOPROSTOL 100 UG/1
TABLET ORAL
COMMUNITY
Start: 2019-10-11

## 2019-11-17 NOTE — PROGRESS NOTES
"Subjective   Chief Complaint   Patient presents with   • Post-op Follow-up     CABG x6 on 8/12       Patient ID: Piter Ro is a 74 y.o. male who is here for follow-up having had Coronary artery bypass grafting-6 vessel (left internal mammary artery/sequencing mid left anterior descending and distal left anterior descending, reverse saphenous vein graft/third diagonal artery, reverse saphenous vein graft sequencing the second obtuse marginal and third obtuse marginal, reverse saphenous vein graft/Posterior descending artery),  Right endoscopic vein harvest with open extension performed on 8/12/2019.      History of Present Illness  Post operative recovery was uneventful without any major complications. He returns today for his formal post operative visit.  Sleeping well.  No significant pain at this time.  No fevers/sweats/chills. No drainage from incisions.  No sternal clicks. No chest pain or shortness of breath. Appetite is good. He is a nonsmoker. Ambulating 30 minutes twice daily.   He has started cardiac rehab and has made great progress.    The following portions of the patient's history were reviewed and updated as appropriate: allergies, current medications, past family history, past medical history, past social history, past surgical history and problem list.      Objective   Visit Vitals  /78 (BP Location: Right arm, Patient Position: Sitting, Cuff Size: Adult)   Pulse 52   Ht 180.3 cm (70.98\")   Wt 87.9 kg (193 lb 12.8 oz)   SpO2 98%   BMI 27.04 kg/m²       Physical Exam   Constitutional: He is oriented to person, place, and time. He appears well-developed and well-nourished. No distress.   HENT:   Head: Normocephalic and atraumatic.   Eyes: Pupils are equal, round, and reactive to light.   Neck: Normal range of motion. Neck supple.   Cardiovascular: Normal rate, regular rhythm and normal heart sounds. Exam reveals no friction rub.   No murmur heard.  Pulmonary/Chest: Effort normal and " breath sounds normal. No respiratory distress. He has no wheezes. He has no rales.   Abdominal: Soft. He exhibits no distension. There is no tenderness. There is no guarding.   Musculoskeletal: He exhibits no edema.   Neurological: He is alert and oriented to person, place, and time. No cranial nerve deficit.   Skin: Skin is warm and dry. No rash noted. He is not diaphoretic. No pallor.   Sternotomy site is well healed.  Sternum stable. No clicks. Saphenectomy site is well healed.  Previous scabs described are resolved.     Psychiatric: He has a normal mood and affect. His behavior is normal.   Vitals reviewed.      Assessment/Plan       Piter was seen today for post-op follow-up.    Diagnoses and all orders for this visit:    Left main coronary artery disease    Former smoker         Overall, Piter Ro is doing well. We discussed current sternotomy precautions and how these will be advanced over the next few months.  Heart healthy diet handout given to the patient and nutrition discussed.  We discussed the value of risk factor modification for best long term cardiovascular outcome.  Ok to care for his lawn including mowing at 3 months post operative.  All questions have been answered to the best of my ability. Patient has follow up with Dr. Blackburn in a month. He does not have a follow up with Dr. Tovar and has been encouraged to contact his office to obtain an office visit as this will be necessary for ongoing long term risk factor modification.  He is encouraged to work with his PCP as well for ongoing long term risk factor modification.    Patient's Body mass index is 27.04 kg/m². BMI is within normal parameters. No follow-up required.    Piter Ro is a former smoker and therefore does not need tobacco cessation education/counseling.      Although I have not given him a specific return to clinic appointment, should I be of further assistance in the future, please do not hesitate to contact me.

## 2019-11-20 RX ORDER — BENAZEPRIL HYDROCHLORIDE 10 MG/1
TABLET ORAL
Qty: 15 TABLET | Refills: 1 | OUTPATIENT
Start: 2019-11-20

## 2019-11-20 RX ORDER — ATORVASTATIN CALCIUM 40 MG/1
TABLET, FILM COATED ORAL
Qty: 30 TABLET | Refills: 1 | OUTPATIENT
Start: 2019-11-20

## 2019-11-20 RX ORDER — CLOPIDOGREL BISULFATE 75 MG/1
TABLET ORAL
Qty: 30 TABLET | Refills: 1 | OUTPATIENT
Start: 2019-11-20

## 2019-12-26 NOTE — THERAPY TREATMENT NOTE
Acute Care - Physical Therapy Treatment Note  [facility]     Patient Name: Piter Ro  : 1945  MRN: [id number]  Today's Date: 2019  Onset of Illness/Injury or Date of Surgery: 19  Date of Referral to PT: 19  Referring Physician: Dr. Blackburn    Admit Date: 2019    Visit Dx:    ICD-10-CM ICD-9-CM   1. Chest pain, unspecified type R07.9 786.50   2. Left main coronary artery disease I25.10 414.00   3. Impaired mobility Z74.09 799.89     Patient Active Problem List   Diagnosis   • Chest pain   • Left main coronary artery disease   • Unstable angina (CMS/HCC)   • Hypertension   • Hypercholesteremia       Therapy Treatment    Rehabilitation Treatment Summary     Row Name 19             Treatment Time/Intention    Discipline  physical therapy assistant  -AE      Document Type  therapy note (daily note)  -AE      Subjective Information  complains of;weakness;pain  -AE      Existing Precautions/Restrictions  cardiac;fall;sternal  -AE      Recorded by [AE] Lilia Richards, [facility] 19      Row Name 19             Vital Signs    Pretreatment Heart Rate (beats/min)  89  -AE      Posttreatment Heart Rate (beats/min)  86  -AE      Pre SpO2 (%)  97  -AE      O2 Delivery Pre Treatment  room air  -AE      Post SpO2 (%)  96  -AE      O2 Delivery Post Treatment  room air  -AE      Recorded by [AE] Lilia Richards [facility] 19      Row Name 19             Bed Mobility Assessment/Treatment    Comment (Bed Mobility)  up in chair  -AE      Recorded by [AE] Lilia Richards PTA 19      Row Name 19             Sit-Stand Transfer    Sit-Stand [place] (Transfers)  supervision  -AE      Recorded by [AE] Lilia Richards PTA 19      Row Name 19             Stand-Sit Transfer    Stand-Sit [place] (Transfers)  supervision  -AE      Recorded by [AE] Lilia Richards PTA 19      Row Name 19     Consultation - UROLOGY  Myra Ludwig 78 y o  male MRN: [id number]  Unit/Bed#: 426-01 Encounter: [id number]    Assessment/Plan     Assessment:  Urinary retention, acute  Patient required urinary straight catheterization 4 times after repeated bladder scans all in excess of 400 mL of urine since yesterday  Probable bladder outlet obstruction secondary to BPH  Urine culture did not show any growth after 72 hours  Marked bladder distention on CT scan of abdomen and pelvis done on admission  Patient had acute kidney injury on admission, was 1 65 on admission now resolved with creatinine this morning 0 86  Likely secondary to urinary retention  Patient with acute metabolic encephalopathy  Possible secondary to acute urinary tract infection, patient did receive IV ceftriaxone in the ED  Urine and blood cultures were negative  Advanced dementia, difficult to ascertain if this is the patient's baseline  Dysarthria, negative CTA other than chronic small vessel ischemic changes  Plan:  Repeated bladder scan at this time, 426 mL of urine  Review of Network urinary retention protocol was conducted  The patient needs placement of an indwelling urinary catheter at this time  No antibiotics needed as urine culture did not show any growth after 72 hours  Do not institute nurse removal protocol, urinary catheter is needed as urological requirement  Discussed with the attending hospitalist provider, will start the patient on tamsulosin daily, 0 4 mg  Camargo catheter to remain for 1 week, plan for skilled nursing facility transfer likely will be done before then    The patient will need to be followed up as an outpatient in the Urology office in 1 week for catheter removal and voiding trial   Office telephone for Urology is [phone]    History of Present Illness   History, ROS and PFSH unobtainable from any source due to patient with expressive aphasia    HPI:  Myra Ludwig is a 78 y o  male who was asked to be seen in          Gait/Stairs Assessment/Training    [place] Level (Gait)  stand by assist  -AE      Distance in Feet (Gait)  300  -AE      Pattern (Gait)  step-through  -AE      Deviations/Abnormal Patterns (Gait)  joselito decreased  -AE      Bilateral Gait Deviations  forward flexed posture  -AE      [place] Level (Stairs)  contact guard  -AE      Handrail Location (Stairs)  right side (ascending)  -AE      Number of Steps (Stairs)  10  -AE      Recorded by [AE] Lilia Richards PTA 08/19/19 0951      Row Name 08/19/19 0903             Therapeutic Exercise    Upper Extremity (Therapeutic Exercise)  bicep curl, bilateral  -AE      Lower Extremity (Therapeutic Exercise)  LAQ (long arc quad), bilateral  -AE      Lower Extremity Range of Motion (Therapeutic Exercise)  ankle dorsiflexion/plantar flexion, bilateral  -AE      Exercise Type (Therapeutic Exercise)  AROM (active range of motion)  -AE      Position (Therapeutic Exercise)  seated  -AE      Comment (Therapeutic Exercise)  cardiac protocol  -AE      Recorded by [AE] Lilia Richards PTA 08/19/19 0951      Row Name 08/19/19 0903             Positioning and Restraints    Pre-Treatment Position  sitting in chair/recliner  -AE      Post Treatment Position  chair  -AE      In Chair  sitting;call light within reach  -AE      Recorded by [AE] Lilia Richards PTA 08/19/19 0951      Row Name 08/19/19 0903             Pain Scale: Numbers Pre/Post-Treatment    Pain Scale: Numbers, Pretreatment  2/10  -AE      Pain Scale: Numbers, Post-Treatment  2/10  -AE      Pain Location - Orientation  incisional  -AE      Pain Location  chest  -AE      Pain Intervention(s)  Medication (See MAR);Repositioned  -AE      Recorded by [AE] Lilia Richards PTA 08/19/19 0951      Row Name                Wound 08/12/19 1312 chest Incision    Wound - Properties Group Date first assessed: 08/12/19 [TM] Time first assessed: 1312 [TM] Location: chest [TM] Primary Wound Type: Incision [TM]  Urology consultation because of urinary retention  The patient has required for urinary straight catheterizations since yesterday  He has had repeated bladder scans all with residual urine more than 400  Bladder scan this morning was 566 at 5:23 a m  [place] Repeated just now bladder scan randomly performed was 426 mL of urine  Nursing notes that the patient has not voided urine on his own since last straight catheterization 4 hours ago  The patient has confusion and does not provide any history  Patient presented to the emergency department here 4 days ago  He is found to have acute cystitis with complaints of urinary retention and suprapubic pain  Urinalysis showed 4-10 white blood cells and 0 bacteria with moderate blood and leukocytes present  Patient was started on IV ceftriaxone in the ED  Urine cultures did not reflex  Camargo catheter was initially placed though discontinued and at that time the patient was voiding on his own until yesterday he went into acute urinary retention and required 4 separate straight catheterizations  CT scan of the abdomen and pelvis was performed from admission and showed that the patient had marked bladder distention  He also had acute kidney injury on admission which is now resolved  Patient admitted with confusion, evaluation for acute stroke was negative  The patient did have chronic changes and possible chronic lacunar infarct on MRI of the brain  Inpatient consult to Urology  Consult performed by: Jennifer Tellez PA-C  Consult ordered by: Gricel Angel PA-C        Review of Systems   Twelve point review of systems was difficult to obtain from the patient due to his confusion and he is not deemed reliable as historian  Historical Information   Past Medical History:   Diagnosis Date    GERD (gastroesophageal reflux disease)     Hypertension      History reviewed  No pertinent surgical history    Social History   Social History     Substance and Sexual Activity   Alcohol Use Never    Frequency: Never     Social History     Substance and Sexual Activity   Drug Use Never     Social History     Tobacco Use   Smoking Status Former Smoker   Smokeless Tobacco Never Used     Family History: Unobtainable from the patient due to confusion      Meds/Allergies   current meds:   Current Facility-Administered Medications   Medication Dose Route Frequency    aspirin chewable tablet 81 mg  81 mg Oral Daily    atorvastatin (LIPITOR) tablet 40 mg  40 mg Oral QPM    enoxaparin (LOVENOX) subcutaneous injection 40 mg  40 mg Subcutaneous Q24H [address]    iohexol (OMNIPAQUE) 350 MG/ML injection (SINGLE-DOSE) 100 mL  100 mL Intravenous Once in imaging    iohexol (OMNIPAQUE) 350 MG/ML injection (SINGLE-DOSE) 70 mL  70 mL Intravenous Once in imaging    ondansetron (ZOFRAN) injection 4 mg  4 mg Intravenous Q6H PRN    tamsulosin (FLOMAX) capsule 0 4 mg  0 4 mg Oral Daily With Dinner     Allergies   Allergen Reactions    Penicillins        Objective   First Vitals:   Blood Pressure: 157/74 (12/22/19 1618)  Pulse: 91 (12/22/19 1618)  Temperature: 99 6 °F (37 6 °C) (12/22/19 1618)  Temp Source: Oral (12/22/19 1618)  Respirations: 14 (12/22/19 1618)  Height: 5' 10" (177 8 cm) (12/23/19 0034)  Weight - Scale: 76 3 kg (168 lb 3 4 oz) (12/22/19 1617)  SpO2: 95 % (12/22/19 1618)    Current Vitals:   Blood Pressure: 119/67 (12/26/19 0736)  Pulse: 91 (12/26/19 0736)  Temperature: 98 7 °F (37 1 °C) (12/25/19 2207)  Temp Source: Oral (12/24/19 2356)  Respirations: 18 (12/26/19 0736)  Height: 5' 10" (177 8 cm) (12/23/19 1507)  Weight - Scale: 70 1 kg (154 lb 8 7 oz) (12/26/19 0600)  SpO2: 96 % (12/26/19 0736)      Intake/Output Summary (Last 24 hours) at 12/26/2019 0859  Last data filed at 12/26/2019 0530  Gross per 24 hour   Intake 380 ml   Output 1675 ml   Net -1295 ml       Invasive Devices     Peripheral Intravenous Line            Peripheral IV 12/23/19 Left;Dorsal (posterior) Forearm 2 days Recorded by:  [TM] Winnie Nagy RN 08/12/19 1312    Row Name                Wound 08/12/19 1312 Right leg Incision    Wound - Properties Group Date first assessed: 08/12/19 [TM] Time first assessed: 1312 [TM] Side: Right [TM] Location: leg [TM] Primary Wound Type: Incision [TM] Recorded by:  [TM] Winnie Nagy RN 08/12/19 1312      User Key  (r) = Recorded By, (t) = Taken By, (c) = Cosigned By    Initials Name Effective Dates Discipline    AE Lilia Richards, FRANKI 06/22/15 -  PT    TM Winnie Nagy RN 07/26/16 -  Nurse          Wound 08/12/19 1312 chest Incision (Active)   Dressing Appearance open to air 8/19/2019  7:56 AM   Closure Liquid skin adhesive;Approximated 8/19/2019  7:56 AM   Base clean;dry;pink 8/18/2019  8:00 PM   Periwound dry;intact 8/19/2019  7:56 AM   Periwound Temperature warm 8/19/2019  7:56 AM   Periwound Skin Turgor soft 8/19/2019  7:56 AM   Drainage Amount none 8/19/2019  7:56 AM   Dressing Care, Wound gauze 8/18/2019  8:00 PM       Wound 08/12/19 1312 Right leg Incision (Active)   Dressing Appearance open to air 8/19/2019  7:56 AM   Closure Approximated;Liquid skin adhesive 8/19/2019  7:56 AM   Base clean;dry 8/19/2019  7:56 AM   Periwound intact;dry;pink 8/19/2019  7:56 AM   Periwound Temperature warm 8/19/2019  7:56 AM   Periwound Skin Turgor soft 8/19/2019  7:56 AM   Drainage Amount none 8/19/2019  7:56 AM           Physical Therapy Education     Title: PT OT SLP Therapies (Done)     Topic: Physical Therapy (Done)     Point: Mobility training (Done)     Learning Progress Summary           Patient Acceptance, E, VU by AE at 8/19/2019  9:51 AM    Comment:  ex's, stairs    Acceptance, E,TB, VU by AH at 8/14/2019  8:58 AM    Comment:  bed mobility    Acceptance, E, VU by SB at 8/13/2019 11:54 AM    Comment:  pt edu on POC, benefits of act, d/c plans, warm up/cool down exercises and sternal precautions                   Point: Home exercise program (Done)     Learning  Physical Exam     Patient is awake and in no apparent distress  He is set up in bedside chair  He is confused x2 spheres, he does recall his name  Speech is slow and he does exhibit some expressive aphasia  Head normocephalic  Tongue protrudes in midline  The right eyebrow is higher than left  Oral mucosa is pink and moist   Neck supple  Heart regular rate and rhythm without murmur gallop  Lungs clear auscultation  Back no CVA tenderness to percussion  Abdomen no scars in noted  The abdomen is soft  There is some suprapubic distention and mild tenderness to palpation without definite masses  Inguinal sites are free of hernia  Normal male genitalia  Testicles present bilaterally  No scrotal edema is present  No peripheral edema  No tremor noted  Ambulation could not be observed  He moves all 4 extremities with command  Lab Results:   I have personally reviewed pertinent lab results  , CBC:   Lab Results   Component Value Date    WBC 8 40 12/26/2019    HGB 13 8 12/26/2019    HCT 40 9 12/26/2019    MCV 91 12/26/2019     12/26/2019    MCH 30 7 12/26/2019    MCHC 33 7 12/26/2019    RDW 12 2 12/26/2019    MPV 10 1 12/26/2019   , CMP:   Lab Results   Component Value Date    SODIUM 144 12/26/2019    K 3 4 (L) 12/26/2019     (H) 12/26/2019    CO2 24 12/26/2019    BUN 18 12/26/2019    CREATININE 0 86 12/26/2019    CALCIUM 8 7 12/26/2019    EGFR 85 12/26/2019   , Coagulation: No results found for: PT, INR, APTT, Urinalysis: No results found for: Deyanne Los, SPECGRAV, PHUR, LEUKOCYTESUR, NITRITE, PROTEINUA, GLUCOSEU, [place]em [place], BILIRUBINUR, BLOODU     Blood culture #1 & 2 SEPERATE  Order: [id number]   Status:  Preliminary result   Visible to patient:  No (Not Released)   Next appt:  None   Specimen Information: Arm, Left; Blood        Blood Culture No Growth at 72 hrs                  Specimen Collected: 12/22/19 19:05   Last Resulted: 12/26/19 08:02           Urine culture Progress Summary           Patient Acceptance, E, VU by  at 8/19/2019  9:51 AM    Comment:  ex's, stairs    Acceptance, E, VU by SB at 8/13/2019 11:54 AM    Comment:  pt edu on POC, benefits of act, d/c plans, warm up/cool down exercises and sternal precautions                   Point: Body mechanics (Done)     Learning Progress Summary           Patient Acceptance, E, VU by SB at 8/13/2019 11:54 AM    Comment:  pt edu on POC, benefits of act, d/c plans, warm up/cool down exercises and sternal precautions                   Point: Precautions (Done)     Learning Progress Summary           Patient Acceptance, E,TB, VU by  at 8/15/2019  8:52 AM    Comment:  trans    Acceptance, E, VU by SB at 8/13/2019 11:54 AM    Comment:  pt edu on POC, benefits of act, d/c plans, warm up/cool down exercises and sternal precautions                               User Key     Initials Effective Dates Name Provider Type Discipline     06/22/15 -  Lilia Richards, PTA Physical Therapy Assistant PT     08/02/16 -  Clarisa Chavarria, PTA Physical Therapy Assistant PT     08/31/18 -  Jocelynn Robledo, PT Physical Therapist PT                PT Recommendation and Plan        Outcome Measures     Row Name 08/18/19 1000 08/17/19 1000          How much help from another person do you currently need...    Turning from your back to your side while in flat bed without using bedrails?  3  -NW  3  -NW     Moving from lying on back to sitting on the side of a flat bed without bedrails?  3  -NW  3  -NW     Moving to and from a bed to a chair (including a wheelchair)?  3  -NW  3  -NW     Standing up from a chair using your arms (e.g., wheelchair, bedside chair)?  3  -NW  3  -NW     Climbing 3-5 steps with a railing?  3  -NW  3  -NW     To walk in hospital room?  3  -NW  3  -NW     AM-PAC 6 Clicks Score (PT)  18  -NW  18  -NW        Functional Assessment    Outcome Measure Options  AM-PAC 6 Clicks Basic Mobility (PT)  -NW  AM-PAC 6 Clicks Basic  Mobility (PT)  -NW       User Key  (r) = Recorded By, (t) = Taken By, (c) = Cosigned By    Initials Name Provider Type    Ling Araiza, FRANKI Physical Therapy Assistant         Time Calculation:   PT Charges     Row Name 08/19/19 0952             Time Calculation    Start Time  0903  -AE      Stop Time  0926  -AE      Time Calculation (min)  23 min  -AE      PT Received On  08/19/19  -AE      PT Goal Re-Cert Due Date  08/23/19  -AE         Time Calculation- PT    Total Timed Code Minutes- PT  23 minute(s)  -AE         Timed Charges    19646 - PT Therapeutic Exercise Minutes  8  -AE      99215 - Gait Training Minutes   15  -AE        User Key  (r) = Recorded By, (t) = Taken By, (c) = Cosigned By    Initials Name Provider Type    AE Lilia Richards PTA Physical Therapy Assistant        Therapy Charges for Today     Code Description Service Date Service Provider Modifiers Qty    40816687722 HC GAIT TRAINING EA 15 MIN 8/19/2019 Lilia Richards, FRANKI GP 1    37131217840 HC PT THER PROC EA 15 MIN 8/19/2019 Lilia Richards PTA GP 1          PT G-Codes  Outcome Measure Options: AM-PAC 6 Clicks Basic Mobility (PT)  AM-PAC 6 Clicks Score (PT): 18    Lilia Richards PTA  8/19/2019        Order: [id number]   Status:  Final result   Visible to patient:  No (Inaccessible in [address])   Next appt:  None   Specimen Information: Urine, Indwelling Camargo Catheter        Urine Culture No Growth <1000 cfu/mL                Specimen Collected: 12/24/19 16:09   Last Resulted: 12/25/19 17:59               Imaging: I have personally reviewed pertinent reports  CT CHEST, ABDOMEN AND PELVIS WITHOUT IV CONTRAST     INDICATION:   trauma, falls      COMPARISON:  None      TECHNIQUE: CT examination of the chest, abdomen and pelvis was performed without intravenous contrast   Axial, sagittal, and coronal 2D reformatted images were created from the source data and submitted for interpretation       Radiation dose length product (DLP) for this visit:  674 mGy-cm   This examination, like all CT scans performed in the [facility], was performed utilizing techniques to minimize radiation dose exposure, including the use of iterative   reconstruction and automated exposure control       Enteric contrast was not administered       FINDINGS:     CHEST     LUNGS:  Scattered dependent atelectatic changes with no tracheal or endobronchial lesions  Debris in the proximal trachea to the right of midline likely mucous secretions      PLEURA:  Unremarkable      HEART/GREAT VESSELS:  Unremarkable for patient's age      MEDIASTINUM AND SELVIN:  No pathologic lymphadenopathy  Small hiatal hernia      CHEST WALL AND LOWER NECK:   Unremarkable      ABDOMEN     LIVER/BILIARY TREE:  Unremarkable      GALLBLADDER:  No calcified gallstones   No pericholecystic inflammatory change      SPLEEN:  Unremarkable      PANCREAS:  Unremarkable      ADRENAL GLANDS:  Unremarkable      KIDNEYS/URETERS:  Bilateral parapelvic cysts with no hydronephrosis or perinephric collections      STOMACH AND BOWEL:  There is abundant fecal debris in the rectum with thickening and mild inflammatory changes in the perirectal fat consistent with stercoral colitis  No bowel obstruction      APPENDIX:  A normal appendix was visualized      ABDOMINOPELVIC CAVITY:  No ascites or free intraperitoneal air  No lymphadenopathy      VESSELS:  Atherosclerotic changes are present  No evidence of aneurysm      PELVIS     REPRODUCTIVE ORGANS:  Unremarkable for patient's age      URINARY BLADDER:  Diffuse bladder distention with no wall thickening or perivesical inflammation      ABDOMINAL WALL/INGUINAL REGIONS:  Unremarkable      OSSEOUS STRUCTURES:  No acute fracture or destructive osseous lesion  Compression fracture at the superior endplate of L3 likely chronic with no adjacent soft tissue hematoma  Healed fracture deformity of the left lateral 9th rib noted      IMPRESSION:     1  No definite acute visceral and/or osseous injury in the chest, abdomen, or pelvis  2   Debris in the proximal trachea likely mucous secretions  3   Abundant stool in the rectum with secondary stercoral colitis  4   Marked bladder distention  EKG, Pathology, and Other Studies: I have personally reviewed pertinent reports  Counseling / Coordination of Care  Total floor / unit time spent today 50 minutes  Greater than 50% of total time was spent with the patient and / or family counseling and / or coordination of care  A description of the counseling / coordination of care:  Review of the patient's past medical records, existing documentation during this hospital stay, review of laboratory studies and diagnostic imaging, discussion with the attending hospitalist provider, and personal examination the patient all conducted        Zlotan Gaspar PA-C

## 2020-01-27 ENCOUNTER — TELEPHONE (OUTPATIENT)
Dept: CARDIOLOGY | Facility: CLINIC | Age: 75
End: 2020-01-27

## 2020-01-27 NOTE — TELEPHONE ENCOUNTER
Patient will be having a trigger finger release done under local on 01/29/2020.  Are you ok with this?  They are not requiring him to hold his ASA or Plavix.

## 2020-01-28 NOTE — TELEPHONE ENCOUNTER
PT IS S/P CABG X 6 (8/12/2019) - YOU CATHED PT PRIOR ON 8/9/19. DONT BELIEVE WE HAVE EVER SEEN HIM IN OFFICE. LAST F/U WAS WITH DR VICKESR ON 10/16/19.    SHOULD CT ADVISE ON CLEARANCE FOR UPCOMING TRIGGER FINGER RELEASE?

## 2020-01-29 NOTE — TELEPHONE ENCOUNTER
Placed a call to pt regarding clearance & need for appt. He states that someone from our office called him & told him he could proceed. Surgery was yesterday.    Pt states that he has a f/u with Francia in the [place] office this March. He will ask for cardiac clearance for a colonoscopy at that time.

## 2022-07-12 NOTE — PROGRESS NOTES
Continued Stay Note   Lori     Patient Name: Piter Ro  MRN: [id number]  Today's Date: 8/19/2019    Admit Date: 8/8/2019    Discharge Plan     Row Name 08/19/19 0948       Plan    Plan  HOME    Patient/Family in Agreement with Plan  yes    Plan Comments  REVIEWED CHART; NOTED PT. WORKING WITH THERAPY AND STILL PLANS HOME WITH SPOUSE AT D/C.  WILL CONT. TO FOLLOW FOR ANY D/C PLANNING NEEDS THAT MAY ARISE.          Discharge Codes    No documentation.             RYLAN Urbina     positive stress test, needs cath

## 2025-05-18 NOTE — THERAPY TREATMENT NOTE
Acute Care - Physical Therapy Treatment Note  [facility]     Patient Name: Piter Ro  : 1945  MRN: [id number]  Today's Date: 8/15/2019  Onset of Illness/Injury or Date of Surgery: 19  Date of Referral to : 19  Referring Physician: Dr. Blackburn    Admit Date: 2019    Visit Dx:    ICD-10-CM ICD-9-CM   1. Chest pain, unspecified type R07.9 786.50   2. Left main coronary artery disease I25.10 414.00   3. Impaired mobility Z74.09 799.89     Patient Active Problem List   Diagnosis   • Chest pain   • Left main coronary artery disease   • Unstable angina (CMS/HCC)   • Hypertension   • Hypercholesteremia       Therapy Treatment    Rehabilitation Treatment Summary     Row Name 08/15/19 1445 08/15/19 0750          Treatment Time/Intention    Discipline  physical therapy assistant  -  physical therapy assistant  -     Document Type  therapy note (daily note)  -AH2  therapy note (daily note)  -2     Subjective Information  complains of;weakness;fatigue  -2  complains of;weakness;fatigue;pain  -2     Existing Precautions/Restrictions  cardiac;fall;sternal;oxygen therapy device and L/min chest tube  -2  cardiac;fall;sternal;oxygen therapy device and L/min chest tube  -3     Recorded by [AH] Clarisa Chavarria, [facility] 08/15/19 1445  [AH2] Clarisa Chavarria, [facility] 08/15/19 1509 [AH] Clarisa Chavarria, [facility] 08/15/19 0752  [AH2] Clarisa Chavarria, [facility] 08/15/19 0819  [AH3] Clarisa Chavarria, [facility] 08/15/19 0823     Row Name 08/15/19 1445 08/15/19 0750          Vital Signs    Pre Systolic BP Rehab  --  110  -AH     Pre Treatment Diastolic BP  --  63  -AH     Post Systolic BP Rehab  --  126  -AH2     Post Treatment Diastolic BP  --  65  -AH2     Pretreatment Heart Rate (beats/min)  80  -AH  78  -AH     Posttreatment Heart Rate (beats/min)  83  -AH  84  -AH2     Pre SpO2 (%)  93  -AH  93  -AH     O2 Delivery Pre Treatment  room air  -  nasal cannula 2L  -AH     Post SpO2 (%)  97  -AH  94  -AH2     O2 Delivery  Post Treatment  room air  -AH  nasal cannula  -AH2     Recorded by [] Clarisa Chavarria, PTA 08/15/19 1509 [] Clarisa Chavarria, PTA 08/15/19 0752  [AH2] Clarisa Chavarria, PTA 08/15/19 0819     Row Name 08/15/19 1445 08/15/19 0750          Bed Mobility Assessment/Treatment    Comment (Bed Mobility)  CHAIR  -AH  chair  -AH     Recorded by [] Clarisa Chavarria, PTA 08/15/19 1509 [] Clarisa Chavarria, PTA 08/15/19 0846     Row Name 08/15/19 1445 08/15/19 0750          Sit-Stand Transfer    Sit-Stand [place] (Transfers)  contact guard  -  contact guard;2 person assist  -AH     Recorded by [] Clarisa Chavarria, PTA 08/15/19 1509 [] Clarisa Chavarria, PTA 08/15/19 0846     Row Name 08/15/19 1445 08/15/19 0750          Stand-Sit Transfer    Stand-Sit [place] (Transfers)  contact guard  -  contact guard;2 person assist  -AH     Recorded by [] Clarisa Chavarria, PTA 08/15/19 1509 [] Clarisa Chavarria, PTA 08/15/19 0846     Row Name 08/15/19 1445 08/15/19 0750          Gait/Stairs Assessment/Training    [place] Level (Gait)  contact guard  -  contact guard;2 person assist;1 person to manage equipment  -AH     Distance in Feet (Gait)  200  -AH  200  -AH     Deviations/Abnormal Patterns (Gait)  joselito decreased;stride length decreased  -AH  joselito decreased;stride length decreased  -AH     Recorded by [] Clarisa Chavarria, PTA 08/15/19 1509 [] Clarisa Chavarria, PTA 08/15/19 0846     Row Name 08/15/19 1445 08/15/19 0750          Therapeutic Exercise    Comment (Therapeutic Exercise)  cardiac protocol x 20 reps  -AH  cardiac protocol  -AH     Recorded by [] Clarisa Chavarria, PTA 08/15/19 1509 [] Clarisa Chavarria, PTA 08/15/19 0846     Row Name 08/15/19 1445 08/15/19 0750          Positioning and Restraints    Pre-Treatment Position  sitting in chair/recliner  -AH  sitting in chair/recliner  -AH     Post Treatment Position  chair  -AH  chair  -AH     In Chair  sitting;call light within reach;encouraged to  call for assist;with family/caregiver;with nsg  -  sitting;call light within reach;encouraged to call for assist  -     Recorded by [] Clarisa Chavarria, PTA 08/15/19 1509 [] Clarisa Chavarria, PTA 08/15/19 0846     Row Name 08/15/19 1445 08/15/19 0750          Pain Scale: Numbers Pre/Post-Treatment    Pain Scale: Numbers, Pretreatment  4/10  -AH  4/10  -AH     Pain Scale: Numbers, Post-Treatment  4/10  -AH  4/10  -AH     Pain Location  back  -AH  back  -AH     Pain Intervention(s)  Repositioned;Ambulation/increased activity  -  Repositioned;Medication (See MAR);Ambulation/increased activity  -     Recorded by [] Clarisa Chavarria, PTA 08/15/19 1509 [] Clarisa Chavarria, PTA 08/15/19 0846     Row Name                Wound 08/12/19 1312 chest Incision    Wound - Properties Group Date first assessed: 08/12/19 [TM] Time first assessed: 1312 [TM] Location: chest [TM] Primary Wound Type: Incision [TM] Recorded by:  [TM] Winnie Nagy RN 08/12/19 1312    Row Name                Wound 08/12/19 1312 Right leg Incision    Wound - Properties Group Date first assessed: 08/12/19 [TM] Time first assessed: 1312 [TM] Side: Right [TM] Location: leg [TM] Primary Wound Type: Incision [TM] Recorded by:  [TM] Winnie Nagy RN 08/12/19 1312      User Key  (r) = Recorded By, (t) = Taken By, (c) = Cosigned By    Initials Name Effective Dates Discipline     Clarisa Chavarria, PTA 08/02/16 -  PT    TM Winnie Nagy RN 07/26/16 -  Nurse          Wound 08/12/19 1312 chest Incision (Active)   Dressing Appearance open to air 8/15/2019 11:25 AM   Closure Liquid skin adhesive 8/15/2019 11:25 AM   Base clean;dry;pink 8/15/2019 11:25 AM   Drainage Amount none 8/15/2019 11:25 AM       Wound 08/12/19 1312 Right leg Incision (Active)   Dressing Appearance open to air 8/15/2019 11:25 AM   Closure Liquid skin adhesive 8/15/2019 11:25 AM   Base clean;pink 8/15/2019 11:25 AM   Drainage Amount scant 8/15/2019 11:25 AM   Care,  Wound other (see comments) 8/14/2019  8:00 PM   Dressing Care, Wound dressing reinforced 8/15/2019 11:25 AM           Physical Therapy Education     Title: PT OT SLP Therapies (Done)     Topic: Physical Therapy (Done)     Point: Mobility training (Done)     Learning Progress Summary           Patient Acceptance, E,TB, VU by  at 8/14/2019  8:58 AM    Comment:  bed mobility    Acceptance, E, VU by SB at 8/13/2019 11:54 AM    Comment:  pt edu on POC, benefits of act, d/c plans, warm up/cool down exercises and sternal precautions                   Point: Home exercise program (Done)     Learning Progress Summary           Patient Acceptance, E, VU by SB at 8/13/2019 11:54 AM    Comment:  pt edu on POC, benefits of act, d/c plans, warm up/cool down exercises and sternal precautions                   Point: Body mechanics (Done)     Learning Progress Summary           Patient Acceptance, E, VU by SB at 8/13/2019 11:54 AM    Comment:  pt edu on POC, benefits of act, d/c plans, warm up/cool down exercises and sternal precautions                   Point: Precautions (Done)     Learning Progress Summary           Patient Acceptance, E,TB, VU by  at 8/15/2019  8:52 AM    Comment:  trans    Acceptance, E, VU by SB at 8/13/2019 11:54 AM    Comment:  pt edu on POC, benefits of act, d/c plans, warm up/cool down exercises and sternal precautions                               User Key     Initials Effective Dates Name Provider Type Discipline     08/02/16 -  Clarisa Chavarria PTA Physical Therapy Assistant PT     08/31/18 -  Jocelynn Robledo PT Physical Therapist PT                PT Recommendation and Plan     Plan of Care Reviewed With: patient  Progress: improving  Outcome Summary: pt in chair,performed warm up exercises, sit-stand cga 2, pt amb 200 feet cga, trans back to chair, cool down exercises  Outcome Measures     Row Name 08/15/19 0800 08/14/19 0900 08/13/19 1100       How much help from another person do you  currently need...    Turning from your back to your side while in flat bed without using bedrails?  3  -AH  3  -AH  3  -SB    Moving from lying on back to sitting on the side of a flat bed without bedrails?  3  -AH  3  -AH  2  -SB    Moving to and from a bed to a chair (including a wheelchair)?  3  -AH  3  -AH  3  -SB    Standing up from a chair using your arms (e.g., wheelchair, bedside chair)?  3  -AH  3  -AH  3  -SB    Climbing 3-5 steps with a railing?  2  -AH  2  -AH  2  -SB    To walk in hospital room?  3  -AH  3  -AH  3  -SB    AM-PAC 6 Clicks Score (PT)  17  -AH  17  -AH  16  -SB       Functional Assessment    Outcome Measure Options  AM-PAC 6 Clicks Basic Mobility (PT)  -AH  AM-PAC 6 Clicks Basic Mobility (PT)  -AH  AM-PAC 6 Clicks Basic Mobility (PT)  -SB      User Key  (r) = Recorded By, (t) = Taken By, (c) = Cosigned By    Initials Name Provider Type    Clarisa Taylor PTA Physical Therapy Assistant    SB Jocelynn Robledo, PT Physical Therapist         Time Calculation:   PT Charges     Row Name 08/15/19 1509 08/15/19 0853          Time Calculation    Start Time  1445  -  0750  -     Stop Time  1508  -  0820  -     Time Calculation (min)  23 min  -  30 min  -     PT Received On  --  08/15/19  -        Time Calculation- PT    Total Timed Code Minutes- PT  23 minute(s)  -  30 minute(s)  -        Timed Charges    91254 - Gait Training Minutes   23  -AH  30  -       User Key  (r) = Recorded By, (t) = Taken By, (c) = Cosigned By    Initials Name Provider Type    Clarisa Taylor PTA Physical Therapy Assistant        Therapy Charges for Today     Code Description Service Date Service Provider Modifiers Qty    55840086134 HC GAIT TRAINING EA 15 MIN 8/14/2019 Clarisa Chavarria, FRANKI GP 2    03489732372 HC GAIT TRAINING EA 15 MIN 8/15/2019 Clarisa Chavarria, PTA GP 2    [id number] HC GAIT TRAINING EA 15 MIN 8/15/2019 Clarisa Chavarria, FRANKI GP 2          PT G-Codes  Outcome Measure Options:  -[facility] 6 Clicks Basic Mobility (PT)  -[facility] 6 Clicks Score (PT): 17    Clarisa Chavarria, PTA  8/15/2019        Statement Selected

## (undated) DEVICE — RESERVOIR,SUCTION,100CC,SILICONE: Brand: MEDLINE

## (undated) DEVICE — SOL IRR NACL 0.9PCT BT 1000ML

## (undated) DEVICE — BLAKE SILICONE DRAIN, 19 FR ROUND, HUBLESS WITH 1/4" BENDABLE TROCAR: Brand: BLAKE

## (undated) DEVICE — STERNUM BLADE, OFFSET (32.0 X 0.8 X 6.3MM)

## (undated) DEVICE — BLD SCLPL BEAVR MINI STR 2BVL 180D LF

## (undated) DEVICE — PK TURNOVER RM ADV

## (undated) DEVICE — FR5 INFINITI MULTIPAC: Brand: INFINITI

## (undated) DEVICE — INTENDED FOR TISSUE SEPARATION, AND OTHER PROCEDURES THAT REQUIRE A SHARP SURGICAL BLADE TO PUNCTURE OR CUT.: Brand: BARD-PARKER ® STAINLESS STEEL BLADES

## (undated) DEVICE — SUT STL 2/0 CV SH 24IN TPW32

## (undated) DEVICE — SUT SILK 2/0 FS BLK 18IN 685G

## (undated) DEVICE — BLAKE SILICONE DRAINS CARDIO CONNECTOR 2:1: Brand: BLAKE

## (undated) DEVICE — CANN CO2/O2 NASL A/

## (undated) DEVICE — SOLIDIFIER LIQUI LOC PLUS 2000CC

## (undated) DEVICE — CLTH CLENS READYCLEANSE PERI CARE PK/5

## (undated) DEVICE — PK SET UP ANES OPN HEART 30

## (undated) DEVICE — PINNACLE INTRODUCER SHEATH: Brand: PINNACLE

## (undated) DEVICE — VASOVIEW HEMOPRO: Brand: VASOVIEW HEMOPRO

## (undated) DEVICE — MODEL AT P65, P/N 701554-001KIT CONTENTS: HAND CONTROLLER, 3-WAY HIGH-PRESSURE STOPCOCK WITH ROTATING END AND PREMIUM HIGH-PRESSURE TUBING: Brand: ANGIOTOUCH® KIT

## (undated) DEVICE — PK CATH CARD 30 CA/4

## (undated) DEVICE — GW STARTER FXD CORE J .035 3X150CM 3MM

## (undated) DEVICE — MYNXGRIP 6F/7F: Brand: MYNXGRIP

## (undated) DEVICE — 1/2 FORCE SURGICAL SPRING CLIP: Brand: STEALTH® SPRING CLIP

## (undated) DEVICE — SYS DISTNTION VEIN BONCHEK 300MMHG

## (undated) DEVICE — GLV SURG BIOGEL LTX PF 7 1/2

## (undated) DEVICE — GLV SURG TRIUMPH MICRO PF LTX 8.5 STRL

## (undated) DEVICE — AORTIC PUNCHES ARE USED TO CREATE A UNIFORM OPENING IN BLOOD VESSELS DURING CARDIOVASCULAR SURGERY. THE VESSEL GRAFT IS INSERTED INTO THE CREATED OPENING AND SUTURED TO THE VESSEL WALL. AORTIC LANCETS ARE USED TO MAKE A SMALL UNIFORM CUT IN A BLOOD VESSEL TO FACILITATE INSERTION OF AN AORTIC PUNCH.  PUNCHES COME IN VARIOUS LENGTHS, DIAMETERS AND TIP CONFIGURATIONS.: Brand: CLEANCUT ROTATING AORTIC PUNCH

## (undated) DEVICE — ELECTRD PAD DEFIB A/

## (undated) DEVICE — DEFOGGER!" ANTI FOG KIT: Brand: DEROYAL

## (undated) DEVICE — GLV SURG BIOGEL LTX PF 6 1/2

## (undated) DEVICE — PK OPN HEART 30

## (undated) DEVICE — GAUZE,SPONGE,4"X4",16PLY,XRAY,STRL,LF: Brand: MEDLINE

## (undated) DEVICE — DRAIN,WOUND,ROUND,24FR,5/16",FULL-FLUTED: Brand: MEDLINE

## (undated) DEVICE — E-PACK PROCEDURE KIT: Brand: E-PACK

## (undated) DEVICE — MODEL BT2000 P/N 700287-012KIT CONTENTS: MANIFOLD WITH SALINE AND CONTRAST PORTS, SALINE TUBING WITH SPIKE AND HAND SYRINGE, TRANSDUCER: Brand: BT2000 AUTOMATED MANIFOLD KIT